# Patient Record
Sex: FEMALE | Race: WHITE | NOT HISPANIC OR LATINO | Employment: OTHER | URBAN - METROPOLITAN AREA
[De-identification: names, ages, dates, MRNs, and addresses within clinical notes are randomized per-mention and may not be internally consistent; named-entity substitution may affect disease eponyms.]

---

## 2017-01-10 ENCOUNTER — TRANSCRIBE ORDERS (OUTPATIENT)
Dept: LAB | Facility: HOSPITAL | Age: 82
End: 2017-01-10

## 2017-01-10 ENCOUNTER — APPOINTMENT (OUTPATIENT)
Dept: LAB | Facility: HOSPITAL | Age: 82
End: 2017-01-10
Attending: INTERNAL MEDICINE
Payer: MEDICARE

## 2017-01-10 ENCOUNTER — GENERIC CONVERSION - ENCOUNTER (OUTPATIENT)
Dept: OTHER | Facility: OTHER | Age: 82
End: 2017-01-10

## 2017-01-10 DIAGNOSIS — I48.91 ATRIAL FIBRILLATION (HCC): ICD-10-CM

## 2017-01-10 LAB
INR PPP: 3.17 (ref 0.86–1.16)
PROTHROMBIN TIME: 31.9 SECONDS (ref 12–14.3)

## 2017-01-10 PROCEDURE — 85610 PROTHROMBIN TIME: CPT

## 2017-01-10 PROCEDURE — 36415 COLL VENOUS BLD VENIPUNCTURE: CPT

## 2017-01-20 ENCOUNTER — APPOINTMENT (OUTPATIENT)
Dept: AUDIOLOGY | Age: 82
End: 2017-01-20
Payer: MEDICARE

## 2017-01-20 PROCEDURE — V5014 HEARING AID REPAIR/MODIFYING: HCPCS | Performed by: AUDIOLOGIST

## 2017-01-25 ENCOUNTER — APPOINTMENT (OUTPATIENT)
Dept: LAB | Facility: HOSPITAL | Age: 82
End: 2017-01-25
Attending: INTERNAL MEDICINE
Payer: MEDICARE

## 2017-01-25 ENCOUNTER — TRANSCRIBE ORDERS (OUTPATIENT)
Dept: LAB | Facility: HOSPITAL | Age: 82
End: 2017-01-25

## 2017-01-25 ENCOUNTER — APPOINTMENT (OUTPATIENT)
Dept: AUDIOLOGY | Age: 82
End: 2017-01-25
Payer: MEDICARE

## 2017-01-25 DIAGNOSIS — Z79.01 LONG TERM (CURRENT) USE OF ANTICOAGULANTS: Primary | ICD-10-CM

## 2017-01-25 DIAGNOSIS — I48.91 ATRIAL FIBRILLATION (HCC): ICD-10-CM

## 2017-01-25 LAB
INR PPP: 1.34 (ref 0.86–1.16)
PROTHROMBIN TIME: 16.6 SECONDS (ref 12–14.3)

## 2017-01-25 PROCEDURE — 85610 PROTHROMBIN TIME: CPT

## 2017-01-25 PROCEDURE — 36415 COLL VENOUS BLD VENIPUNCTURE: CPT

## 2017-01-31 ENCOUNTER — GENERIC CONVERSION - ENCOUNTER (OUTPATIENT)
Dept: CARDIOLOGY CLINIC | Facility: CLINIC | Age: 82
End: 2017-01-31

## 2017-02-02 ENCOUNTER — ALLSCRIPTS OFFICE VISIT (OUTPATIENT)
Dept: OTHER | Facility: OTHER | Age: 82
End: 2017-02-02

## 2017-02-07 ENCOUNTER — APPOINTMENT (OUTPATIENT)
Dept: LAB | Facility: HOSPITAL | Age: 82
End: 2017-02-07
Attending: INTERNAL MEDICINE
Payer: MEDICARE

## 2017-02-07 ENCOUNTER — GENERIC CONVERSION - ENCOUNTER (OUTPATIENT)
Dept: OTHER | Facility: OTHER | Age: 82
End: 2017-02-07

## 2017-02-07 DIAGNOSIS — I48.91 ATRIAL FIBRILLATION (HCC): ICD-10-CM

## 2017-02-07 LAB
INR PPP: 2.12 (ref 0.86–1.16)
PROTHROMBIN TIME: 23.5 SECONDS (ref 12–14.3)

## 2017-02-07 PROCEDURE — 36415 COLL VENOUS BLD VENIPUNCTURE: CPT

## 2017-02-07 PROCEDURE — 85610 PROTHROMBIN TIME: CPT

## 2017-02-08 DIAGNOSIS — I48.91 ATRIAL FIBRILLATION (HCC): ICD-10-CM

## 2017-02-08 DIAGNOSIS — H91.90 HEARING LOSS: ICD-10-CM

## 2017-02-20 ENCOUNTER — APPOINTMENT (OUTPATIENT)
Dept: AUDIOLOGY | Age: 82
End: 2017-02-20
Payer: MEDICARE

## 2017-02-20 PROCEDURE — 92557 COMPREHENSIVE HEARING TEST: CPT | Performed by: AUDIOLOGIST

## 2017-02-20 PROCEDURE — 92567 TYMPANOMETRY: CPT | Performed by: AUDIOLOGIST

## 2017-02-23 ENCOUNTER — GENERIC CONVERSION - ENCOUNTER (OUTPATIENT)
Dept: OTHER | Facility: OTHER | Age: 82
End: 2017-02-23

## 2017-02-24 ENCOUNTER — TRANSCRIBE ORDERS (OUTPATIENT)
Dept: LAB | Facility: HOSPITAL | Age: 82
End: 2017-02-24

## 2017-02-24 ENCOUNTER — GENERIC CONVERSION - ENCOUNTER (OUTPATIENT)
Dept: OTHER | Facility: OTHER | Age: 82
End: 2017-02-24

## 2017-02-24 ENCOUNTER — APPOINTMENT (OUTPATIENT)
Dept: LAB | Facility: HOSPITAL | Age: 82
End: 2017-02-24
Attending: INTERNAL MEDICINE
Payer: MEDICARE

## 2017-02-24 DIAGNOSIS — I48.91 ATRIAL FIBRILLATION (HCC): ICD-10-CM

## 2017-02-24 LAB
INR PPP: 2.14 (ref 0.86–1.16)
PROTHROMBIN TIME: 23.7 SECONDS (ref 12–14.3)

## 2017-02-24 PROCEDURE — 36415 COLL VENOUS BLD VENIPUNCTURE: CPT

## 2017-02-24 PROCEDURE — 85610 PROTHROMBIN TIME: CPT

## 2017-03-09 ENCOUNTER — GENERIC CONVERSION - ENCOUNTER (OUTPATIENT)
Dept: OTHER | Facility: OTHER | Age: 82
End: 2017-03-09

## 2017-03-15 DIAGNOSIS — Z12.31 ENCOUNTER FOR SCREENING MAMMOGRAM FOR MALIGNANT NEOPLASM OF BREAST: ICD-10-CM

## 2017-03-15 DIAGNOSIS — I48.91 ATRIAL FIBRILLATION (HCC): ICD-10-CM

## 2017-03-24 ENCOUNTER — GENERIC CONVERSION - ENCOUNTER (OUTPATIENT)
Dept: OTHER | Facility: OTHER | Age: 82
End: 2017-03-24

## 2017-03-24 ENCOUNTER — APPOINTMENT (OUTPATIENT)
Dept: LAB | Facility: HOSPITAL | Age: 82
End: 2017-03-24
Attending: INTERNAL MEDICINE
Payer: MEDICARE

## 2017-03-24 ENCOUNTER — TRANSCRIBE ORDERS (OUTPATIENT)
Dept: LAB | Facility: HOSPITAL | Age: 82
End: 2017-03-24

## 2017-03-24 DIAGNOSIS — I48.91 ATRIAL FIBRILLATION (HCC): ICD-10-CM

## 2017-03-24 LAB
INR PPP: 3.15 (ref 0.86–1.16)
PROTHROMBIN TIME: 31.7 SECONDS (ref 12–14.3)

## 2017-03-24 PROCEDURE — 85610 PROTHROMBIN TIME: CPT

## 2017-03-24 PROCEDURE — 36415 COLL VENOUS BLD VENIPUNCTURE: CPT

## 2017-04-07 ENCOUNTER — GENERIC CONVERSION - ENCOUNTER (OUTPATIENT)
Dept: OTHER | Facility: OTHER | Age: 82
End: 2017-04-07

## 2017-04-07 ENCOUNTER — TRANSCRIBE ORDERS (OUTPATIENT)
Dept: LAB | Facility: HOSPITAL | Age: 82
End: 2017-04-07

## 2017-04-07 ENCOUNTER — APPOINTMENT (OUTPATIENT)
Dept: LAB | Facility: HOSPITAL | Age: 82
End: 2017-04-07
Attending: INTERNAL MEDICINE
Payer: MEDICARE

## 2017-04-07 DIAGNOSIS — I48.91 ATRIAL FIBRILLATION (HCC): ICD-10-CM

## 2017-04-07 LAB
INR PPP: 3.33 (ref 0.86–1.16)
PROTHROMBIN TIME: 33.1 SECONDS (ref 12–14.3)

## 2017-04-07 PROCEDURE — 36415 COLL VENOUS BLD VENIPUNCTURE: CPT

## 2017-04-07 PROCEDURE — 85610 PROTHROMBIN TIME: CPT

## 2017-04-14 ENCOUNTER — GENERIC CONVERSION - ENCOUNTER (OUTPATIENT)
Dept: OTHER | Facility: OTHER | Age: 82
End: 2017-04-14

## 2017-04-19 ENCOUNTER — GENERIC CONVERSION - ENCOUNTER (OUTPATIENT)
Dept: OTHER | Facility: OTHER | Age: 82
End: 2017-04-19

## 2017-04-19 ENCOUNTER — LAB (OUTPATIENT)
Dept: LAB | Facility: HOSPITAL | Age: 82
End: 2017-04-19
Payer: MEDICARE

## 2017-04-19 ENCOUNTER — TRANSCRIBE ORDERS (OUTPATIENT)
Dept: LAB | Facility: HOSPITAL | Age: 82
End: 2017-04-19

## 2017-04-19 DIAGNOSIS — I48.91 ATRIAL FIBRILLATION (HCC): ICD-10-CM

## 2017-04-19 DIAGNOSIS — I10 ESSENTIAL (PRIMARY) HYPERTENSION: ICD-10-CM

## 2017-04-19 DIAGNOSIS — M81.0 SENILE OSTEOPOROSIS: ICD-10-CM

## 2017-04-19 DIAGNOSIS — M81.0 SENILE OSTEOPOROSIS: Primary | ICD-10-CM

## 2017-04-19 LAB
25(OH)D3 SERPL-MCNC: 10.8 NG/ML (ref 30–100)
ALBUMIN SERPL BCP-MCNC: 3.1 G/DL (ref 3.5–5)
ALP SERPL-CCNC: 100 U/L (ref 46–116)
ALT SERPL W P-5'-P-CCNC: 15 U/L (ref 12–78)
ANION GAP SERPL CALCULATED.3IONS-SCNC: 8 MMOL/L (ref 4–13)
AST SERPL W P-5'-P-CCNC: 10 U/L (ref 5–45)
BILIRUB SERPL-MCNC: 1.15 MG/DL (ref 0.2–1)
BUN SERPL-MCNC: 18 MG/DL (ref 5–25)
CALCIUM SERPL-MCNC: 9.3 MG/DL (ref 8.3–10.1)
CHLORIDE SERPL-SCNC: 102 MMOL/L (ref 100–108)
CO2 SERPL-SCNC: 29 MMOL/L (ref 21–32)
CREAT SERPL-MCNC: 0.58 MG/DL (ref 0.6–1.3)
GFR SERPL CREATININE-BSD FRML MDRD: >60 ML/MIN/1.73SQ M
GLUCOSE SERPL-MCNC: 92 MG/DL (ref 65–140)
INR PPP: 1.89 (ref 0.86–1.16)
POTASSIUM SERPL-SCNC: 3.9 MMOL/L (ref 3.5–5.3)
PROT SERPL-MCNC: 6.9 G/DL (ref 6.4–8.2)
PROTHROMBIN TIME: 21.5 SECONDS (ref 12–14.3)
PTH-INTACT SERPL-MCNC: 75.5 PG/ML (ref 14–72)
SODIUM SERPL-SCNC: 139 MMOL/L (ref 136–145)
TSH SERPL DL<=0.05 MIU/L-ACNC: 1.6 UIU/ML (ref 0.36–3.74)

## 2017-04-19 PROCEDURE — 80053 COMPREHEN METABOLIC PANEL: CPT

## 2017-04-19 PROCEDURE — 36415 COLL VENOUS BLD VENIPUNCTURE: CPT

## 2017-04-19 PROCEDURE — 85610 PROTHROMBIN TIME: CPT

## 2017-04-19 PROCEDURE — 84080 ASSAY ALKALINE PHOSPHATASES: CPT

## 2017-04-19 PROCEDURE — 82784 ASSAY IGA/IGD/IGG/IGM EACH: CPT

## 2017-04-19 PROCEDURE — 83516 IMMUNOASSAY NONANTIBODY: CPT

## 2017-04-19 PROCEDURE — 84165 PROTEIN E-PHORESIS SERUM: CPT

## 2017-04-19 PROCEDURE — 83970 ASSAY OF PARATHORMONE: CPT

## 2017-04-19 PROCEDURE — 84443 ASSAY THYROID STIM HORMONE: CPT

## 2017-04-19 PROCEDURE — 82306 VITAMIN D 25 HYDROXY: CPT

## 2017-04-19 PROCEDURE — 82523 COLLAGEN CROSSLINKS: CPT

## 2017-04-19 PROCEDURE — 86334 IMMUNOFIX E-PHORESIS SERUM: CPT

## 2017-04-21 ENCOUNTER — APPOINTMENT (OUTPATIENT)
Dept: LAB | Facility: HOSPITAL | Age: 82
End: 2017-04-21
Payer: MEDICARE

## 2017-04-21 LAB
ALBUMIN SERPL ELPH-MCNC: 3.61 G/DL (ref 3.5–5)
ALBUMIN SERPL ELPH-MCNC: 55.5 % (ref 52–65)
ALPHA1 GLOB SERPL ELPH-MCNC: 0.34 G/DL (ref 0.1–0.4)
ALPHA1 GLOB SERPL ELPH-MCNC: 5.3 % (ref 2.5–5)
ALPHA2 GLOB SERPL ELPH-MCNC: 0.74 G/DL (ref 0.4–1.2)
ALPHA2 GLOB SERPL ELPH-MCNC: 11.4 % (ref 7–13)
BETA GLOB ABNORMAL SERPL ELPH-MCNC: 0.47 G/DL (ref 0.4–0.8)
BETA1 GLOB SERPL ELPH-MCNC: 7.2 % (ref 5–13)
BETA2 GLOB SERPL ELPH-MCNC: 5.5 % (ref 2–8)
BETA2+GAMMA GLOB SERPL ELPH-MCNC: 0.36 G/DL (ref 0.2–0.5)
CALCIUM 24H UR-MCNC: <82.5 MG/24 HRS (ref 42–353)
CREAT 24H UR-MRATE: 0.8 G/24HR (ref 0.6–1.8)
GAMMA GLOB ABNORMAL SERPL ELPH-MCNC: 0.98 G/DL (ref 0.5–1.6)
GAMMA GLOB SERPL ELPH-MCNC: 15.1 % (ref 12–22)
IGG/ALB SER: 1.25 {RATIO} (ref 1.1–1.8)
INTERPRETATION UR IFE-IMP: NORMAL
M PROTEIN 1 MFR SERPL ELPH: 5.2 %
M PROTEIN 1 SERPL ELPH-MCNC: 0.34 G/DL
PROT SERPL-MCNC: 6.5 G/DL (ref 6.4–8.2)
SPECIMEN VOL UR: 1650 ML
SPECIMEN VOL UR: 1650 ML

## 2017-04-21 PROCEDURE — 82340 ASSAY OF CALCIUM IN URINE: CPT | Performed by: DENTIST

## 2017-04-21 PROCEDURE — 82570 ASSAY OF URINE CREATININE: CPT | Performed by: DENTIST

## 2017-04-23 LAB — COLLAGEN CTX SERPL-MCNC: 1207 PG/ML

## 2017-04-24 LAB — ALP BONE SERPL-MCNC: 21.2 UG/L

## 2017-04-25 LAB — MISCELLANEOUS LAB TEST RESULT: NORMAL

## 2017-04-28 ENCOUNTER — GENERIC CONVERSION - ENCOUNTER (OUTPATIENT)
Dept: OTHER | Facility: OTHER | Age: 82
End: 2017-04-28

## 2017-05-08 ENCOUNTER — ALLSCRIPTS OFFICE VISIT (OUTPATIENT)
Dept: OTHER | Facility: OTHER | Age: 82
End: 2017-05-08

## 2017-05-09 ENCOUNTER — TRANSCRIBE ORDERS (OUTPATIENT)
Dept: LAB | Facility: HOSPITAL | Age: 82
End: 2017-05-09

## 2017-05-10 ENCOUNTER — APPOINTMENT (OUTPATIENT)
Dept: LAB | Facility: HOSPITAL | Age: 82
End: 2017-05-10
Attending: INTERNAL MEDICINE
Payer: MEDICARE

## 2017-05-10 ENCOUNTER — GENERIC CONVERSION - ENCOUNTER (OUTPATIENT)
Dept: OTHER | Facility: OTHER | Age: 82
End: 2017-05-10

## 2017-05-10 DIAGNOSIS — Z79.01 LONG TERM (CURRENT) USE OF ANTICOAGULANTS: ICD-10-CM

## 2017-05-10 DIAGNOSIS — I48.91 ATRIAL FIBRILLATION, UNSPECIFIED TYPE (HCC): ICD-10-CM

## 2017-05-10 DIAGNOSIS — I48.91 ATRIAL FIBRILLATION (HCC): ICD-10-CM

## 2017-05-10 LAB
INR PPP: 2.64 (ref 0.86–1.16)
PROTHROMBIN TIME: 28.5 SECONDS (ref 12.1–14.4)

## 2017-05-10 PROCEDURE — 36415 COLL VENOUS BLD VENIPUNCTURE: CPT

## 2017-05-10 PROCEDURE — 85610 PROTHROMBIN TIME: CPT

## 2017-05-19 ENCOUNTER — APPOINTMENT (OUTPATIENT)
Dept: LAB | Facility: HOSPITAL | Age: 82
End: 2017-05-19
Attending: INTERNAL MEDICINE
Payer: MEDICARE

## 2017-05-19 ENCOUNTER — ALLSCRIPTS OFFICE VISIT (OUTPATIENT)
Dept: OTHER | Facility: OTHER | Age: 82
End: 2017-05-19

## 2017-05-19 ENCOUNTER — GENERIC CONVERSION - ENCOUNTER (OUTPATIENT)
Dept: OTHER | Facility: OTHER | Age: 82
End: 2017-05-19

## 2017-05-19 ENCOUNTER — TRANSCRIBE ORDERS (OUTPATIENT)
Dept: LAB | Facility: HOSPITAL | Age: 82
End: 2017-05-19

## 2017-05-19 DIAGNOSIS — I48.91 ATRIAL FIBRILLATION (HCC): ICD-10-CM

## 2017-05-19 LAB
INR PPP: 1.7 (ref 0.86–1.16)
PROTHROMBIN TIME: 20.1 SECONDS (ref 12.1–14.4)

## 2017-05-19 PROCEDURE — 36415 COLL VENOUS BLD VENIPUNCTURE: CPT

## 2017-05-19 PROCEDURE — 85610 PROTHROMBIN TIME: CPT

## 2017-05-26 DIAGNOSIS — I48.91 ATRIAL FIBRILLATION (HCC): ICD-10-CM

## 2017-06-04 ENCOUNTER — GENERIC CONVERSION - ENCOUNTER (OUTPATIENT)
Dept: OTHER | Facility: OTHER | Age: 82
End: 2017-06-04

## 2017-06-05 ENCOUNTER — APPOINTMENT (OUTPATIENT)
Dept: LAB | Facility: HOSPITAL | Age: 82
End: 2017-06-05
Attending: INTERNAL MEDICINE
Payer: MEDICARE

## 2017-06-05 ENCOUNTER — TRANSCRIBE ORDERS (OUTPATIENT)
Dept: LAB | Facility: HOSPITAL | Age: 82
End: 2017-06-05

## 2017-06-05 DIAGNOSIS — I48.91 ATRIAL FIBRILLATION (HCC): ICD-10-CM

## 2017-06-05 LAB
INR PPP: 2.53 (ref 0.86–1.16)
PROTHROMBIN TIME: 27.6 SECONDS (ref 12.1–14.4)

## 2017-06-05 PROCEDURE — 36415 COLL VENOUS BLD VENIPUNCTURE: CPT

## 2017-06-05 PROCEDURE — 85610 PROTHROMBIN TIME: CPT

## 2017-06-11 ENCOUNTER — GENERIC CONVERSION - ENCOUNTER (OUTPATIENT)
Dept: OTHER | Facility: OTHER | Age: 82
End: 2017-06-11

## 2017-06-16 ENCOUNTER — GENERIC CONVERSION - ENCOUNTER (OUTPATIENT)
Dept: OTHER | Facility: OTHER | Age: 82
End: 2017-06-16

## 2017-06-21 ENCOUNTER — APPOINTMENT (OUTPATIENT)
Dept: LAB | Facility: HOSPITAL | Age: 82
End: 2017-06-21
Attending: INTERNAL MEDICINE
Payer: MEDICARE

## 2017-06-21 ENCOUNTER — TRANSCRIBE ORDERS (OUTPATIENT)
Dept: LAB | Facility: HOSPITAL | Age: 82
End: 2017-06-21

## 2017-06-21 ENCOUNTER — GENERIC CONVERSION - ENCOUNTER (OUTPATIENT)
Dept: OTHER | Facility: OTHER | Age: 82
End: 2017-06-21

## 2017-06-21 DIAGNOSIS — I48.91 ATRIAL FIBRILLATION (HCC): ICD-10-CM

## 2017-06-21 LAB
INR PPP: 3.09 (ref 0.86–1.16)
PROTHROMBIN TIME: 32.3 SECONDS (ref 12.1–14.4)

## 2017-06-21 PROCEDURE — 85610 PROTHROMBIN TIME: CPT

## 2017-06-22 ENCOUNTER — GENERIC CONVERSION - ENCOUNTER (OUTPATIENT)
Dept: OTHER | Facility: OTHER | Age: 82
End: 2017-06-22

## 2017-06-22 ENCOUNTER — HOSPITAL ENCOUNTER (EMERGENCY)
Facility: HOSPITAL | Age: 82
Discharge: HOME | End: 2017-06-22
Attending: EMERGENCY MEDICINE | Admitting: EMERGENCY MEDICINE
Payer: MEDICARE

## 2017-06-22 ENCOUNTER — APPOINTMENT (EMERGENCY)
Dept: NON INVASIVE DIAGNOSTICS | Facility: HOSPITAL | Age: 82
End: 2017-06-22
Payer: MEDICARE

## 2017-06-22 VITALS
WEIGHT: 185 LBS | SYSTOLIC BLOOD PRESSURE: 182 MMHG | TEMPERATURE: 98.2 F | HEART RATE: 80 BPM | OXYGEN SATURATION: 96 % | RESPIRATION RATE: 18 BRPM | DIASTOLIC BLOOD PRESSURE: 88 MMHG

## 2017-06-22 DIAGNOSIS — R60.0 EDEMA OF BOTH LEGS: ICD-10-CM

## 2017-06-22 DIAGNOSIS — R60.0 BILATERAL LOWER EXTREMITY EDEMA: Primary | ICD-10-CM

## 2017-06-22 LAB
ANION GAP SERPL CALCULATED.3IONS-SCNC: 6 MMOL/L (ref 4–13)
BASOPHILS # BLD AUTO: 0.04 THOUSANDS/ΜL (ref 0–0.1)
BASOPHILS NFR BLD AUTO: 0 % (ref 0–1)
BUN SERPL-MCNC: 16 MG/DL (ref 5–25)
CALCIUM SERPL-MCNC: 8.2 MG/DL (ref 8.3–10.1)
CHLORIDE SERPL-SCNC: 104 MMOL/L (ref 100–108)
CO2 SERPL-SCNC: 28 MMOL/L (ref 21–32)
CREAT SERPL-MCNC: 0.88 MG/DL (ref 0.6–1.3)
EOSINOPHIL # BLD AUTO: 0.14 THOUSAND/ΜL (ref 0–0.61)
EOSINOPHIL NFR BLD AUTO: 2 % (ref 0–6)
ERYTHROCYTE [DISTWIDTH] IN BLOOD BY AUTOMATED COUNT: 13.3 % (ref 11.6–15.1)
GFR SERPL CREATININE-BSD FRML MDRD: >60 ML/MIN/1.73SQ M
GLUCOSE SERPL-MCNC: 101 MG/DL (ref 65–140)
HCT VFR BLD AUTO: 37.8 % (ref 34.8–46.1)
HGB BLD-MCNC: 12.5 G/DL (ref 11.5–15.4)
LYMPHOCYTES # BLD AUTO: 1.64 THOUSANDS/ΜL (ref 0.6–4.47)
LYMPHOCYTES NFR BLD AUTO: 18 % (ref 14–44)
MCH RBC QN AUTO: 32 PG (ref 26.8–34.3)
MCHC RBC AUTO-ENTMCNC: 33.1 G/DL (ref 31.4–37.4)
MCV RBC AUTO: 97 FL (ref 82–98)
MONOCYTES # BLD AUTO: 1.12 THOUSAND/ΜL (ref 0.17–1.22)
MONOCYTES NFR BLD AUTO: 12 % (ref 4–12)
NEUTROPHILS # BLD AUTO: 6.2 THOUSANDS/ΜL (ref 1.85–7.62)
NEUTS SEG NFR BLD AUTO: 68 % (ref 43–75)
NRBC BLD AUTO-RTO: 0 /100 WBCS
NT-PROBNP SERPL-MCNC: 957 PG/ML
PLATELET # BLD AUTO: 332 THOUSANDS/UL (ref 149–390)
PMV BLD AUTO: 9.6 FL (ref 8.9–12.7)
POTASSIUM SERPL-SCNC: 3.7 MMOL/L (ref 3.5–5.3)
RBC # BLD AUTO: 3.91 MILLION/UL (ref 3.81–5.12)
SODIUM SERPL-SCNC: 138 MMOL/L (ref 136–145)
WBC # BLD AUTO: 9.15 THOUSAND/UL (ref 4.31–10.16)

## 2017-06-22 PROCEDURE — 80048 BASIC METABOLIC PNL TOTAL CA: CPT | Performed by: EMERGENCY MEDICINE

## 2017-06-22 PROCEDURE — 99284 EMERGENCY DEPT VISIT MOD MDM: CPT

## 2017-06-22 PROCEDURE — 85025 COMPLETE CBC W/AUTO DIFF WBC: CPT | Performed by: EMERGENCY MEDICINE

## 2017-06-22 PROCEDURE — 93970 EXTREMITY STUDY: CPT

## 2017-06-22 PROCEDURE — 36415 COLL VENOUS BLD VENIPUNCTURE: CPT | Performed by: EMERGENCY MEDICINE

## 2017-06-22 PROCEDURE — 83880 ASSAY OF NATRIURETIC PEPTIDE: CPT | Performed by: EMERGENCY MEDICINE

## 2017-06-22 RX ORDER — ACETAMINOPHEN 325 MG/1
325 TABLET ORAL EVERY 6 HOURS PRN
COMMUNITY

## 2017-06-22 RX ORDER — WARFARIN SODIUM 2.5 MG/1
5 TABLET ORAL
COMMUNITY
End: 2019-09-10 | Stop reason: SDUPTHER

## 2017-06-22 RX ORDER — MELATONIN
50000 DAILY
COMMUNITY
End: 2018-08-29 | Stop reason: SDUPTHER

## 2017-06-22 RX ORDER — CLINDAMYCIN HYDROCHLORIDE 300 MG/1
300 CAPSULE ORAL 4 TIMES DAILY
COMMUNITY
End: 2018-05-21 | Stop reason: ALTCHOICE

## 2017-06-22 RX ORDER — FUROSEMIDE 40 MG/1
40 TABLET ORAL 2 TIMES DAILY
COMMUNITY
End: 2018-08-29 | Stop reason: SDUPTHER

## 2017-06-22 RX ORDER — MULTIVITAMIN
1 TABLET ORAL DAILY
COMMUNITY
End: 2018-08-29 | Stop reason: SDUPTHER

## 2017-06-22 RX ORDER — LOSARTAN POTASSIUM 50 MG/1
50 TABLET ORAL DAILY
COMMUNITY
End: 2019-03-07 | Stop reason: SDUPTHER

## 2017-06-22 RX ORDER — CLONIDINE HYDROCHLORIDE 0.1 MG/1
0.1 TABLET ORAL 2 TIMES DAILY
COMMUNITY
End: 2018-02-26 | Stop reason: SDUPTHER

## 2017-06-22 RX ORDER — ALBUTEROL SULFATE 90 UG/1
2 AEROSOL, METERED RESPIRATORY (INHALATION) EVERY 4 HOURS PRN
COMMUNITY
End: 2018-08-29 | Stop reason: SDUPTHER

## 2017-06-22 RX ORDER — WARFARIN SODIUM 2.5 MG/1
2.5 TABLET ORAL
COMMUNITY
End: 2018-08-21 | Stop reason: SDUPTHER

## 2017-06-22 RX ORDER — FLUTICASONE PROPIONATE 50 MCG
2 SPRAY, SUSPENSION (ML) NASAL DAILY
COMMUNITY
End: 2018-08-29 | Stop reason: SDUPTHER

## 2017-06-22 RX ORDER — IBUPROFEN 200 MG
1 CAPSULE ORAL DAILY
COMMUNITY
End: 2018-08-29 | Stop reason: SDUPTHER

## 2017-06-22 RX ORDER — TIMOLOL MALEATE 5 MG/ML
1 SOLUTION OPHTHALMIC DAILY
COMMUNITY
End: 2018-08-29 | Stop reason: SDUPTHER

## 2017-06-22 RX ORDER — ESOMEPRAZOLE MAGNESIUM 40 MG/1
40 FOR SUSPENSION ORAL
COMMUNITY
End: 2018-08-29 | Stop reason: SDUPTHER

## 2017-06-22 RX ORDER — DILTIAZEM HYDROCHLORIDE 240 MG/1
240 CAPSULE, EXTENDED RELEASE ORAL DAILY
COMMUNITY
End: 2018-08-29 | Stop reason: SDUPTHER

## 2017-06-26 ENCOUNTER — ALLSCRIPTS OFFICE VISIT (OUTPATIENT)
Dept: OTHER | Facility: OTHER | Age: 82
End: 2017-06-26

## 2017-06-26 DIAGNOSIS — I48.91 ATRIAL FIBRILLATION (HCC): ICD-10-CM

## 2017-06-26 DIAGNOSIS — I10 ESSENTIAL (PRIMARY) HYPERTENSION: ICD-10-CM

## 2017-06-30 DIAGNOSIS — I48.91 ATRIAL FIBRILLATION (HCC): ICD-10-CM

## 2017-06-30 DIAGNOSIS — R39.9 UNSPECIFIED SYMPTOMS AND SIGNS INVOLVING THE GENITOURINARY SYSTEM: ICD-10-CM

## 2017-06-30 DIAGNOSIS — R35.0 FREQUENCY OF MICTURITION: ICD-10-CM

## 2017-07-10 ENCOUNTER — APPOINTMENT (OUTPATIENT)
Dept: LAB | Facility: HOSPITAL | Age: 82
End: 2017-07-10
Attending: INTERNAL MEDICINE
Payer: MEDICARE

## 2017-07-10 ENCOUNTER — HOSPITAL ENCOUNTER (OUTPATIENT)
Dept: RADIOLOGY | Facility: HOSPITAL | Age: 82
Discharge: HOME | End: 2017-07-10
Payer: MEDICARE

## 2017-07-10 DIAGNOSIS — Z12.31 ENCOUNTER FOR SCREENING MAMMOGRAM FOR MALIGNANT NEOPLASM OF BREAST: ICD-10-CM

## 2017-07-10 DIAGNOSIS — I48.91 ATRIAL FIBRILLATION (HCC): ICD-10-CM

## 2017-07-10 LAB
INR PPP: 2.81 (ref 0.86–1.16)
PROTHROMBIN TIME: 30 SECONDS (ref 12.1–14.4)

## 2017-07-10 PROCEDURE — 85610 PROTHROMBIN TIME: CPT

## 2017-07-10 PROCEDURE — G0202 SCR MAMMO BI INCL CAD: HCPCS

## 2017-07-10 PROCEDURE — 36415 COLL VENOUS BLD VENIPUNCTURE: CPT

## 2017-07-11 ENCOUNTER — TRANSCRIBE ORDERS (OUTPATIENT)
Dept: ADMINISTRATIVE | Facility: HOSPITAL | Age: 82
End: 2017-07-11

## 2017-07-11 DIAGNOSIS — Z12.31 ENCOUNTER FOR MAMMOGRAM TO ESTABLISH BASELINE MAMMOGRAM: Primary | ICD-10-CM

## 2017-07-14 ENCOUNTER — HOSPITAL ENCOUNTER (OUTPATIENT)
Dept: NON INVASIVE DIAGNOSTICS | Facility: CLINIC | Age: 82
Discharge: HOME | End: 2017-07-14
Payer: MEDICARE

## 2017-07-14 DIAGNOSIS — I48.91 ATRIAL FIBRILLATION (HCC): ICD-10-CM

## 2017-07-14 DIAGNOSIS — I10 ESSENTIAL (PRIMARY) HYPERTENSION: ICD-10-CM

## 2017-07-14 PROCEDURE — 93923 UPR/LXTR ART STDY 3+ LVLS: CPT

## 2017-07-15 ENCOUNTER — GENERIC CONVERSION - ENCOUNTER (OUTPATIENT)
Dept: OTHER | Facility: OTHER | Age: 82
End: 2017-07-15

## 2017-07-17 ENCOUNTER — ALLSCRIPTS OFFICE VISIT (OUTPATIENT)
Dept: OTHER | Facility: OTHER | Age: 82
End: 2017-07-17

## 2017-07-17 ENCOUNTER — APPOINTMENT (OUTPATIENT)
Dept: LAB | Facility: HOSPITAL | Age: 82
End: 2017-07-17
Payer: MEDICARE

## 2017-07-17 DIAGNOSIS — R39.9 UNSPECIFIED SYMPTOMS AND SIGNS INVOLVING THE GENITOURINARY SYSTEM: ICD-10-CM

## 2017-07-17 DIAGNOSIS — R35.0 FREQUENCY OF MICTURITION: ICD-10-CM

## 2017-07-17 LAB
BACTERIA UR QL AUTO: ABNORMAL /HPF
BILIRUB UR QL STRIP: NEGATIVE
BILIRUB UR QL STRIP: NORMAL
CLARITY UR: CLEAR
CLARITY UR: NORMAL
COLOR UR: YELLOW
COLOR UR: YELLOW
GLUCOSE (HISTORICAL): NORMAL
GLUCOSE UR STRIP-MCNC: NEGATIVE MG/DL
HGB UR QL STRIP.AUTO: 250
HGB UR QL STRIP.AUTO: ABNORMAL
HYALINE CASTS #/AREA URNS LPF: ABNORMAL /LPF
KETONES UR STRIP-MCNC: NEGATIVE MG/DL
KETONES UR STRIP-MCNC: NEGATIVE MG/DL
LEUKOCYTE ESTERASE UR QL STRIP: NEGATIVE
LEUKOCYTE ESTERASE UR QL STRIP: NORMAL
NITRITE UR QL STRIP: NEGATIVE
NITRITE UR QL STRIP: NEGATIVE
NON-SQ EPI CELLS URNS QL MICRO: ABNORMAL /HPF
PH UR STRIP.AUTO: 7.5 [PH] (ref 4.5–8)
PH UR STRIP.AUTO: 8 [PH]
PROT UR STRIP-MCNC: ABNORMAL MG/DL
PROT UR STRIP-MCNC: NORMAL MG/DL
RBC #/AREA URNS AUTO: ABNORMAL /HPF
SP GR UR STRIP.AUTO: 1.02 (ref 1–1.03)
SP GR UR STRIP.AUTO: 1010
UROBILINOGEN UR QL STRIP.AUTO: 1 E.U./DL
UROBILINOGEN UR QL STRIP.AUTO: NORMAL
WBC #/AREA URNS AUTO: ABNORMAL /HPF

## 2017-07-17 PROCEDURE — 81001 URINALYSIS AUTO W/SCOPE: CPT

## 2017-07-22 DIAGNOSIS — I48.91 ATRIAL FIBRILLATION (HCC): ICD-10-CM

## 2017-07-22 DIAGNOSIS — Z12.31 ENCOUNTER FOR SCREENING MAMMOGRAM FOR MALIGNANT NEOPLASM OF BREAST: ICD-10-CM

## 2017-08-17 ENCOUNTER — ALLSCRIPTS OFFICE VISIT (OUTPATIENT)
Dept: OTHER | Facility: OTHER | Age: 82
End: 2017-08-17

## 2017-08-18 ENCOUNTER — TRANSCRIBE ORDERS (OUTPATIENT)
Dept: LAB | Facility: HOSPITAL | Age: 82
End: 2017-08-18

## 2017-08-18 ENCOUNTER — GENERIC CONVERSION - ENCOUNTER (OUTPATIENT)
Dept: OTHER | Facility: OTHER | Age: 82
End: 2017-08-18

## 2017-08-18 ENCOUNTER — APPOINTMENT (OUTPATIENT)
Dept: LAB | Facility: HOSPITAL | Age: 82
End: 2017-08-18
Attending: INTERNAL MEDICINE
Payer: MEDICARE

## 2017-08-18 DIAGNOSIS — I48.91 ATRIAL FIBRILLATION (HCC): ICD-10-CM

## 2017-08-18 DIAGNOSIS — E55.9 UNSPECIFIED VITAMIN D DEFICIENCY: Primary | ICD-10-CM

## 2017-08-18 DIAGNOSIS — E55.9 UNSPECIFIED VITAMIN D DEFICIENCY: ICD-10-CM

## 2017-08-18 LAB
25(OH)D3 SERPL-MCNC: 32 NG/ML (ref 30–100)
INR PPP: 2.44 (ref 0.86–1.16)
PROTHROMBIN TIME: 26.8 SECONDS (ref 12.1–14.4)

## 2017-08-18 PROCEDURE — 36415 COLL VENOUS BLD VENIPUNCTURE: CPT

## 2017-08-18 PROCEDURE — 85610 PROTHROMBIN TIME: CPT

## 2017-08-18 PROCEDURE — 82306 VITAMIN D 25 HYDROXY: CPT

## 2017-08-25 DIAGNOSIS — I48.91 ATRIAL FIBRILLATION (HCC): ICD-10-CM

## 2017-09-14 ENCOUNTER — TRANSCRIBE ORDERS (OUTPATIENT)
Dept: LAB | Facility: HOSPITAL | Age: 82
End: 2017-09-14

## 2017-09-15 ENCOUNTER — APPOINTMENT (OUTPATIENT)
Dept: LAB | Facility: HOSPITAL | Age: 82
End: 2017-09-15
Attending: INTERNAL MEDICINE
Payer: MEDICARE

## 2017-09-15 ENCOUNTER — GENERIC CONVERSION - ENCOUNTER (OUTPATIENT)
Dept: OTHER | Facility: OTHER | Age: 82
End: 2017-09-15

## 2017-09-15 ENCOUNTER — TRANSCRIBE ORDERS (OUTPATIENT)
Dept: LAB | Facility: HOSPITAL | Age: 82
End: 2017-09-15

## 2017-09-15 DIAGNOSIS — I48.91 ATRIAL FIBRILLATION (HCC): ICD-10-CM

## 2017-09-15 LAB
INR PPP: 2.38 (ref 0.86–1.16)
PROTHROMBIN TIME: 26.3 SECONDS (ref 12.1–14.4)

## 2017-09-15 PROCEDURE — 85610 PROTHROMBIN TIME: CPT

## 2017-09-15 PROCEDURE — 36415 COLL VENOUS BLD VENIPUNCTURE: CPT

## 2017-09-29 DIAGNOSIS — I48.91 ATRIAL FIBRILLATION (HCC): ICD-10-CM

## 2017-10-12 ENCOUNTER — GENERIC CONVERSION - ENCOUNTER (OUTPATIENT)
Dept: OTHER | Facility: OTHER | Age: 82
End: 2017-10-12

## 2017-10-19 ENCOUNTER — APPOINTMENT (OUTPATIENT)
Dept: LAB | Facility: HOSPITAL | Age: 82
End: 2017-10-19
Attending: INTERNAL MEDICINE
Payer: MEDICARE

## 2017-10-19 ENCOUNTER — TRANSCRIBE ORDERS (OUTPATIENT)
Dept: LAB | Facility: HOSPITAL | Age: 82
End: 2017-10-19

## 2017-10-19 DIAGNOSIS — I48.91 ATRIAL FIBRILLATION (HCC): ICD-10-CM

## 2017-10-19 LAB
INR PPP: 2.86 (ref 0.86–1.16)
PROTHROMBIN TIME: 30.4 SECONDS (ref 12.1–14.4)

## 2017-10-19 PROCEDURE — 85610 PROTHROMBIN TIME: CPT

## 2017-10-19 PROCEDURE — 36415 COLL VENOUS BLD VENIPUNCTURE: CPT

## 2017-11-03 DIAGNOSIS — I10 ESSENTIAL (PRIMARY) HYPERTENSION: ICD-10-CM

## 2017-11-03 DIAGNOSIS — D72.829 ELEVATED WHITE BLOOD CELL COUNT: ICD-10-CM

## 2017-11-03 DIAGNOSIS — I48.91 ATRIAL FIBRILLATION (HCC): ICD-10-CM

## 2017-11-08 ENCOUNTER — APPOINTMENT (OUTPATIENT)
Dept: LAB | Facility: HOSPITAL | Age: 82
End: 2017-11-08
Attending: INTERNAL MEDICINE
Payer: MEDICARE

## 2017-11-08 DIAGNOSIS — I48.91 ATRIAL FIBRILLATION (HCC): ICD-10-CM

## 2017-11-08 DIAGNOSIS — I10 ESSENTIAL (PRIMARY) HYPERTENSION: ICD-10-CM

## 2017-11-08 LAB
ALBUMIN SERPL BCP-MCNC: 3.3 G/DL (ref 3.5–5)
ALP SERPL-CCNC: 87 U/L (ref 46–116)
ALT SERPL W P-5'-P-CCNC: 15 U/L (ref 12–78)
ANION GAP SERPL CALCULATED.3IONS-SCNC: 5 MMOL/L (ref 4–13)
AST SERPL W P-5'-P-CCNC: 10 U/L (ref 5–45)
BILIRUB SERPL-MCNC: 0.76 MG/DL (ref 0.2–1)
BUN SERPL-MCNC: 21 MG/DL (ref 5–25)
CALCIUM SERPL-MCNC: 8.2 MG/DL (ref 8.3–10.1)
CHLORIDE SERPL-SCNC: 104 MMOL/L (ref 100–108)
CHOLEST SERPL-MCNC: 141 MG/DL (ref 50–200)
CO2 SERPL-SCNC: 28 MMOL/L (ref 21–32)
CREAT SERPL-MCNC: 0.73 MG/DL (ref 0.6–1.3)
ERYTHROCYTE [DISTWIDTH] IN BLOOD BY AUTOMATED COUNT: 13.2 % (ref 11.6–15.1)
GFR SERPL CREATININE-BSD FRML MDRD: 74 ML/MIN/1.73SQ M
GLUCOSE P FAST SERPL-MCNC: 90 MG/DL (ref 65–99)
HCT VFR BLD AUTO: 40.8 % (ref 34.8–46.1)
HDLC SERPL-MCNC: 51 MG/DL (ref 40–60)
HGB BLD-MCNC: 13.7 G/DL (ref 11.5–15.4)
INR PPP: 2.41 (ref 0.86–1.16)
LDLC SERPL CALC-MCNC: 75 MG/DL (ref 0–100)
MCH RBC QN AUTO: 32.2 PG (ref 26.8–34.3)
MCHC RBC AUTO-ENTMCNC: 33.6 G/DL (ref 31.4–37.4)
MCV RBC AUTO: 96 FL (ref 82–98)
PLATELET # BLD AUTO: 324 THOUSANDS/UL (ref 149–390)
PMV BLD AUTO: 9.9 FL (ref 8.9–12.7)
POTASSIUM SERPL-SCNC: 4.1 MMOL/L (ref 3.5–5.3)
PROT SERPL-MCNC: 7 G/DL (ref 6.4–8.2)
PROTHROMBIN TIME: 26.5 SECONDS (ref 12.1–14.4)
RBC # BLD AUTO: 4.26 MILLION/UL (ref 3.81–5.12)
SODIUM SERPL-SCNC: 137 MMOL/L (ref 136–145)
TRIGL SERPL-MCNC: 75 MG/DL
WBC # BLD AUTO: 14.02 THOUSAND/UL (ref 4.31–10.16)

## 2017-11-08 PROCEDURE — 80053 COMPREHEN METABOLIC PANEL: CPT

## 2017-11-08 PROCEDURE — 85610 PROTHROMBIN TIME: CPT

## 2017-11-08 PROCEDURE — 36415 COLL VENOUS BLD VENIPUNCTURE: CPT

## 2017-11-08 PROCEDURE — 80061 LIPID PANEL: CPT

## 2017-11-08 PROCEDURE — 85027 COMPLETE CBC AUTOMATED: CPT

## 2017-11-20 ENCOUNTER — ALLSCRIPTS OFFICE VISIT (OUTPATIENT)
Dept: OTHER | Facility: OTHER | Age: 82
End: 2017-11-20

## 2017-11-21 NOTE — PROGRESS NOTES
Assessment    1  Hypertension (401 9) (I10)   2  Osteoporosis (733 00) (M81 0)   3  Leukocytosis (288 60) (D72 829)    Plan  Health Maintenance    · After you empty your bladder, wait a moment and try to go again  Do not strain or push to empty  ;Status:Active; Requested for:20Nov2017;    · Begin a bladder training program to help you control your urgency  Start by urinating every 2 hours  ;Status:Complete;   Done: 00SLC4341   · Eat a low fat and low cholesterol diet ; Status:Complete;   Done: 52LHI9322   · It is important that you drink enough fluids to stay healthy ; Status:Complete;   Done: 45SAE5139   · The plan of care for falls is detailed in the plan and/or discussion section of today's note  ;Status:Complete;   Done: 13DFD9472   · There are many exercise options for seniors ; Status:Complete;   Done: 18WIF5121   · There ways to avoid falling ; Status:Complete;   Done: 57OZH3959   · These are things you can do to prevent falls in and around the home ; Status:Complete;   Done:20Nov2017   · We recommend that you bring your body mass index down to 26 ; Status:Complete;   Done:20Nov2017   · We recommend that you follow these steps to lower your risk of osteoporosis  ; Status:Complete;  Done: 17SWT4826  Hypertension    · (1) COMPREHENSIVE METABOLIC PANEL; Status:Active; Requested for:21Nov2017;   Leukocytosis    · (1) CBC/ PLT (NO DIFF); Status:Active; Requested for:20Nov2017;   Screening for genitourinary condition    · *VB - Urinary Incontinence Screen (Dx Z13 89 Screen for UI); Status:Active; Requestedfor:20Nov2017;   Screening for neurological condition    · *VB - Fall Risk Assessment  (Dx Z13 89 Screen for Neurologic Disorder); Status:Active; Requestedfor:20Nov2017;     Discussion/Summary  Discussion Summary:   essential HTN - stable on present regimen of clonidine 0 1mg daily, diltiazem 240mg daily, furosemide 40mg daily, losartan 50mg daily and Metoprolol tartrate 25mg twice daily  Monitor BMP  osteoporosis with vitamin D deficiency and compression fracture- she is on maintenance vitamin D and next DXA due 5/2018, [facility]  Continue with prolia, failed Evista leukocytosis - suspect due to epidural injection obtained two weeks prior to her labs  Recommend repeat CBC  Persistent AFib -she is rate controlled on metoprolol and diltiazem  She is on Coumadin, INR is followed by a [facility]O  Chronic low back pain due to arthritis and stenosis s/p epidural injections -continue follow-up by pain management  Medication SE Review and Pt Understands Tx: Possible side effects of new medications were reviewed with the patient/guardian today  The treatment plan was reviewed with the patient/guardian  The patient/guardian understands and agrees with the treatment plan      Chief Complaint  Chief Complaint Chronic Condition [facility]: Patient is here today for follow up of chronic conditions described in HPI  History of Present Illness  HPI: 79yo female with HTN, urinary frequency, low back pain, osteoporosis, vitamin D deficiency, persistent atrial fibrillation, anxiety and osteoporosis here for follow up care  she took high doses of vitamin D and is now on maintenance vitamin D along with prolia, last injection was earlier this month by [facility]  First dose was in May 2017  is noted to have elevated WBC, had epidural injection 10/26/17  She denies fever  Hypertension (Follow-Up): The patient presents for follow-up of essential hypertension  The patient states she has been stable with her blood pressure control since the last visit  She has no comorbid illnesses  Symptoms: The patient is currently asymptomatic  Home monitoring: The patient is not checking blood pressure at home  Medications: the patient is adherent with her medication regimen  -- She denies medication side effects   Medication(s): a diuretic,-- a beta blocking agent,-- a calcium channel blocker,-- an ACE inhibitor-- and-- an angiotensin receptor blocker  Additional History: home bp around 140/80s  Review of Systems  Complete-Female:  Constitutional: recent --Ulb weight loss  ENT: hearing loss  Cardiovascular: negative  Respiratory: shortness of breath during exertion  Gastrointestinal: negative  Genitourinary: urinary frequency-- and-- urinary urgency  Musculoskeletal: lower back pain  Neurological: paresthesias, but-- no headache-- and-- no dizziness  Psychiatric: negative  Active Problems    1  Allergic rhinitis (477 9) (J30 9)   2  Anxiety (300 00) (F41 9)   3  Atrial fibrillation (427 31) (I48 91)   4  Bilateral lower extremity edema (782 3) (R60 0)   5  Decreased hearing (389 9) (H91 90)   6  Encounter for routine gynecological examination (V72 31) (Z01 419)   7  Encounter for screening mammogram for malignant neoplasm of breast (V76 12) (Z12 31)   8  Esophageal reflux (530 81) (K21 9)   9  Glaucoma (365 9) (H40 9)   10  Hypertension (401 9) (I10)   11  Incomplete emptying of bladder (788 21) (R33 9)   12  Lipoma (214 9) (D17 9)   13  Lower back pain (724 2) (M54 5)   14  Mitral regurgitation (424 0) (I34 0)   15  Need for immunization against influenza (V04 81) (Z23)   16  Osteoporosis (733 00) (M81 0)   17  Screening for genitourinary condition (V81 6) (Z13 89)   18  Screening for neurological condition (V80 09) (Z13 89)   19  Sensorineural hearing loss of both ears (389 18) (H90 3)   20  Smear, vaginal, as part of routine gynecological examination (V76 47) (Z12 72)   21  T12 compression fracture (805 2) (S22 080A)   22  Tenosynovitis of finger (727 05) (M65 9)   23  Tricuspid regurgitation (397 0) (I07 1)   24  Urinary frequency (788 41) (R35 0)   25  Vitamin D deficiency (268 9) (E55 9)    Past Medical History  1  History of Abdominal pain, LLQ (left lower quadrant) (789 04) (R10 32)   2  History of Acute upper respiratory infection (465 9) (J06 9)   3  History of Arthritis (V13 4)   4  History of Birth History   5   History of Boil, breast (680 2) (N61 1)   6  History of Cellulitis of left elbow (682 3) (L03 114)   7  History of Community acquired pneumonia (5) (J18 9)   8  History of Edema of left lower extremity (782 3) (R60 0)   9  History of Encounter for routine gynecological examination (V72 31) (Z01 419)   10  History of Gastroenteritis (558 9) (K52 9)   11  History of abdominal pain (V13 89) (Z87 898)   12  History of common cold (V12 09) (Z86 19)   13  History of cough   14  History of dermatitis (V13 3) (Z87 2)   15  History of fever (V13 89) (Z87 898)   16  History of hypertension (V12 59) (Z86 79)   17  History of osteopenia (V13 59) (Z87 39)   18  History of shortness of breath (V13 89) (Z87 898)   19  History of shortness of breath (V13 89) (Z87 898)   20  History of urinary tract infection (V13 02) (Z87 440)   21  History of Immunization, pneumococcus and influenza (V06 6) (Z23)   22  History of Left leg cellulitis (682 6) (L03 116)   23  History of Periodontal abscess (523 31) (K05 219)   24  History of Rash (782 1) (R21)   25  History of Reported A Previous Heart Murmur   26  History of Reported Pap Smear   27  History of Right hand pain (729 5) (V70 769)  Active Problems And Past Medical History Reviewed: The active problems and past medical history were reviewed and updated today  Surgical History  1  History of Cataract Surgery   2  History of Hemorrhoidectomy   3  History of Inguinal Hernia Repair   4  History of Total Abdominal Hysterectomy  Surgical History Reviewed: The surgical history was reviewed and updated today  Family History  Mother    1  Family history of Colon Cancer (V16 0)  Father    2  Family history of Acute Myocardial Infarction (V17 3)  Daughter    3  Family history of multiple sclerosis (V17 2) (Z82 0)  Family History Reviewed: The family history was reviewed and updated today         Social History     · Denied: History of Alcohol Use (History)   · Former smoker (V15 82) (J88 861)   · Denied: History of Home Environment Domestic Violence   · Denied: History of Marital History - Currently    · Retired From Work   ·   Social History Reviewed: The social history was reviewed and is unchanged  Current Meds   1  Calcium Citrate + Oral Tablet; Therapy: (Recorded:58Rau9672) to Recorded   2  CloNIDine HCl - 0 1 MG Oral Tablet; Take one tablet daily; Therapy: 01YCH2031 to (Sunny Friday)  Requested for: 87Cwx3360; Last Rx:79Lpu4865 Ordered   3  DilTIAZem HCl ER Coated Beads 240 MG Oral Capsule Extended Release 24 Hour; take 1 capsule daily; Therapy: 98TKG1006 to ([facility])  Requested for: 45BFZ9578; Last Rx:07Nov2017 Ordered   4  Fluticasone Propionate 50 MCG/ACT Nasal Suspension; USE 1 SPRAY IN EACH NOSTRIL TWICE DAILY; Therapy: 62Kqp0703 to (Evaluate:26Nhr0819)  Requested for: 35NZK0060; Last Rx:23Mar2016 Ordered   5  Furosemide 40 MG Oral Tablet; TAKE 1 TABLET DAILY; Therapy: 00CTG8411 to (Evaluate:32Gmj8798)  Requested for: 27Jun2017; Last SF:44SPA4541 Ordered   6  Losartan Potassium 50 MG Oral Tablet; TAKE 1 TABLET DAILY AS DIRECTED; Therapy: 81PKW5876 to (Evaluate:21Jan2018)  Requested for: 36Tch0366; Last Rx:67Rpk6998 Ordered   7  Metoprolol Tartrate 25 MG Oral Tablet; TAKE 1 TABLET TWICE DAILY; Therapy: 88CTO7221 to ([facility])  Requested for: 91KJQ4289; Last Rx:02Nov2017 Ordered   8  Multi Vitamin/Minerals Oral Tablet; ONCE DAILY; Therapy: 43WTC3561 to (Last Rip Alcantara)  Requested for: 21Jun2011 Ordered   9  NexIUM 40 MG Oral Capsule Delayed Release; TAKE 1 CAPSULE DAILY; Therapy: (Recorded:02Xul2518) to Recorded   10  ProAir  (90 Base) MCG/ACT Inhalation Aerosol Solution; INHALE TWO PUFFS EVERY 4 TO 6  HOURS AS NEEDED; Therapy: 12ISI0845 to (Cherylle Fall)  Requested for: 44Edg6889; Last Rx:54Pjg8926  Ordered   11  Timolol Maleate 0 5 % Ophthalmic Gel Forming Solution; Therapy: 06PSB2029 to Recorded   12   Tylenol 325 MG Oral Tablet; Therapy: 42LNH5613 to Recorded   13  Vitamin D (Ergocalciferol) 00192 UNIT Oral Capsule; Therapy: (Recorded:95Sya1277) to Recorded   14  Warfarin Sodium 2 5 MG Oral Tablet; Take 1 to 2 tablets daily by mouth or as directed by physician; Therapy: 57Mgx4686 to (Evaluate:29Whc1964)  Requested for: 31Vut8641; Last Rx:03Sgf2321; Status:  ACTIVE - Renewal Denied, Retrospective Authorization Ordered  Medication List Reviewed: The medication list was reviewed and updated today  Allergies  1  Aspirin TABS   2  Codeine Derivatives   3  Erythromycin TABS   4  Macrolides   5  Penicillins   6  Salicylates   7  Sulfa Drugs  8  Seasonal    Vitals  Vital Signs    Recorded: 20Nov2017 01:51PM   Temperature 96 8 F   Heart Rate 69   Respiration 16   Systolic 735   Diastolic 60   Height 5 ft 3 in   Weight 172 lb    BMI Calculated 30 47   BSA Calculated 1 81   O2 Saturation 98       Physical Exam   Constitutional  General appearance: No acute distress, well appearing and well nourished  well developed-- and-- obese  Head and Face  Head and face: Normal  -- wears glasses  Eyes  Conjunctiva and lids: No swelling, erythema or discharge  Ears, Nose, Mouth, and Throat  External inspection of ears and nose: Normal    Otoscopic examination: Abnormal   The left external canal had a cerumen impaction  Hearing: Abnormal  -- wears bilateral hearing aids  Nasal mucosa, septum, and turbinates: Normal without edema or erythema  Lips, teeth, and gums: Normal, good dentition  Oropharynx: Normal with no erythema, edema, exudate or lesions  Neck  Neck: Supple, symmetric, trachea midline, no masses  Thyroid: Normal, no thyromegaly  Pulmonary  Respiratory effort: No increased work of breathing or signs of respiratory distress  Auscultation of lungs: Clear to auscultation  Cardiovascular  Auscultation of heart: Abnormal   The heart rate was normal  The rhythm was irregularly irregular    Carotid pulses: 2+ bilaterally  Pedal pulses: 2+ bilaterally  Examination of extremities for edema and/or varicosities: Abnormal   bilateral pretibial 1+ pitting edema  varicosities noted bilaterally  Chest deferred by patient  Abdomen  Abdomen: Non-tender, no masses  The abdomen was obese  Bowel sounds were normal  The abdomen was soft and nontender  The abdomen was normal to percussion  Lymphatic  Palpation of lymph nodes in neck: No lymphadenopathy  Musculoskeletal  Joints, bones, and muscles: Abnormal   Appearance - increased kyphosis  Neurologic Grossly intact  Cortical function: Normal mental status  There is no cognitive impairment  The patient achieved a score of 30 / 30 on the MMSE  Level of consciousness: normal   Psychiatric  Orientation to person, place, and time: Normal    Mood and affect: Normal        Results/Data  PHQ-2 Adult Depression Screening 20Nov2017 02:25PM Mariah Yancey     Test Name Result Flag Reference   PHQ-2 Adult Depression Score 0       Over the last two weeks, how often have you been bothered by any of the following problems? Little interest or pleasure in doing things: Not at all - 0 Feeling down, depressed, or hopeless: Not at all - 0   PHQ-2 Adult Depression Screening Negative       (1) COMPREHENSIVE METABOLIC PANEL 92NGT0339 51:73RK [facility]    Order Number: [id number]     Test Name Result Flag Reference   SODIUM 137 mmol/L  136-145   POTASSIUM 4 1 mmol/L  3 5-5 3   CHLORIDE 104 mmol/L  100-108   CARBON DIOXIDE 28 mmol/L  21-32   ANION GAP (CALC) 5 mmol/L  4-13   BLOOD UREA NITROGEN 21 mg/dL  5-25   CREATININE 0 73 mg/dL  0 60-1 30   Standardized to IDMS reference method   CALCIUM 8 2 mg/dL L 8 3-10 1   BILI, TOTAL 0 76 mg/dL  0 20-1 00   ALK PHOSPHATAS 87 U/L     ALT (SGPT) 15 U/L  12-78   Specimen collection should occur prior to Sulfasalazine and/or Sulfapyridine administration due to the potential for falsely depressed results     AST(SGOT) 10 U/L  5-45 Specimen collection should occur prior to Sulfasalazine administration due to the potential for falsely depressed results  ALBUMIN 3 3 g/dL L 3 5-5 0   TOTAL PROTEIN 7 0 g/dL  6 4-8 2   eGFR 74 ml/min/1 73sq m       [facility] Disease Education Program recommendations are as follows: GFR calculation is accurate only with a steady state creatinine Chronic Kidney disease less than 60 ml/min/1 73 sq  meters Kidney failure less than 15 ml/min/1 73 sq  meters  GLUCOSE FASTING 90 mg/dL  65-99   Specimen collection should occur prior to Sulfasalazine administration due to the potential for falsely depressed results  Specimen collection should occur prior to Sulfapyridine administration due to the potential for falsely elevated results  (1) LIPID PANEL FASTING W DIRECT LDL REFLEX 36KFD2014 10:02AM Mariah ZAPATA Order Number: [id number]     Test Name Result Flag Reference   CHOLESTEROL 141 mg/dL     LDL CHOLESTEROL CALCULATED 75 mg/dL  0-100     Triglyceride:       Normal <150 mg/dl  Borderline High 150-199 mg/dl  High 200-499 mg/dl  Very High >499 mg/dl   Cholesterol:      Desirable <200 mg/dl   Borderline High 200-239 mg/dl   High >239 mg/dl   HDL Cholesterol:      High>59 mg/dL   Low <41 mg/dL   HDL Cholesterol:      High>59 mg/dL   Low <41 mg/dL   This screening LDL is a calculated result  It does not have the accuracy of the Direct Measured LDL in the monitoring of patients with hyperlipidemia and/or statin therapy  Direct Measure LDL (EMC671) must be ordered separately in these patients  TRIGLYCERIDES 75 mg/dL  <=150   Specimen collection should occur prior to N-Acetylcysteine or Metamizole administration due to the potential for falsely depressed results  HDL,DIRECT 51 mg/dL  40-60   Specimen collection should occur prior to Metamizole administration due to the potential for falsley depressed results       (1) CBC/ PLT (NO DIFF) 51IRX2763 10:02AM Kim Granda Order Number: [id number]     Test Name Result Flag Reference   HEMATOCRIT 40 8 %  34 8-46 1   HEMOGLOBIN 13 7 g/dL  11 5-15 4   MCHC 33 6 g/dL  31 4-37 4   MCH 32 2 pg  26 8-34 3   MCV 96 fL  82-98   PLATELET COUNT 117 Thousands/uL  149-390   RBC COUNT 4 26 Million/uL  3 81-5 12   RDW 13 2 %  11 6-15 1   WBC COUNT 14 02 Thousand/uL H 4 31-10 16   MPV 9 9 fL  8 9-12 7     Health Management  Health Maintenance   * Dexa Scan Axial Skel (Hip, Pelvis, Spine); every 2 years; Next Due: 13XWC0738; Overdue  Medicare Annual Wellness Visit; every 1 year; Next Due: [id number];  Overdue    Future Appointments    Date/Time Provider Specialty Site   05/21/2018 01:30 PM Yosi Rivas DO Internal Medicine [facility] INTERNAL MED       Signatures   Electronically signed by : Queen Nuno DO; Nov 20 2017  4:09PM EST                       (Author)

## 2017-11-27 ENCOUNTER — GENERIC CONVERSION - ENCOUNTER (OUTPATIENT)
Dept: OTHER | Facility: OTHER | Age: 82
End: 2017-11-27

## 2017-12-07 ENCOUNTER — APPOINTMENT (OUTPATIENT)
Dept: LAB | Facility: HOSPITAL | Age: 82
End: 2017-12-07
Payer: MEDICARE

## 2017-12-07 ENCOUNTER — TRANSCRIBE ORDERS (OUTPATIENT)
Dept: LAB | Facility: HOSPITAL | Age: 82
End: 2017-12-07

## 2017-12-07 DIAGNOSIS — D72.829 ELEVATED WHITE BLOOD CELL COUNT: ICD-10-CM

## 2017-12-07 DIAGNOSIS — I48.91 ATRIAL FIBRILLATION (HCC): ICD-10-CM

## 2017-12-07 LAB
ERYTHROCYTE [DISTWIDTH] IN BLOOD BY AUTOMATED COUNT: 13.8 % (ref 11.6–15.1)
HCT VFR BLD AUTO: 41.1 % (ref 34.8–46.1)
HGB BLD-MCNC: 13.9 G/DL (ref 11.5–15.4)
INR PPP: 2.74 (ref 0.86–1.16)
MCH RBC QN AUTO: 32.3 PG (ref 26.8–34.3)
MCHC RBC AUTO-ENTMCNC: 33.8 G/DL (ref 31.4–37.4)
MCV RBC AUTO: 95 FL (ref 82–98)
PLATELET # BLD AUTO: 345 THOUSANDS/UL (ref 149–390)
PMV BLD AUTO: 9.6 FL (ref 8.9–12.7)
PROTHROMBIN TIME: 29.4 SECONDS (ref 12.1–14.4)
RBC # BLD AUTO: 4.31 MILLION/UL (ref 3.81–5.12)
WBC # BLD AUTO: 9.13 THOUSAND/UL (ref 4.31–10.16)

## 2017-12-07 PROCEDURE — 85027 COMPLETE CBC AUTOMATED: CPT

## 2017-12-07 PROCEDURE — 36415 COLL VENOUS BLD VENIPUNCTURE: CPT

## 2017-12-07 PROCEDURE — 85610 PROTHROMBIN TIME: CPT

## 2017-12-08 DIAGNOSIS — I48.91 ATRIAL FIBRILLATION (HCC): ICD-10-CM

## 2018-01-09 NOTE — PROGRESS NOTES
REPORT NAME: Progress Notes Report  VISIT DATE: 8/18/2017  VISIT TIME: 2:07 PM EDT  PATIENT NAME: Cely Estrada RECORD NUMBER: [id number]  YOB: 1929  AGE: 80  REFERRING PHYSICIAN: Joanne Cornejo  SUPERVISING CLINICIAN: [address] PROVIDER: Yesenia Gutiérrez  PATIENT HOME ADDRESS: [address] [place] [address] , [place] [address] , [address] [facility], [address] [facility]  PATIENT HOME PHONE: [phone]  SOCIAL SECURITY NUMBER:   DIAGNOSIS 1: Unspecified atrial fibrillation / I48 91  DIAGNOSIS 2:   INR RANGE: 2 - 3  INR GOAL: 2 5  TREATMENT START DATE:   TREATMENT END DATE:   NEXT VISIT: 9/15/2017  [facility]  VISIT RESULTS  ENCOUNTER NUMBER:   TEST LOCATION: [facility]  TEST TYPE:   VISIT TYPE:   CURRENT INR: 2 44 PROTIME:   SPECIMEN COL AND RPT DATE: 8/18/2017 2:07 PM  EDT  VITAL SIGNS  PULSE:  BP: / WEIGHT:  HEIGHT:  TEMP:   CURRENT ANTICOAGULANT DOSING SCHEDULE  DOSE SIZE: 2 5mg    ANTICOAGULANT TYPE: WARFARIN  DOSING REGIMEN  Sun       Mon Tues Wed Thurs Fri       Sat  Total/Wk  5         2 5       2 5       2 5       5         2 5       2 5       22 5  PATIENT MEDICATION INSTRUCTION: Yes  PATIENT NUTRITIONAL COUNSELING: No  PATIENT BRUISING INSTRUCTION: No  LAST EDUCATION DATE:   PREVIOUS VISIT INFORMATION  VISITDATE  INRGoal INR   Sun    Mon Tues Wed Thurs Fri    Sat  Total/wk  8/18/2017   2 5     2 44  5      2 5    2 5    2 5    5      2 5    2 5  22 5  7/10/2017   2 5     2 8   5      2 5    2 5    2 5    5      2 5    2 5  22 5  6/21/2017   2 5     3 09  5      2 5    2 5    2 5    5      2 5    2 5  22 5  6/5/2017    2 5     2 5   5      2 5    5      2 5    5      2 5    2 5  25  ADDITIONAL PREVIOUS VISIT INFORMATION  VISITDATE   PRIMARY RX               DOSE      CrCl  8/18/2017   WARFARIN                 2 5mg  7/10/2017   WARFARIN                 2 5mg  6/21/2017   WARFARIN                 2 5mg  6/5/2017    WARFARIN 2 5mg  OTHER CURRENT MEDICATIONS:  WARFARIN  PROGRESS NOTES: gave dosage to pt  retest inr in 4 wks    PATIENT INSTRUCTIONS:   TEST LOCATION: [facility] [address], , ,   INBOUND LAB DATA:  Lab       Lab Value Col Date                 Rpt Date                 Lab  Reference Range  Electronically signed byMargot Mena on 8/18/2017 2:07 PM EDT

## 2018-01-09 NOTE — PROGRESS NOTES
REPORT NAME: Progress Notes Report  VISIT DATE: 6/21/2017  VISIT TIME: 1:50 PM EDT  PATIENT NAME: Ezar Estrada RECORD NUMBER: [id number]  YOB: 1929  AGE: 80  REFERRING PHYSICIAN: Joanne Cornejo  SUPERVISING CLINICIAN: [address] PROVIDER: [facility]  PATIENT HOME ADDRESS: [address] [place] [address] , [place] [address] , [address] [facility], [address] [facility]  PATIENT HOME PHONE: [phone]  SOCIAL SECURITY NUMBER:   DIAGNOSIS 1: Unspecified atrial fibrillation / I48 91  DIAGNOSIS 2:   INR RANGE: 2 - 3  INR GOAL: 2 5  TREATMENT START DATE:   TREATMENT END DATE:   NEXT VISIT: 7/12/2017  Ankur Valero Cardiology  VISIT RESULTS  ENCOUNTER NUMBER:   TEST LOCATION: [facility]  TEST TYPE:   VISIT TYPE:   CURRENT INR: 3 09 PROTIME:   SPECIMEN COL AND RPT DATE: 6/21/2017 1:50 PM  EDT  VITAL SIGNS  PULSE:  BP: / WEIGHT:  HEIGHT:  TEMP:   CURRENT ANTICOAGULANT DOSING SCHEDULE  DOSE SIZE: 2 5mg    ANTICOAGULANT TYPE: WARFARIN  DOSING REGIMEN  Sun       Mon Tues Wed Thurs Fri       Sat  Total/Wk  5         2 5       2 5       2 5       5         2 5       2 5       22 5  PATIENT MEDICATION INSTRUCTION: No  PATIENT NUTRITIONAL COUNSELING: No  PATIENT BRUISING INSTRUCTION: No  LAST EDUCATION DATE:   PREVIOUS VISIT INFORMATION  VISITDATE  INRGoal INR   Sun    Mon Tues Wed Thurs Fri    Sat  Total/wk  6/21/2017   2 5     3 09  5      2 5    2 5    2 5    5      2 5    2 5  22 5  6/5/2017    2 5     2 5   5      2 5    5      2 5    5      2 5    2 5  25  5/19/2017   2 5     1 7   5      2 5    2 5    2 5    5      5      2 5  25  5/10/2017   2 5     2 64  5      2 5    2 5    2 5    2 5    2 5    2 5  20  5      2 5    2 5    2 5    5      2 5    2 5  22 5  ADDITIONAL PREVIOUS VISIT INFORMATION  VISITDATE   PRIMARY RX               DOSE      CrCl  6/21/2017   WARFARIN                 2 5mg  6/5/2017    WARFARIN                 2 5mg  5/19/2017   WARFARIN 2 5mg  5/10/2017   WARFARIN                 2 5mg  OTHER CURRENT MEDICATIONS:  WARFARIN  PROGRESS NOTES: l/m for pt, retest inr in 3 wks    PATIENT INSTRUCTIONS:   TEST LOCATION: [facility], , ,   INBOUND LAB DATA:  Lab       Lab Value Col Date                 Rpt Date                 Lab  Reference Range  Electronically signed byPoncho Campbell on 6/21/2017 1:50 PM EDT

## 2018-01-10 ENCOUNTER — TRANSCRIBE ORDERS (OUTPATIENT)
Dept: LAB | Facility: HOSPITAL | Age: 83
End: 2018-01-10

## 2018-01-10 ENCOUNTER — APPOINTMENT (OUTPATIENT)
Dept: LAB | Facility: HOSPITAL | Age: 83
End: 2018-01-10
Attending: INTERNAL MEDICINE
Payer: MEDICARE

## 2018-01-10 DIAGNOSIS — I48.91 ATRIAL FIBRILLATION (HCC): ICD-10-CM

## 2018-01-10 LAB
INR PPP: 3.11 (ref 0.86–1.16)
PROTHROMBIN TIME: 32.5 SECONDS (ref 12.1–14.4)

## 2018-01-10 PROCEDURE — 85610 PROTHROMBIN TIME: CPT

## 2018-01-10 PROCEDURE — 36415 COLL VENOUS BLD VENIPUNCTURE: CPT

## 2018-01-10 NOTE — PROGRESS NOTES
REPORT NAME: Progress Notes Report  VISIT DATE: 5/10/2017  VISIT TIME: 2:29 PM EDT  PATIENT NAME: Maynor Estrada RECORD NUMBER: [id number]  YOB: 1929  AGE: 80  REFERRING PHYSICIAN: Joanne Cornejo  SUPERVISING CLINICIAN: [address] PROVIDER: Carrillo Mckeon  PATIENT HOME ADDRESS: [address] [place] [address] , [place] [address] , [facility], [address]  PATIENT HOME PHONE: [phone]  SOCIAL SECURITY NUMBER:   DIAGNOSIS 1: Unspecified atrial fibrillation / I48 91  DIAGNOSIS 2:   INR RANGE: 2 - 3  INR GOAL: 2 5  TREATMENT START DATE:   TREATMENT END DATE:   NEXT VISIT: 5/22/2017  Stevan Masters Cardiology  VISIT RESULTS  ENCOUNTER NUMBER:   TEST LOCATION: [facility]  TEST TYPE:   VISIT TYPE:   CURRENT INR: 2 64 PROTIME:   SPECIMEN COL AND RPT DATE: 5/10/2017 2:29 PM  EDT  VITAL SIGNS  PULSE:  BP: / WEIGHT:  HEIGHT:  TEMP:   CURRENT ANTICOAGULANT DOSING SCHEDULE  DOSE SIZE: 2 5mg    ANTICOAGULANT TYPE: WARFARIN  DOSING REGIMEN  Sun       Mon Tues Wed Thurs Fri       Sat  Total/Wk  5         2 5       2 5       2 5       2 5       2 5       2 5       20  5         2 5       2 5       2 5       5         2 5       2 5       22 5  PATIENT MEDICATION INSTRUCTION: Yes  PATIENT NUTRITIONAL COUNSELING: No  PATIENT BRUISING INSTRUCTION: No  LAST EDUCATION DATE:   PREVIOUS VISIT INFORMATION  VISITDATE  INRGoal INR   Sun    Mon Tues Wed Thurs  Fri    Sat  Total/wk  5/10/2017   2 5     2 64  5      2 5    2 5    2 5    2 5    2 5    2 5  20  5      2 5    2 5    2 5    5      2 5    2 5  22 5  4/19/2017   2 5     1 89  5      2 5    2 5    2 5    5      2 5    2 5  22 5  4/7/2017    2 5     3 33  2 5    2 5    2 5    2 5    5      2 5    2 5  20  3/24/2017   2 5     3 15  5      2 5    2 5    2 5    5      1 25   2 5  21 25  5      2 5    2 5    2 5    5      2 5    2 5  22 5  ADDITIONAL PREVIOUS VISIT INFORMATION  VISITDATE   PRIMARY RX               DOSE CrCl  5/10/2017   WARFARIN                 2 5mg  4/19/2017   WARFARIN                 2 5mg  4/7/2017    WARFARIN                 2 5mg  3/24/2017   WARFARIN                 2 5mg  OTHER CURRENT MEDICATIONS:  WARFARIN  PROGRESS NOTES: gave dosage to pt  pt is on clindamycin for cellulitis on  arm  pt will test when she is back from [place]    PATIENT INSTRUCTIONS:   TEST LOCATION: [place] [address], , ,   INBOUND LAB DATA:  Lab       Lab Value Col Date                 Rpt Date                 Lab  Reference Range  Electronically signed bySanto Longo on 5/10/2017 2:29 PM EDT

## 2018-01-11 NOTE — PROGRESS NOTES
REPORT NAME: Patient Visit Summary Report   VISIT DATE: 6/21/2016  VISIT TIME: 4:00 PM EDT  PATIENT NAME: Fide Lee, [address] RECORD NUMBER: [id number]  SOCIAL SECURITY NUMBER:   YOB: 1929  AGE: 80  REFERRING PHYSICIAN: Joanne Cornejo  SUPERVISING CLINICIAN: [address] PROFESSIONAL: Yesenia Gutiérrez  PATIENT HOME ADDRESS: [address] [place] [address] , [address], [address]  PATIENT HOME PHONE: [phone]  DIAGNOSIS 1: Unspecified atrial fibrillation / I48 91  DIAGNOSIS 2:   DIAGNOSIS 3:   DIAGNOSIS 4:   INR RANGE: 2 - 3  INR GOAL: 2 5  TREATMENT START DATE:   TREATMENT END DATE:   NEXT VISIT:       VISIT RESULTS   ENCOUNTER NUMBER:   TEST LOCATION: [facility]  TEST TYPE:   VISIT TYPE:   CURRENT INR: 2 68 PROTIME:   SPECIMEN COL AND RPT DATE: 6/21/2016 4:00 PM  EDT    VITAL SIGNS  PULSE:  B/P:  WEIGHT:  HEIGHT:  TEMP:     CURRENT ANTICOAGULANT DOSING SCHEDULE  DOSE SIZE: 2 5mg    ANTICOAGULANT TYPE: WARFARIN  DOSING REGIMEN  Sun       Mon Tues Wed Thurs Fri       Sat  Total/Wk  5         2 5       2 5       2 5       5         2 5       2 5       22 5    PATIENT MEDICATION INSTRUCTION: No  PATIENT NUTRITIONAL COUNSELING: No  PATIENT BRUISING INSTRUCTION: No      LAST EDUCATION DATE:       PREVIOUS VISIT INFORMATION  VISITDATE   INR Goal  INR   Sun     Mon Tues Wed Thurs   Fri  Sat     Total/wk  6/21/2016   2 5       2 68  5       2 5     2 5     2 5     5       2 5  2 5     22 5  5/24/2016   2 5       2 6   5       2 5     2 5     2 5     5       2 5  2 5     22 5  4/27/2016   2 5       2 06  5       2 5     2 5     5       5       2 5  2 5     25  5       2 5     2 5     2 5     5       2 5  2 5     22 5  3/29/2016   2 5       2 54  5       2 5     2 5     2 5     5       2 5  2 5     22 5    ADDITIONAL PREVIOUS VISIT INFORMATION  VISITDATE   PRIMARY RX               DOSE      CrCl  6/21/2016   WARFARIN                 2 5mg 5/24/2016   WARFARIN                 2 5mg               4/27/2016   WARFARIN                 2 5mg               3/29/2016   WARFARIN                 2 5mg                   OTHER CURRENT MEDICATIONS: WARFARIN      PROGRESS NOTES: l/m for pt, retest in 4 wks      PATIENT INSTRUCTIONS:     TEST LOCATION: [facility]    Electronically signed by: Debra Paredes on 6/21/2016 at 4:00 PM EDT

## 2018-01-11 NOTE — PROGRESS NOTES
REPORT NAME: Patient Visit Summary Report   VISIT DATE: 2/24/2017  VISIT TIME: 2:20 PM EST  PATIENT NAME: Briseida Laird, [address] RECORD NUMBER: [id number]  SOCIAL SECURITY NUMBER:   YOB: 1929  AGE: 80  REFERRING PHYSICIAN: Joanne Cornejo  SUPERVISING CLINICIAN: [address] [facility] PROFESSIONAL: [address] [facility]  PATIENT HOME ADDRESS: [address] [place] [address] , [facility] , [place], [address] [facility] [facility]  PATIENT HOME PHONE: [phone]  DIAGNOSIS 1: Unspecified atrial fibrillation / I48 91  DIAGNOSIS 2:   DIAGNOSIS 3:   DIAGNOSIS 4:   INR RANGE: 2 - 3  INR GOAL: 2 5  TREATMENT START DATE:   TREATMENT END DATE:   NEXT VISIT: 3/24/2017  [facility]    VISIT RESULTS   ENCOUNTER NUMBER:   TEST LOCATION: [facility] [place]  TEST TYPE:   VISIT TYPE:   CURRENT INR: 2 14 PROTIME:   SPECIMEN COL AND RPT DATE: 2/24/2017 2:20 PM  EST    VITAL SIGNS  PULSE:  B/P:  WEIGHT:  HEIGHT:  TEMP:     CURRENT ANTICOAGULANT DOSING SCHEDULE  DOSE SIZE: 2 5mg    ANTICOAGULANT TYPE: WARFARIN  DOSING REGIMEN  Sun       Mon Tues Wed Thurs Fri       Sat  Total/Wk  5         2 5       2 5       2 5       5         2 5       2 5       22 5    PATIENT MEDICATION INSTRUCTION: Yes  PATIENT NUTRITIONAL COUNSELING: No  PATIENT BRUISING INSTRUCTION: No      LAST EDUCATION DATE:       PREVIOUS VISIT INFORMATION  VISITDATE   INR Goal  INR   Sun     Mon Tues Wed Thurs   Fri  Sat     Total/wk  2/24/2017   2 5       2 14  5       2 5     2 5     2 5     5       2 5  2 5     22 5  2/7/2017    2 5       2 12  5       2 5     2 5     2 5     5       2 5  2 5     22 5  1/10/2017   2 5       3 17  5       2 5     2 5     HOLD    5       2 5  2 5     20  5       2 5     2 5     2 5     5       2 5  2 5     22 5  12/20/2016  2 5       3 06  5       2 5     2 5     2 5     5       2 5  2 5     22 5    ADDITIONAL PREVIOUS VISIT INFORMATION  VISITDATE   PRIMARY RX               DOSE      CrCl  2/24/2017 WARFARIN                 2 5mg               2/7/2017    WARFARIN                 2 5mg               1/10/2017   WARFARIN                 2 5mg               12/20/2016  WARFARIN                 2 5mg                   OTHER CURRENT MEDICATIONS: WARFARIN      PROGRESS NOTES: gave dosage to pt  retest inr in 4 wks      PATIENT INSTRUCTIONS:     TEST LOCATION: [facility]    Electronically signed by: [facility] on 2/24/2017 at 2:20 PM EST

## 2018-01-11 NOTE — PROGRESS NOTES
REPORT NAME: Patient Visit Summary Report   VISIT DATE: 7/21/2016  VISIT TIME: 3:37 PM EDT  PATIENT NAME: Percy Clemons, [address] RECORD NUMBER: [id number]  SOCIAL SECURITY NUMBER:   YOB: 1929  AGE: 80  REFERRING PHYSICIAN: Joanne Cornejo  SUPERVISING CLINICIAN: [address] [facility] PROFESSIONAL: [facility]  PATIENT HOME ADDRESS: [address] [place] [address] , [facility] [address] , [place], [address] [facility]  PATIENT HOME PHONE: [phone]  DIAGNOSIS 1: Unspecified atrial fibrillation / I48 91  DIAGNOSIS 2:   DIAGNOSIS 3:   DIAGNOSIS 4:   INR RANGE: 2 - 3  INR GOAL: 2 5  TREATMENT START DATE:   TREATMENT END DATE:   NEXT VISIT: 8/18/2016  [facility]    VISIT RESULTS   ENCOUNTER NUMBER:   TEST LOCATION: [facility]  TEST TYPE:   VISIT TYPE:   CURRENT INR: 2 29 PROTIME:   SPECIMEN COL AND RPT DATE: 7/21/2016 3:37 PM  EDT    VITAL SIGNS  PULSE:  B/P:  WEIGHT:  HEIGHT:  TEMP:     CURRENT ANTICOAGULANT DOSING SCHEDULE  DOSE SIZE: 2 5mg    ANTICOAGULANT TYPE: WARFARIN  DOSING REGIMEN  Sun       Mon Tues Wed Thurs Fri       Sat  Total/Wk  5         2 5       2 5       2 5       5         2 5       2 5       22 5    PATIENT MEDICATION INSTRUCTION: No  PATIENT NUTRITIONAL COUNSELING: No  PATIENT BRUISING INSTRUCTION: No      LAST EDUCATION DATE:       PREVIOUS VISIT INFORMATION  VISITDATE   INR Goal  INR   Sun     Mon Tues Wed Thurs Fri  Sat     Total/wk  7/21/2016   2 5       2 29  5       2 5     2 5     2 5     5       2 5  2 5     22 5  6/21/2016   2 5       2 68  5       2 5     2 5     2 5     5       2 5  2 5     22 5  5/24/2016   2 5       2 6   5       2 5     2 5     2 5     5       2 5  2 5     22 5  4/27/2016   2 5       2 06  5       2 5     2 5     5       5       2 5  2 5     25  5       2 5     2 5     2 5     5       2 5  2 5     22 5    ADDITIONAL PREVIOUS VISIT INFORMATION  VISITDATE   PRIMARY RX               DOSE      CrCl  7/21/2016   WARFARIN 2 5mg               6/21/2016   WARFARIN                 2 5mg               5/24/2016   WARFARIN                 2 5mg               4/27/2016   WARFARIN                 2 5mg                   OTHER CURRENT MEDICATIONS: WARFARIN      PROGRESS NOTES: l/m for pt, retest inr in 4 wks      PATIENT INSTRUCTIONS:     TEST LOCATION: [facility]    Electronically signed by: Catie Hare on 7/21/2016 at 3:37 PM EDT

## 2018-01-12 VITALS
DIASTOLIC BLOOD PRESSURE: 80 MMHG | SYSTOLIC BLOOD PRESSURE: 125 MMHG | HEART RATE: 82 BPM | BODY MASS INDEX: 33.22 KG/M2 | TEMPERATURE: 98.2 F | WEIGHT: 187.5 LBS | OXYGEN SATURATION: 95 % | HEIGHT: 63 IN | RESPIRATION RATE: 16 BRPM

## 2018-01-12 VITALS
DIASTOLIC BLOOD PRESSURE: 80 MMHG | HEART RATE: 90 BPM | WEIGHT: 188 LBS | SYSTOLIC BLOOD PRESSURE: 120 MMHG | RESPIRATION RATE: 16 BRPM | OXYGEN SATURATION: 95 % | TEMPERATURE: 97.7 F | BODY MASS INDEX: 33.31 KG/M2 | HEIGHT: 63 IN

## 2018-01-12 DIAGNOSIS — I48.91 ATRIAL FIBRILLATION (HCC): ICD-10-CM

## 2018-01-12 NOTE — PROGRESS NOTES
Assessment    1  Encounter for preventive health examination (V70 0) (Z00 00)    Plan  Atrial fibrillation    · (1) CBC/ PLT (NO DIFF); Status:Active; Requested for:29Mdk9017; Health Maintenance    · Begin a bladder training program to help you control your urgency  Start by urinating  every 2 hours ; Status:Complete;   Done: 46IXS6818   · Eat a low fat and low cholesterol diet ; Status:Complete;   Done: 17WUZ7854   · It is important that you drink enough fluids to stay healthy ; Status:Complete;   Done:  32GKO6664   · The plan of care for falls is detailed in the plan and/or discussion section of today's note ;  Status:Complete;   Done: 55SCN6100   · There are many exercise options for seniors ; Status:Complete;   Done: 56QAU7162   · There ways to avoid falling ; Status:Complete;   Done: 64EHQ1431   · These are things you can do to prevent falls in and around the home ; Status:Complete;    Done: 96MDQ9844   · We recommend that you follow these steps to lower your risk of osteoporosis  ;  Status:Complete;   Done: 14RZJ4987  Hypertension    · (1) COMPREHENSIVE METABOLIC PANEL; Status:Active; Requested for:03Kja3052;    · (1) LIPID PANEL FASTING W DIRECT LDL REFLEX; Status:Active; Requested  for:71Xtv7724;   Screening for genitourinary condition    · *VB-Urinary Incontinence Screen (Dx V81 6 Screen for UI); Status:Active; Requested  for:79Hpb0940;   Screening for neurological condition    · *VB - Fall Risk Assessment  (Dx V80 09 Screen for Neurologic Disorder); Status:Active; Requested for:38Gzg4503;     Discussion/Summary    Depression screen - negative     screen - has urinary frequency on diuretics    Fall screen - has had two falls this past year, mechanical  Patient not interested in PT at this time  BLE edema - improved  Likely dependent, elevate legs as much as possible  WIll continue on furosemide     Impression: Subsequent Annual Wellness Visit, with preventive exam as well as age and risk appropriate counseling completed  Cardiovascular screening and counseling: the risks and benefits of screening were discussed and due for a lipid panel  Diabetes screening and counseling: the risks and benefits of screening were discussed and due for blood glucose  Colorectal cancer screening and counseling: screening not indicated and due to advanced age  Breast cancer screening and counseling: the risks and benefits of screening were discussed, due for a screening mammogram and patient has appointment for mammogram tomorrow  Cervical cancer screening and counseling: screening not indicated and due to advanced age  Osteoporosis screening and counseling: the risks and benefits of screening were discussed, screening is current, counseling was given on obtaining adequate amounts of calcium and vitamin D on a daily basis and counseling was given on the importance of regular weightbearing exercise  Abdominal aortic aneurysm screening and counseling: screening not indicated  Glaucoma screening and counseling: the risks and benefits of screening were discussed and ophthalmologist referral    HIV screening and counseling: screening not indicated  Immunizations: the risks and benefits of influenza vaccination were discussed with the patient, influenza vaccine is up to date this year, influenza vaccination is recommended annually, the lifetime pneumococcal vaccine has been completed, hepatitis B vaccination series is not indicated at this time due to the patient's low risk of anna the disease, the risks and benefits of the Zostavax vaccine were discussed with the patient, the patient declines the Zostavax vaccine, the risks and benefits of the Td vaccine were discussed with the patient, the patient declines the Td vaccine, Last had 2005, the risks and benefits of the Tdap vaccine were discussed with the patient and the patient declines the Tdap vaccine  Advance Directive Planning: complete and up to date  Advice and education were given regarding fall risk reduction, increasing physical activity and nutrition (non-diabetic)  She was referred to none today  Medical Equipment/Suppliers: none today  Patient Discussion: plan discussed with the patient, follow-up visit needed in 4 months, 45 minute visit, greater than half of the time was spent on counseling  History of Present Illness  HPI: 79yo female with h/o HTN, urinary frequency, low back pain, persistent atrial fibrillation, anxiety, hyponatremia and osteoporosis here for subsequent medicare wellness visit and follow up of BLE edema  She has been started on furosemide 40mg daily which she has noticed decreased swelling  She also notes that swelling is not present during in the morning and worsens in the evening  BLE duplex negative for clots and echo unremarkable  Welcome to [employer] and Wellness Visits: The patient is being seen for the subsequent annual wellness visit  Medicare Screening and Risk Factors   Hospitalizations: she has been previously hospitalizied, she has been hospitalized 1 times and Hospitalized 12/2015 for CAP  Once per lifetime medicare screening tests: not eligible  Medicare Screening Tests Risk Questions   Abdominal aortic aneurysm risk assessment: none indicated  Osteoporosis risk assessment: , female gender and over 48years of age  HIV risk assessment: none indicated  Drug and Alcohol Use: The patient is a former cigarette smoker and quit smoking 51  She has 20 pack year(s) of cigarette use  The patient reports never drinking alcohol  She has never used illicit drugs  Diet and Physical Activity: Current diet includes well balanced meals and low salt food choices  She is sedentary  Mood Disorder and Cognitive Impairment Screening: Geriatric Depression Scale   Yes, the patient is satisfied with Her life  No, the patient has not dropped any activities or interests      No, the patient does not feel that their life is empty  No, the patient does not often get bored  Yes, the patient is hopeful about the future  No, the patient is not bothered by thoughts in their head  Yes, the patient is in good spirits most of the time  No, the patient is not afraid something bad is going to happen to them  Yes, the patient feels happy most of the time  No, the patient does not often feel helpless  No, the patient sanabria not often get restless and fidgety  No, the patient likes to go out and try new things  No, the patient does not worry about the future  No, the patient does not feel that they have more problems with their memory than most    Yes, the patient thinks its wonderful to be alive now  No, the patient does not feel downhearted or blue  No, the patient does not feel worthless the way they are currently  Yes, the patient finds that life is very exciting  No, the patient does not worry a lot about the past    No, it is not hard for the patient to get started on new projects  No, the patient does not feel full of energy  Point = 1  No, the patient does not feel their situation is hopeless  No, the patient does not feel that most people are better off then they are  No, the patient does not frequently get upset about the little things  No, the patient does not frequently feel like crying  No, the patient does not have any problems concentrating  No, the patient does not enjoy getting up in the morning  Point = 1  No, the patient enjoys social gatherings  Yes, the patient finds it easy to make decisions  Score 3  Normal 0 - 9, Mild Depression 10 - 19, Severe Depression 20 - 30   Functional Ability/Level of Safety: Hearing is a hearing aid is used  She reports hearing difficulties   The patient is currently able to drive with limitations, but able to do activities of daily living without limitations, able to do instrumental activities of daily living without limitations and able to participate in social activities without limitations  Activities of daily living details: Limits driving to daylight hours, but does not need help using the phone, no transportation help needed, does not need help shopping, no meal preparation help needed, does not need help doing housework, does not need help doing laundry, does not need help managing medications and does not need help managing money  Fall risk factors: The patient fell 2 times in the past 12 months  Home safety risk factors:  Lives independently in an apartment, but grab bars in the bathroom  Advance Directives: Advance directives: living will, durable power of  for health care directives and advance directives  Co-Managers and Medical Equipment/Suppliers: See Patient Care Team   Reviewed Updated [facility]:   Last Medicare Wellness Visit Information was reviewed, patient interviewed and updates made to the record today  Preventive Quality Program 65 and Older: Falls Risk: The patient fell 2 times in the past 12 months  Fell twice over the past four weeks  Marvina Push in the bathtub  Urinary Incontinence Symptoms includes: urinary frequency        Patient Care Team    Care Team Member Role Specialty Office Number   [facility]  Internal Medicine [phone]   Richard Gaston MD  Cardiology [phone] [facility]  Dermatology [phone]   Carola Jones MD  Obstetrics/Gynecology [phone]   Yesi Stoll MD  Anesthesia [phone]   Cheryl Meléndez MD  Ophthalmology [phone]   Marianela Madsen MD  Colon and Rectal Surgery [phone]     Review of Systems    Constitutional: recent weight loss  ENT: hearing loss  Cardiovascular: lower extremity edema, but no chest pain and no palpitations  Respiratory: shortness of breath during exertion  Gastrointestinal: negative  Genitourinary: urinary frequency  Musculoskeletal: lower back pain     Neurological: paresthesias, but no headache and no dizziness  Psychiatric: negative  Active Problems    1  Allergic rhinitis (477 9) (J30 9)   2  Anxiety (300 00) (F41 9)   3  Atrial fibrillation (427 31) (I48 91)   4  Bilateral lower extremity edema (782 3) (R60 0)   5  Change in stool caliber (787 99) (R19 4)   6  Cough (786 2) (R05)   7  Decreased hearing (389 9) (H91 90)   8  Encounter for routine gynecological examination (V72 31) (Z01 419)   9  Encounter for screening mammogram for malignant neoplasm of breast (V76 12)   (Z12 31)   10  Encounter for screening mammogram for malignant neoplasm of breast (V76 12)    (Z12 31)   11  Esophageal reflux (530 81) (K21 9)   12  Glaucoma (365 9) (H40 9)   13  Hypertension (401 9) (I10)   14  Hypokalemia (276 8) (E87 6)   15  Hyponatremia (276 1) (E87 1)   16  Immunization, pneumococcus and influenza (V06 6) (Z23)   17  Incomplete emptying of bladder (788 21) (R33 9)   18  Lipoma (214 9) (D17 9)   19  Lower back pain (724 2) (M54 5)   20  Mitral regurgitation (424 0) (I34 0)   21  Need for immunization against influenza (V04 81) (Z23)   22  Osteoporosis (733 00) (M81 0)   23  Otalgia of left ear (388 70) (H92 02)   24  Screening for osteoporosis (V82 81) (Z13 820)   25  Sensorineural hearing loss of both ears (389 18) (H90 3)   26  Smear, vaginal, as part of routine gynecological examination (V76 47) (Z12 72)   27  Tenosynovitis of finger (727 05) (M65 9)   28  Urinary frequency (788 41) (R35 0)   29   Visit for screening mammogram (V76 12) (Z12 31)    Past Medical History    · History of Abdominal pain, LLQ (left lower quadrant) (789 04) (R10 32)   · History of Acute upper respiratory infection (465 9) (J06 9)   · History of Arthritis (V13 4)   · History of Birth History   · History of Boil, breast (680 2) (N61)   · History of Community acquired pneumonia (486) (J18 9)   · History of Edema of left lower extremity (782 3) (R60 0)   · History of Encounter for routine gynecological examination (V72 31) (Z01 419)   · History of Gastroenteritis (558 9) (K52 9)   · History of abdominal pain (V13 89) (K81 273)   · History of common cold (V12 09) (Z86 19)   · History of dermatitis (V13 3) (Z87 2)   · History of fever (V13 89) (D86 964)   · History of hypertension (V12 59) (Z86 79)   · History of osteopenia (V13 59) (Z87 39)   · History of shortness of breath (V13 89) (Q39 217)   · History of shortness of breath (V13 89) (Q63 049)   · History of urinary tract infection (V13 02) (Z87 440)   · History of Left leg cellulitis (682 6) (L03 116)   · History of Periodontal abscess (523 31) (K05 21)   · History of Rash (782 1) (R21)   · History of Reported A Previous Heart Murmur   · History of Reported Pap Smear    The active problems and past medical history were reviewed and updated today  Surgical History    · History of Cataract Surgery   · History of Hemorrhoidectomy   · History of Inguinal Hernia Repair   · History of Total Abdominal Hysterectomy    The surgical history was reviewed and updated today  Family History  Mother    · Family history of Colon Cancer (V16 0)  Father    · Family history of Acute Myocardial Infarction (V17 3)  Daughter    · Family history of multiple sclerosis (V17 2) (Z82 0)    The family history was reviewed and updated today  Social History    · Denied: History of Alcohol Use (History)   · Former smoker (V15 82) (H54 332)   · Denied: History of Home Environment Domestic Violence   · Denied: History of Marital History - Currently    · Retired From Work   ·   The social history was reviewed and is unchanged  Current Meds   1  Calcium 600-200 MG-UNIT Oral Tablet; Therapy: 50YCY2583 to Recorded   2  CloNIDine HCl - 0 1 MG Oral Tablet; Take one tablet daily  Requested for: 72IZN0409;   Last Rx:08Mar2016 Ordered   3  Diltiazem HCl ER Coated Beads 240 MG Oral Capsule Extended Release 24 Hour;   TAKE 1 CAPSULE DAILY;    Therapy: 34XSZ0394 to (Evaluate:14Nov2016)  Requested for: 80BFD5097; Last   Rx:86Pai5730 Ordered   4  Fluticasone Propionate 50 MCG/ACT Nasal Suspension; USE 1 SPRAY IN EACH   NOSTRIL TWICE DAILY; Therapy: 27Xmb4788 to (Evaluate:59Rdh9736)  Requested for: 25WKV2287; Last   Rx:23Mar2016 Ordered   5  Furosemide 40 MG Oral Tablet; TAKE 1 TABLET DAILY; Therapy: 52ZIZ4086 to (Janelle Cliche)  Requested for: 78FDG7851; Last   PQ:28NSA5641 Ordered   6  Metoprolol Tartrate 25 MG Oral Tablet; TAKE 1 TABLET TWICE DAILY; Therapy: 62RRJ4789 to ([place])  Requested for: 01YTU6895; Last   Rx:56Mzk2176 Ordered   7  Multi Vitamin/Minerals Oral Tablet; ONCE DAILY; Therapy: 90ISK9199 to (Last Fide Balsam)  Requested for: 21Jun2011 Ordered   8  NexIUM 40 MG Oral Capsule Delayed Release; TAKE 1 CAPSULE DAILY; Therapy: (Recorded:10Bsl7490) to Recorded   9  Potassium Chloride ER 20 MEQ Oral Tablet Extended Release; Take 1 tablet daily; Therapy: 22HKB9125 to (Janelle Cliche)  Requested for: 68BDJ3533; Last   IK:22GOB4064 Ordered   10  ProAir  (90 Base) MCG/ACT Inhalation Aerosol Solution; INHALE TWO PUFFS    EVERY 4 TO 6 HOURS AS NEEDED; Therapy: 52YBK4205 to (Evaluate:24Mar2016)  Requested for: 68LJW2683; Last    Rx:29Xzr7233 Ordered   11  Timolol Maleate 0 5 % Ophthalmic Gel Forming Solution; Therapy: 16OXX2850 to Recorded   12  Tylenol 325 MG Oral Tablet; Therapy: 15BRG1348 to Recorded   13  Warfarin Sodium 2 5 MG Oral Tablet; Take 1 to 2 tablets daily by mouth or as directed by    physician; Therapy: 20YAJ0442 to (26 643679)  Requested for: 44OOW5894; Last    Rx:66Fmk3615; Status: ACTIVE - Retrospective Authorization Ordered    The medication list was reviewed and updated today  Allergies    1  Aspirin TABS   2  Codeine Derivatives   3  Erythromycin TABS   4  Macrolides   5  Penicillins   6  Salicylates   7  Sulfa Drugs    Immunizations   ** Printed in Appendix #1 below       Vitals  Signs [Data Includes: Current Encounter] Temperature: 98 2 F  Heart Rate: 85  Respiration: 12  Systolic: 742  Diastolic: 78  Height: 5 ft 3 in  Weight: 202 lb 8 0 oz  BMI Calculated: 35 87  BSA Calculated: 1 95  O2 Saturation: 96    Physical Exam    Constitutional   General appearance: No acute distress, well appearing and well nourished  well developed and obese  Head and Face   Head and face: Normal   wears glasses  Ears, Nose, Mouth, and Throat   External inspection of ears and nose: Normal     Otoscopic examination: Abnormal   The left external canal had a cerumen impaction  Hearing: Abnormal   wears bilateral hearing aids  Nasal mucosa, septum, and turbinates: Normal without edema or erythema  Lips, teeth, and gums: Normal, good dentition  Oropharynx: Normal with no erythema, edema, exudate or lesions  Neck   Neck: Supple, symmetric, trachea midline, no masses  Thyroid: Normal, no thyromegaly  Pulmonary   Respiratory effort: No increased work of breathing or signs of respiratory distress  Auscultation of lungs: Clear to auscultation  Cardiovascular   Auscultation of heart: Abnormal   The heart rate was normal  The rhythm was irregularly irregular  Pedal pulses: 2+ bilaterally  Examination of extremities for edema and/or varicosities: Abnormal   bilateral pretibial 1+ pitting edema  varicosities noted bilaterally  Chest deferred by patient  Abdomen   Abdomen: Non-tender, no masses  The abdomen was obese  Bowel sounds were normal  The abdomen was soft and nontender  The abdomen was normal to percussion  Lymphatic   Palpation of lymph nodes in neck: No lymphadenopathy  Neurologic Grossly intact  Cortical function: Normal mental status  There is no cognitive impairment  The patient achieved a score of 30 / 30 on the MMSE     Psychiatric   Orientation to person, place, and time: Normal     Mood and affect: Normal        Results/Data  Falls Risk Assessment (Dx V80 09 Screen for Neurologic Disorder) 60LGI0083 01: 45PM User, Ahs     Test Name Result Flag Reference   Falls Risk      2 or more falls past year       Procedure    Procedure: Visual Acuity Test    Follow-up  n/a for subsequent medicare wellness visit  [facility]  Health Maintenance   * Dexa Scan Axial Skel (Hip, Pelvis, Spine); every 2 years; Next Due: 07JID5869; Overdue  Medicare Annual Wellness Visit; every 1 year; Next Due: S2694979; Overdue    Future Appointments    Date/Time Provider Specialty Site   2016 10:40 AM YORDY Andino   Cardiology  [facility] [address]     Signatures   Electronically signed by : Arlet Mtz DO; 2016  2:17PM EST                       (Author)    Appendix #1     Patient: Grabiel Alfonso; : 1929; MRN: [id number]      1 2 3 4    Influenza  52Tgc1887 16OXY9554 48Xwz6365 32Cjx3313    PNEUMO (POLY)  2000       Prevnar 13 Intramuscular Suspension  84PMU5070       Tdap  Temporarily Deferred: Pt requests deferral       Zoster  Temporarily Deferred: Pt requests deferral

## 2018-01-12 NOTE — PROGRESS NOTES
REPORT NAME: Patient Visit Summary Report   VISIT DATE: 4/27/2016  VISIT TIME: 3:37 PM EDT  PATIENT NAME: Eze Ryan, [address] RECORD NUMBER: [id number]  SOCIAL SECURITY NUMBER:   YOB: 1929  AGE: 80  REFERRING PHYSICIAN: Joanne Cornejo  SUPERVISING CLINICIAN: Amaury Barriga CARE PROFESSIONAL: [facility]  PATIENT HOME ADDRESS: [address] [place] [address] , [address], [address]  PATIENT HOME PHONE: [phone]  DIAGNOSIS 1: Unspecified atrial fibrillation / I48 91  DIAGNOSIS 2:   DIAGNOSIS 3:   DIAGNOSIS 4:   INR RANGE: 2 - 3  INR GOAL: 2 5  TREATMENT START DATE:   TREATMENT END DATE:   NEXT VISIT: 5/25/2016  [facility]    VISIT RESULTS   ENCOUNTER NUMBER:   TEST LOCATION: [facility]  TEST TYPE:   VISIT TYPE:   CURRENT INR: 2 06 PROTIME:   SPECIMEN COL AND RPT DATE: 4/27/2016 3:37 PM  EDT    VITAL SIGNS  PULSE:  B/P:  WEIGHT:  HEIGHT:  TEMP:     CURRENT ANTICOAGULANT DOSING SCHEDULE  DOSE SIZE: 2 5mg    ANTICOAGULANT TYPE: WARFARIN  DOSING REGIMEN  Sun       Mon Tues Wed Thurs Fri       Sat  Total/Wk  5         2 5       2 5       5         5         2 5       2 5       25  5         2 5       2 5       2 5       5         2 5       2 5       22 5    PATIENT MEDICATION INSTRUCTION: No  PATIENT NUTRITIONAL COUNSELING: No  PATIENT BRUISING INSTRUCTION: No      LAST EDUCATION DATE:       PREVIOUS VISIT INFORMATION  VISITDATE   INR Goal  INR   Sun     Mon Tues Wed Thurs Fri  Sat     Total/wk  4/27/2016   2 5       2 06  5       2 5     2 5     5       5       2 5  2 5     25  5       2 5     2 5     2 5     5       2 5  2 5     22 5  3/29/2016   2 5       2 54  5       2 5     2 5     2 5     5       2 5  2 5     22 5  3/2/2016    2 5       2 15  5       2 5     2 5     2 5     5       2 5  2 5     22 5  2/2/2016    2 5       2 2   5       2 5     2 5     2 5     5       2 5  2 5     22 5    ADDITIONAL PREVIOUS VISIT INFORMATION  VISITDATE   PRIMARY RX               DOSE      CrCl  4/27/2016   WARFARIN                 2 5mg               3/29/2016   WARFARIN                 2 5mg               3/2/2016    WARFARIN                 2 5mg               2/2/2016    WARFARIN                 2 5mg                   OTHER CURRENT MEDICATIONS: WARFARIN      PROGRESS NOTES: l/m for pt to take 5mg tonight, then resume previous dose  retest in 4 wks      PATIENT INSTRUCTIONS:     TEST LOCATION: [place] [address]    Electronically signed by: Brenda Gonzalez on 4/27/2016 at 3:37 PM EDT

## 2018-01-13 VITALS
HEART RATE: 93 BPM | RESPIRATION RATE: 16 BRPM | DIASTOLIC BLOOD PRESSURE: 80 MMHG | WEIGHT: 182.5 LBS | SYSTOLIC BLOOD PRESSURE: 122 MMHG | BODY MASS INDEX: 32.34 KG/M2 | OXYGEN SATURATION: 98 % | TEMPERATURE: 98.1 F | HEIGHT: 63 IN

## 2018-01-13 VITALS
HEART RATE: 73 BPM | DIASTOLIC BLOOD PRESSURE: 76 MMHG | BODY MASS INDEX: 31.79 KG/M2 | HEIGHT: 63 IN | WEIGHT: 179.44 LBS | SYSTOLIC BLOOD PRESSURE: 124 MMHG

## 2018-01-13 NOTE — RESULT NOTES
Verified Results  (1) COMPREHENSIVE METABOLIC PANEL [id number] 34:00GN Leobardo Beard Order Number: [id number]   Order Number: [id number]     Test Name Result Flag Reference   GLUCOSE,RANDM 89 mg/dL     If the patient is fasting, the ADA then defines impaired fasting glucose as > 100 mg/dL and diabetes as > or equal to 123 mg/dL  SODIUM 139 mmol/L  136-145   POTASSIUM 4 1 mmol/L  3 5-5 3   CHLORIDE 100 mmol/L  100-108   CARBON DIOXIDE 31 mmol/L  21-32   ANION GAP (CALC) 8 mmol/L  4-13   BLOOD UREA NITROGEN 21 mg/dL  5-25   CREATININE 0 70 mg/dL  0 60-1 30   Standardized to IDMS reference method   CALCIUM 9 2 mg/dL  8 3-10 1   BILI, TOTAL 0 94 mg/dL  0 20-1 00   ALK PHOSPHATAS 98 U/L     ALT (SGPT) 14 U/L  12-78   AST(SGOT) 18 U/L  5-45   ALBUMIN 3 4 g/dL L 3 5-5 0   TOTAL PROTEIN 6 8 g/dL  6 4-8 2   eGFR Non-African American      >60 0 ml/min/1 73sq [facility] Disease Education Program recommendations are as follows:  GFR calculation is accurate only with a steady state creatinine  Chronic Kidney disease less than 60 ml/min/1 73 sq  meters  Kidney failure less than 15 ml/min/1 73 sq  meters       (1) URINALYSIS (will reflex a microscopy if leukocytes, occult blood, protein or nitrites are not within normal limits) [id number] 03:38PM Mariann Mims    Order Number: [id number]     Test Name Result Flag Reference   COLOR Yellow     CLARITY Cloudy     SPECIFIC GRAVITY UA 1 010  1 003-1 030   PH UA 7 5  4 5-8 0   LEUKOCYTE ESTERASE UA Small A Negative   NITRITE UA Negative  Negative   PROTEIN UA Trace mg/dl A Negative   GLUCOSE UA Negative mg/dl  Negative   KETONES UA Negative mg/dl  Negative   UROBILINOGEN UA 0 2 E U /dl  0 2, 1 0 E U /dl   BILIRUBIN UA Negative  Negative   BLOOD UA Small A Negative   BACTERIA Occasional /hpf  None Seen, Occasional   EPITHELIAL CELLS Occasional /hpf  None Seen, Occasional   RBC UA None Seen /hpf  None Seen   WBC UA 2-4 /hpf A None Seen     (1) BNP [id number] 03:38PM Cindy Burks Order Number: [id number]  TW Order Number: [id number]     Test Name Result Flag Reference    68 pg/mL H <=100       Plan  Bilateral lower extremity edema    · 1,3    · Start: Furosemide 20 MG Oral Tablet; TAKE 1 TABLET DAILY2    · (1) BASIC METABOLIC PROFILE; Status:Active; Requested DDN:[id number]; 1      1 Amended By: January Luther; Jun 13 2016 1:00 PM EST   2 Amended By: January Luther; Jun 14 2016 1:27 PM EST   3 Amended By: January Luther; Jun 14 2016 1:27 PM EST    Discussion/Summary    Discussed results with patient  No change in BLE edema and no SOB  Will do trial of diuretic with repeat bmp in 3-5   days    Addendum 6/14/16: Pharmacist called yesterday to report that dyrenium was expensive  Also states patient is already on a diuretic  Spoke with patient today  She was previously on HCTZ but discontinued when she was hospitalized due to electrolyte abnormalities  She has listed sulfa allergy but had been tolerating HCTZ for several years  Recalls that she developed a rash when she was hospitalized about 15 years ago and it was suspected it was due to sulfa  Plan to rechallenge with furosemide with repeat BMP in 3-5 days  Patient aware to contact office if she develops a rash or any side effects from furosemide  1       1 Amended By: Sydnie Bullock; Jun 14 2016 1:24 PM EST    Signatures   Electronically signed by : Sydnie Bullock DO; Jun 14 2016  1:27PM EST                       (Author)

## 2018-01-13 NOTE — PROGRESS NOTES
REPORT NAME: Progress Notes Report  VISIT DATE: 4/19/2017  VISIT TIME: 2:05 PM EDT  PATIENT NAME: González Estrada RECORD NUMBER: [id number]  YOB: 1929  AGE: 80  REFERRING PHYSICIAN: Joanne Cornejo  SUPERVISING CLINICIAN: [address] PROVIDER: Cassandra Lopez  PATIENT HOME ADDRESS: [address] [place] [address] , [address], [address] [facility]  PATIENT HOME PHONE: [phone]  SOCIAL SECURITY NUMBER:   DIAGNOSIS 1: Unspecified atrial fibrillation / I48 91  DIAGNOSIS 2:   INR RANGE: 2 - 3  INR GOAL: 2 5  TREATMENT START DATE:   TREATMENT END DATE:   NEXT VISIT: 5/10/2017  [facility]  VISIT RESULTS  ENCOUNTER NUMBER:   TEST LOCATION: [facility]  [place]  TEST TYPE:   VISIT TYPE:   CURRENT INR: 1 89 PROTIME:   SPECIMEN COL AND RPT DATE: 4/19/2017 2:05 PM  EDT  VITAL SIGNS  PULSE:  BP: / WEIGHT:  HEIGHT:  TEMP:   CURRENT ANTICOAGULANT DOSING SCHEDULE  DOSE SIZE: 2 5mg    ANTICOAGULANT TYPE: WARFARIN  DOSING REGIMEN  Sun       Mon Tues Wed Thurs Fri       Sat  Total/Wk  5         2 5       2 5       2 5       5         2 5       2 5       22 5  PATIENT MEDICATION INSTRUCTION: No  PATIENT NUTRITIONAL COUNSELING: No  PATIENT BRUISING INSTRUCTION: No  LAST EDUCATION DATE:   PREVIOUS VISIT INFORMATION  VISITDATE  INRGoal INR   Sun    Mon Tues Wed Thurs  Fri    Sat  Total/wk  4/19/2017   2 5     1 89  5      2 5    2 5    2 5    5      2 5    2 5  22 5  4/7/2017    2 5     3 33  2 5    2 5    2 5    2 5    5      2 5    2 5  20  3/24/2017   2 5     3 15  5      2 5    2 5    2 5    5      1 25   2 5  21 25  5      2 5    2 5    2 5    5      2 5    2 5  22 5  2/24/2017   2 5     2 14  5      2 5    2 5    2 5    5      2 5    2 5  22 5  ADDITIONAL PREVIOUS VISIT INFORMATION  VISITDATE   PRIMARY RX               DOSE      CrCl  4/19/2017   WARFARIN                 2 5mg  4/7/2017    WARFARIN                 2 5mg  3/24/2017   WARFARIN 2 5mg  2/24/2017   WARFARIN                 2 5mg  OTHER CURRENT MEDICATIONS:  WARFARIN  PROGRESS NOTES: l/m for pt, retest inr in 3 wks    PATIENT INSTRUCTIONS:   TEST LOCATION: [facility], , ,   INBOUND LAB DATA:  Lab       Lab Value Col Date                 Rpt Date                 Lab  Reference Range  Electronically signed byMargot Mena on 4/19/2017 2:05 PM EDT

## 2018-01-13 NOTE — PROGRESS NOTES
REPORT NAME: Patient Visit Summary Report   VISIT DATE: 3/29/2016  VISIT TIME: 4:06 PM EDT  PATIENT NAME: Terrance Estrada RECORD NUMBER: [id number]  SOCIAL SECURITY NUMBER:   YOB: 1929  AGE: 80  REFERRING PHYSICIAN: Joanne Cornejo  SUPERVISING CLINICIAN: [address] [facility],[address] PROFESSIONAL: Tiffanie Arias  PATIENT HOME ADDRESS: [address] [place] [address] , [place] [address] , [facility], [address]  PATIENT HOME PHONE: [phone]  DIAGNOSIS 1: Unspecified atrial fibrillation / I48 91  DIAGNOSIS 2:   DIAGNOSIS 3:   DIAGNOSIS 4:   INR RANGE: 2 - 3  INR GOAL: 2 5  TREATMENT START DATE:   TREATMENT END DATE:   NEXT VISIT: 4/26/2016  [facility]    VISIT RESULTS   ENCOUNTER NUMBER:   TEST LOCATION: [facility]  TEST TYPE:   VISIT TYPE:   CURRENT INR: 2 54 PROTIME:   SPECIMEN COL AND RPT DATE: 3/29/2016 4:06 PM  EDT    VITAL SIGNS  PULSE:  B/P:  WEIGHT:  HEIGHT:  TEMP:     CURRENT ANTICOAGULANT DOSING SCHEDULE  DOSE SIZE: 2 5mg    ANTICOAGULANT TYPE: WARFARIN  DOSING REGIMEN  Sun       Mon Tues Wed Thurs Fri       Sat  Total/Wk  5         2 5       2 5       2 5       5         2 5       2 5       22 5    PATIENT MEDICATION INSTRUCTION: No  PATIENT NUTRITIONAL COUNSELING: No  PATIENT BRUISING INSTRUCTION: No      LAST EDUCATION DATE:       PREVIOUS VISIT INFORMATION  VISITDATE   INR Goal  INR   Sun     Mon Tues Wed Thurs Fri  Sat     Total/wk  3/29/2016   2 5       2 54  5       2 5     2 5     2 5     5       2 5  2 5     22 5  3/2/2016    2 5       2 15  5       2 5     2 5     2 5     5       2 5  2 5     22 5  2/2/2016    2 5       2 2   5       2 5     2 5     2 5     5       2 5  2 5     22 5  1/5/2016    2 5       2 14  5       2 5     2 5     2 5     5       2 5  2 5     22 5    ADDITIONAL PREVIOUS VISIT INFORMATION  VISITDATE   PRIMARY RX               DOSE      CrCl  3/29/2016   WARFARIN                 2 5mg               3/2/2016    WARFARIN 2 5mg               2/2/2016    WARFARIN                 2 5mg               1/5/2016    WARFARIN                 2 5mg                   OTHER CURRENT MEDICATIONS: WARFARIN      PROGRESS NOTES: l/m for pt   retest in 4 wks    PATIENT INSTRUCTIONS:     TEST LOCATION: [facility]     Electronically signed by: Yesenia Gutiérrez on 4/19/2016 at 4:06 PM EDT

## 2018-01-13 NOTE — PROGRESS NOTES
REPORT NAME: Patient Visit Summary Report   VISIT DATE: 10/13/2016  VISIT TIME: 1:44 PM EDT  PATIENT NAME: Kim [address] [facility] [address] RECORD NUMBER: [id number]  SOCIAL SECURITY NUMBER:   YOB: 1929  AGE: 80  REFERRING PHYSICIAN: Joanne Cornejo  SUPERVISING CLINICIAN: Amaury Barriga CARE PROFESSIONAL: [address] [facility]  PATIENT HOME ADDRESS: [address] [place] [address] , [facility] , [facility], [address] [facility]  PATIENT HOME PHONE: [phone]  DIAGNOSIS 1: Unspecified atrial fibrillation / I48 91  DIAGNOSIS 2:   DIAGNOSIS 3:   DIAGNOSIS 4:   INR RANGE: 2 - 3  INR GOAL: 2 5  TREATMENT START DATE:   TREATMENT END DATE:   NEXT VISIT: [facility]10/2016  [facility]    VISIT RESULTS   ENCOUNTER NUMBER:   TEST LOCATION: [facility]  [place]  TEST TYPE:   VISIT TYPE:   CURRENT INR: 2 36 PROTIME:   SPECIMEN COL AND RPT DATE: 10/13/2016 1:44 PM  EDT    VITAL SIGNS  PULSE:  B/P:  WEIGHT:  HEIGHT:  TEMP:     CURRENT ANTICOAGULANT DOSING SCHEDULE  DOSE SIZE: 2 5mg    ANTICOAGULANT TYPE: WARFARIN  DOSING REGIMEN  Sun       Mon Tues Wed Thurs Fri       Sat  Total/Wk  5         2 5       2 5       2 5       5         2 5       2 5       22 5    PATIENT MEDICATION INSTRUCTION: Yes  PATIENT NUTRITIONAL COUNSELING: No  PATIENT BRUISING INSTRUCTION: No      LAST EDUCATION DATE:       PREVIOUS VISIT INFORMATION  VISITDATE   INR Goal  INR   Sun     Mon Tues Wed Thurs   Fri  Sat     Total/wk  10/13/2016  2 5       2 36  5       2 5     2 5     2 5     5       2 5  2 5     22 5  9/15/2016   2 5       2 79  5       2 5     2 5     2 5     5       2 5  2 5     22 5  8/18/2016   2 5       2 42  5       2 5     2 5     2 5     5       2 5  2 5     22 5  7/21/2016   2 5       2 29  5       2 5     2 5     2 5     5       2 5  2 5     22 5    ADDITIONAL PREVIOUS VISIT INFORMATION  VISITDATE   PRIMARY RX               DOSE      CrCl  10/13/2016  WARFARIN                 2 5mg               9/15/2016 WARFARIN                 2 5mg               8/18/2016   WARFARIN                 2 5mg               7/21/2016   WARFARIN                 2 5mg                   OTHER CURRENT MEDICATIONS: WARFARIN      PROGRESS NOTES: gave dosage to pt  retest in 4 wks      PATIENT INSTRUCTIONS:     TEST LOCATION: [facility]    Electronically signed by: [facility] on 10/13/2016 at 1:44 PM EDT

## 2018-01-14 VITALS
SYSTOLIC BLOOD PRESSURE: 110 MMHG | TEMPERATURE: 96.8 F | WEIGHT: 172 LBS | HEART RATE: 69 BPM | DIASTOLIC BLOOD PRESSURE: 60 MMHG | OXYGEN SATURATION: 98 % | RESPIRATION RATE: 16 BRPM | HEIGHT: 63 IN | BODY MASS INDEX: 30.48 KG/M2

## 2018-01-14 VITALS
SYSTOLIC BLOOD PRESSURE: 140 MMHG | DIASTOLIC BLOOD PRESSURE: 74 MMHG | HEIGHT: 63 IN | OXYGEN SATURATION: 94 % | WEIGHT: 193 LBS | HEART RATE: 81 BPM | BODY MASS INDEX: 34.2 KG/M2

## 2018-01-15 VITALS
TEMPERATURE: 97.3 F | OXYGEN SATURATION: 96 % | BODY MASS INDEX: 33.17 KG/M2 | SYSTOLIC BLOOD PRESSURE: 140 MMHG | DIASTOLIC BLOOD PRESSURE: 90 MMHG | HEART RATE: 101 BPM | WEIGHT: 187.25 LBS

## 2018-01-15 NOTE — PROGRESS NOTES
REPORT NAME: Patient Visit Summary Report   VISIT DATE: 2/2/2016  VISIT TIME: 4:17 PM EST  PATIENT NAME: Kim Court[place] RECORD NUMBER: [id number]  SOCIAL SECURITY NUMBER:   YOB: 1929  AGE: 80  REFERRING PHYSICIAN: Joanne Cornejo  SUPERVISING CLINICIAN: [address] PROFESSIONAL: Дмитрий Mtz  PATIENT HOME ADDRESS: [address] [place] [address] , [address], [address] [facility]  PATIENT HOME PHONE: [phone]  DIAGNOSIS 1: Unspecified atrial fibrillation / I48 91  DIAGNOSIS 2:   DIAGNOSIS 3:   DIAGNOSIS 4:   INR RANGE: 2 - 3  INR GOAL: 2 5  TREATMENT START DATE:   TREATMENT END DATE:   NEXT VISIT: 3/1/2016  [facility]    VISIT RESULTS   ENCOUNTER NUMBER:   TEST LOCATION: [facility]  TEST TYPE:   VISIT TYPE:   CURRENT INR: 2 2 PROTIME:   SPECIMEN COL AND RPT DATE: 2/2/2016 4:17 PM EST    VITAL SIGNS  PULSE:  B/P:  WEIGHT:  HEIGHT:  TEMP:     CURRENT ANTICOAGULANT DOSING SCHEDULE  DOSE SIZE: 2 5mg    ANTICOAGULANT TYPE: WARFARIN  DOSING REGIMEN  Sun       Mon Tues Wed Thurs Fri       Sat  Total/Wk  5         2 5       2 5       2 5       5         2 5       2 5       22 5    PATIENT MEDICATION INSTRUCTION: No  PATIENT NUTRITIONAL COUNSELING: No  PATIENT BRUISING INSTRUCTION: No      LAST EDUCATION DATE:       PREVIOUS VISIT INFORMATION  VISITDATE   INR Goal  INR   Sun     Mon Tues Wed Thurs   Fri  Sat     Total/wk  2/2/2016    2 5       2 2   5       2 5     2 5     2 5     5       2 5  2 5     22 5  1/5/2016    2 5       2 14  5       2 5     2 5     2 5     5       2 5  2 5     22 5  12/16/2015  2 5       2 29  5       2 5     2 5     2 5     5       2 5  2 5     22 5  12/1/2015   2 5       3 32  5       2 5     2 5     2 5     2 5     2 5  2 5     20    ADDITIONAL PREVIOUS VISIT INFORMATION  VISITDATE   PRIMARY RX               DOSE      CrCl  2/2/2016    WARFARIN                 2 5mg               1/5/2016    WARFARIN 2 5mg               12/16/2015  WARFARIN                 2 5mg               12/1/2015   WARFARIN                 2 5mg                   OTHER CURRENT MEDICATIONS: WARFARIN      PROGRESS NOTES: l/m for pt, retest inr in 4 wks    PATIENT INSTRUCTIONS:     TEST LOCATION: [facility]    Electronically signed byPoncho Campbell on 2/2/2016 at 4:17 PM EST

## 2018-01-15 NOTE — PROGRESS NOTES
REPORT NAME: Progress Notes Report  VISIT DATE: 9/15/2017  VISIT TIME: 1:08 PM EDT  PATIENT NAME: Steffany Estrada RECORD NUMBER: [id number]  YOB: 1929  AGE: 80  REFERRING PHYSICIAN: Joanne Cornejo  SUPERVISING CLINICIAN: [address] [facility],[address] PROVIDER: [facility]  PATIENT HOME ADDRESS: [address] [place] [address] , [place] [address] , [facility], [address] [facility]  PATIENT HOME PHONE: [phone]  SOCIAL SECURITY NUMBER:   DIAGNOSIS 1: Unspecified atrial fibrillation / I48 91  DIAGNOSIS 2:   INR RANGE: 2 - 3  INR GOAL: 2 5  TREATMENT START DATE:   TREATMENT END DATE:   NEXT VISIT: 10/13/2017  [facility]  VISIT RESULTS  ENCOUNTER NUMBER:   TEST LOCATION: [facility]  TEST TYPE:   VISIT TYPE:   CURRENT INR: 2 38 PROTIME:   SPECIMEN COL AND RPT DATE: 9/15/2017 1:08 PM  EDT  VITAL SIGNS  PULSE:  BP: / WEIGHT:  HEIGHT:  TEMP:   CURRENT ANTICOAGULANT DOSING SCHEDULE  DOSE SIZE: 2 5mg    ANTICOAGULANT TYPE: WARFARIN  DOSING REGIMEN  Sun       Mon Tues Wed Thurs Fri       Sat  Total/Wk  5         2 5       2 5       2 5       5         2 5       2 5       22 5  PATIENT MEDICATION INSTRUCTION: No  PATIENT NUTRITIONAL COUNSELING: No  PATIENT BRUISING INSTRUCTION: No  LAST EDUCATION DATE:   PREVIOUS VISIT INFORMATION  VISITDATE  INRGoal INR   Sun    Mon Tues Wed Thurs  Fri    Sat  Total/wk  9/15/2017   2 5     2 38  5      2 5    2 5    2 5    5      2 5    2 5  22 5  8/18/2017   2 5     2 44  5      2 5    2 5    2 5    5      2 5    2 5  22 5  7/10/2017   2 5     2 8   5      2 5    2 5    2 5    5      2 5    2 5  22 5  6/21/2017   2 5     3 09  5      2 5    2 5    2 5    5      2 5    2 5  22 5  ADDITIONAL PREVIOUS VISIT INFORMATION  VISITDATE   PRIMARY RX               DOSE      CrCl  9/15/2017   WARFARIN                 2 5mg  8/18/2017   WARFARIN                 2 5mg  7/10/2017   WARFARIN                 2 5mg  6/21/2017   WARFARIN 2 5mg  OTHER CURRENT MEDICATIONS:  WARFARIN  PROGRESS NOTES: l/m for pt  retest inr in 4 wks    PATIENT INSTRUCTIONS:   TEST LOCATION: [facility], , ,   INBOUND LAB DATA:  Lab       Lab Value Col Date                 Rpt Date                 Lab  Reference Range  Electronically signed byTresa Diaz on 9/15/2017 1:08 PM EDT

## 2018-01-15 NOTE — PROGRESS NOTES
REPORT NAME: Patient Visit Summary Report   VISIT DATE: 12/20/2016  VISIT TIME: 3:22 PM EST  PATIENT NAME: Edgar Melgar [address] RECORD NUMBER: [id number]  SOCIAL SECURITY NUMBER:   YOB: 1929  AGE: 80  REFERRING PHYSICIAN: Joanne Cornejo  SUPERVISING CLINICIAN: Amaury Barriga CARE PROFESSIONAL: [facility]  PATIENT HOME ADDRESS: [address] [place] [address] , [address], [address] [facility]  PATIENT HOME PHONE: [phone]  DIAGNOSIS 1: Unspecified atrial fibrillation / I48 91  DIAGNOSIS 2:   DIAGNOSIS 3:   DIAGNOSIS 4:   INR RANGE: 2 - 3  INR GOAL: 2 5  TREATMENT START DATE:   TREATMENT END DATE:   NEXT VISIT: 1/10/2017  [facility]    VISIT RESULTS   ENCOUNTER NUMBER:   TEST LOCATION: [facility]  TEST TYPE:   VISIT TYPE:   CURRENT INR: 3 06 PROTIME:   SPECIMEN COL AND RPT DATE: 12/20/2016 3:22 PM  EST    VITAL SIGNS  PULSE:  B/P:  WEIGHT:  HEIGHT:  TEMP:     CURRENT ANTICOAGULANT DOSING SCHEDULE  DOSE SIZE: 2 5mg    ANTICOAGULANT TYPE: WARFARIN  DOSING REGIMEN  Sun       Mon Tues Wed Thurs Fri       Sat  Total/Wk  5         2 5       2 5       2 5       5         2 5       2 5       22 5    PATIENT MEDICATION INSTRUCTION: Yes  PATIENT NUTRITIONAL COUNSELING: No  PATIENT BRUISING INSTRUCTION: No      LAST EDUCATION DATE:       PREVIOUS VISIT INFORMATION  VISITDATE   INR Goal  INR   Sun     Mon Tues Wed Thurs Fri  Sat     Total/wk  12/20/2016  2 5       3 06  5       2 5     2 5     2 5     5       2 5  2 5     22 5  11/28/2016  2 5       2 7   5       2 5     5       2 5     5       2 5  2 5     25  11/10/2016  2 5       1 87  5       2 5     5       2 5     5       2 5  2 5     25  10/13/2016  2 5       2 36  5       2 5     2 5     2 5     5       2 5  2 5     22 5    ADDITIONAL PREVIOUS VISIT INFORMATION  VISITDATE   PRIMARY RX               DOSE      CrCl  12/20/2016  WARFARIN                 2 5mg               11/28/2016  WARFARIN 2 5mg               11/10/2016  WARFARIN                 2 5mg               10/13/2016  WARFARIN                 2 5mg                   OTHER CURRENT MEDICATIONS: WARFARIN      PROGRESS NOTES: gave dosage to pt, retest inr in 3 wks      PATIENT INSTRUCTIONS:     TEST LOCATION: [address] [place] [address]    Electronically signed by: [facility] on 12/20/2016 at 3:22 PM EST

## 2018-01-15 NOTE — PROGRESS NOTES
Assessment    1  Encounter for preventive health examination (V70 0) (Z00 00)    Plan  Health Maintenance    · After you empty your bladder, wait a moment and try to go again  Do not strain or push to  empty ; Status:Active; Requested for:20Nov2017;    · Begin a bladder training program to help you control your urgency  Start by urinating  every 2 hours ; Status:Complete;   Done: 35GAI1393   · Eat a low fat and low cholesterol diet ; Status:Complete;   Done: 19XUZ2112   · It is important that you drink enough fluids to stay healthy ; Status:Complete;   Done:  99NTF0918   · The plan of care for falls is detailed in the plan and/or discussion section of today's note ;  Status:Complete;   Done: 86UWN4339   · There are many exercise options for seniors ; Status:Complete;   Done: 50FTS8184   · There ways to avoid falling ; Status:Complete;   Done: 55BPV4901   · These are things you can do to prevent falls in and around the home ; Status:Complete;    Done: 57EIP0627   · We recommend that you bring your body mass index down to 26 ; Status:Complete;    Done: 62PCM0966   · We recommend that you follow these steps to lower your risk of osteoporosis  ;  Status:Complete;   Done: 82RKZ8275  Hypertension    · (1) COMPREHENSIVE METABOLIC PANEL; Status:Active; Requested RUBEN:39CWS8379;    · Follow-up visit in 6 months Evaluation and Treatment  Follow-up  Status: Hold For -  Scheduling  Requested for: 80JUT1870  Leukocytosis    · (1) CBC/ PLT (NO DIFF); Status:Active; Requested for:20Nov2017;   Screening for genitourinary condition    · *VB - Urinary Incontinence Screen (Dx Z13 89 Screen for UI); Status:Active; Requested  for:20Nov2017;   Screening for neurological condition    · *VB - Fall Risk Assessment  (Dx Z13 89 Screen for Neurologic Disorder); Status:Active;   Requested for:20Nov2017;     Discussion/Summary    Depression screen - negative    fall screen - negative     screen - has urinary urgency and frequency, noted to be on furosemide  Recommend bladder training and double voiding  Impression: Subsequent Annual Wellness Visit, with preventive exam as well as age and risk appropriate counseling completed  Cardiovascular screening and counseling: the risks and benefits of screening were discussed and screening is current  Diabetes screening and counseling: the risks and benefits of screening were discussed and screening is current  Colorectal cancer screening and counseling: screening not indicated and due to advanced age  Breast cancer screening and counseling: the risks and benefits of screening were discussed and screening is current  Cervical cancer screening and counseling: screening not indicated and due to advanced age  Osteoporosis screening and counseling: the risks and benefits of screening were discussed, screening is current, counseling was given on obtaining adequate amounts of calcium and vitamin D on a daily basis, counseling was given on the importance of regular weightbearing exercise and followed by [facility]  Abdominal aortic aneurysm screening and counseling: screening not indicated  Glaucoma screening and counseling: ophthalmologist referral    HIV screening and counseling: screening not indicated  Immunizations: influenza vaccine is up to date this year, influenza vaccination is recommended annually, the lifetime pneumococcal vaccine has been completed, hepatitis B vaccination series is not indicated at this time due to the patient's low risk of anna the disease, the risks and benefits of the Zostavax vaccine were discussed with the patient, the patient declines the Zostavax vaccine, the risks and benefits of the Td vaccine were discussed with the patient, the patient declines the Td vaccine, the risks and benefits of the Tdap vaccine were discussed with the patient and the patient declines the Tdap vaccine  Advance Directive Planning: complete and up to date   Advice and education were given regarding fall risk reduction, increasing physical activity and nutrition (non-diabetic)  She was referred to none today  Medical Equipment/Suppliers: none today  Patient Discussion: plan discussed with the patient, follow-up visit needed in 6months, 16 minute visit, greater than half of the time was spent on counseling  History of Present Illness  HPI: 79yo female with HTN, urinary frequency, low back pain, osteoporosis, vitamin D deficiency, persistent atrial fibrillation and anxiety here for subsequent AWV  Welcome to [employer] and Wellness Visits: The patient is being seen for the subsequent annual wellness visit  Medicare Screening and Risk Factors   Hospitalizations: she has been previously hospitalizied, she has been hospitalized 0 times and No hospitalizations over past 12 months  Once per lifetime medicare screening tests: not eligible  Medicare Screening Tests Risk Questions   Abdominal aortic aneurysm risk assessment: none indicated  Osteoporosis risk assessment: , female gender and over 48years of age  HIV risk assessment: none indicated  Drug and Alcohol Use: The patient is a former cigarette smoker and quit smoking 52  She has 20 pack year(s) of cigarette use  The patient reports never drinking alcohol  She has never used illicit drugs  Diet and Physical Activity: Current diet includes well balanced meals and low salt food choices  She is sedentary  Mood Disorder and Cognitive Impairment Screening: She denies feeling down, depressed, or hopeless over the past two weeks  She denies feeling little interest or pleasure in doing things over the past two weeks  Functional Ability/Level of Safety: She uses a hearing aid   The patient is currently able to drive with limitations, but able to do activities of daily living without limitations, able to do instrumental activities of daily living without limitations and able to participate in social activities without limitations  Activities of daily living details:  limits driving to daylight hours  Fall risk factors: The patient fell 0 times in the past 12 months  Home safety risk factors:  lives independently in an apartment, but grab bars in the bathroom  Advance Directives: Advance directives: living will, durable power of  for health care directives and advance directives  Co-Managers and Medical Equipment/Suppliers: See Patient Care Team   Reviewed Updated Maximo Si:   Last Medicare Wellness Visit Information was reviewed, patient interviewed and updates made to the record today  Preventive Quality Program 65 and Older: Falls Risk: The patient fell 0 times in the past 12 months  Urinary Incontinence Symptoms includes: urinary frequency and urinary urgency, but no urinary incontinence        Patient Care Team    Care Team Member Role Specialty Office Number   [facility]  Internal Medicine [phone]   Jomar Hogan MD  Cardiology [phone] [facility]  Dermatology [phone]   Jackson Villalpando MD  Obstetrics/Gynecology [phone]   Haylee Street MD  Anesthesia [phone]   Deven Espinoza MD  Ophthalmology [phone]   Pina Martel MD  Colon and Rectal Surgery [phone]     Review of Systems    Constitutional: recent ~Ulb weight loss  ENT: hearing loss  Cardiovascular: negative  Respiratory: shortness of breath during exertion  Gastrointestinal: negative  Genitourinary: urinary frequency and urinary urgency  Musculoskeletal: lower back pain  Neurological: paresthesias, but no headache and no dizziness  Psychiatric: negative  Active Problems    1  Allergic rhinitis (477 9) (J30 9)   2  Anxiety (300 00) (F41 9)   3  Atrial fibrillation (427 31) (I48 91)   4  Bilateral lower extremity edema (782 3) (R60 0)   5  Decreased hearing (389 9) (H91 90)   6  Encounter for routine gynecological examination (V72 31) (Z01 419)   7   Encounter for screening mammogram for malignant neoplasm of breast (V76 12)   (Z12 31)   8  Esophageal reflux (530 81) (K21 9)   9  Glaucoma (365 9) (H40 9)   10  Hypertension (401 9) (I10)   11  Incomplete emptying of bladder (788 21) (R33 9)   12  Lipoma (214 9) (D17 9)   13  Lower back pain (724 2) (M54 5)   14  Mitral regurgitation (424 0) (I34 0)   15  Need for immunization against influenza (V04 81) (Z23)   16  Osteoporosis (733 00) (M81 0)   17  Screening for genitourinary condition (V81 6) (Z13 89)   18  Screening for neurological condition (V80 09) (Z13 89)   19  Sensorineural hearing loss of both ears (389 18) (H90 3)   20  Smear, vaginal, as part of routine gynecological examination (V76 47) (Z12 72)   21  T12 compression fracture (805 2) (S22 080A)   22  Tenosynovitis of finger (727 05) (M65 9)   23  Tricuspid regurgitation (397 0) (I07 1)   24  Urinary frequency (788 41) (R35 0)   25   Vitamin D deficiency (268 9) (E55 9)    Past Medical History    · History of Abdominal pain, LLQ (left lower quadrant) (789 04) (R10 32)   · History of Acute upper respiratory infection (465 9) (J06 9)   · History of Arthritis (V13 4)   · History of Birth History   · History of Boil, breast (680 2) (N61 1)   · History of Cellulitis of left elbow (682 3) (L03 114)   · History of Community acquired pneumonia (486) (J18 9)   · History of Edema of left lower extremity (782 3) (R60 0)   · History of Encounter for routine gynecological examination (V72 31) (Z01 419)   · History of Gastroenteritis (558 9) (K52 9)   · History of abdominal pain (V13 89) (Z87 898)   · History of common cold (V12 09) (Z86 19)   · History of dermatitis (V13 3) (Z87 2)   · History of fever (V13 89) (Z34 940)   · History of hypertension (V12 59) (Z86 79)   · History of osteopenia (V13 59) (Z87 39)   · History of shortness of breath (V13 89) (A11 652)   · History of shortness of breath (V13 89) (N40 201)   · History of urinary tract infection (V13 02) (Z87 440)   · History of Immunization, pneumococcus and influenza (V06 6) (Z23)   · History of Left leg cellulitis (682 6) (L03 116)   · History of Periodontal abscess (523 31) (K05 219)   · History of Rash (782 1) (R21)   · History of Reported A Previous Heart Murmur   · History of Reported Pap Smear    The active problems and past medical history were reviewed and updated today  Surgical History    · History of Cataract Surgery   · History of Hemorrhoidectomy   · History of Inguinal Hernia Repair   · History of Total Abdominal Hysterectomy    The surgical history was reviewed and updated today  Family History  Mother    · Family history of Colon Cancer (V16 0)  Father    · Family history of Acute Myocardial Infarction (V17 3)  Daughter    · Family history of multiple sclerosis (V17 2) (Z82 0)    The family history was reviewed and updated today  Social History    · Denied: History of Alcohol Use (History)   · Former smoker (V15 82) (C30 742)   · Denied: History of Home Environment Domestic Violence   · Denied: History of Marital History - Currently    · Retired From Work   ·   The social history was reviewed and is unchanged  Current Meds   1  Calcium Citrate + Oral Tablet; Therapy: (Recorded:92Nir7635) to Recorded   2  CloNIDine HCl - 0 1 MG Oral Tablet; Take one tablet daily; Therapy: 36FQT6479 to (Michael Sorensen)  Requested for: 95Hvk8842; Last   Rx:16Lzi7416 Ordered   3  DilTIAZem HCl ER Coated Beads 240 MG Oral Capsule Extended Release 24 Hour;   take 1 capsule daily; Therapy: 11DYV0002 to (Los Mckeon)  Requested for: 16AJR5872; Last   Rx:07Nov2017 Ordered   4  Fluticasone Propionate 50 MCG/ACT Nasal Suspension; USE 1 SPRAY IN EACH   NOSTRIL TWICE DAILY; Therapy: 98Vya8305 to (Evaluate:17Eqb9664)  Requested for: 19QMM3368; Last   Rx:23Mar2016 Ordered   5  Furosemide 40 MG Oral Tablet; TAKE 1 TABLET DAILY;    Therapy: 31JVX2564 to (Evaluate:57Pmj5965)  Requested for: 02MUA7805; Last   YB:74ZVA8418 Ordered   6  Losartan Potassium 50 MG Oral Tablet; TAKE 1 TABLET DAILY AS DIRECTED; Therapy: 75PJH1217 to (Evaluate:21Jan2018)  Requested for: 74Vrt5561; Last   Rx:92Nda4571 Ordered   7  Metoprolol Tartrate 25 MG Oral Tablet; TAKE 1 TABLET TWICE DAILY; Therapy: 42MQK4843 to (Cathleen Ruelas)  Requested for: 60UEF2537; Last   Rx:02Nov2017 Ordered   8  Multi Vitamin/Minerals Oral Tablet; ONCE DAILY; Therapy: 00BZU2377 to (Last Ming Choudhary)  Requested for: 21Jun2011 Ordered   9  NexIUM 40 MG Oral Capsule Delayed Release; TAKE 1 CAPSULE DAILY; Therapy: (Recorded:57Wys0443) to Recorded   10  ProAir  (90 Base) MCG/ACT Inhalation Aerosol Solution; INHALE TWO PUFFS    EVERY 4 TO 6 HOURS AS NEEDED; Therapy: 56NCL2928 to (Sher Clay)  Requested for: 76Rfu8085; Last    Rx:37Sth3061 Ordered   11  Timolol Maleate 0 5 % Ophthalmic Gel Forming Solution; Therapy: 85CKP6110 to Recorded   12  Tylenol 325 MG Oral Tablet; Therapy: 77KZQ4086 to Recorded   13  Vitamin D (Ergocalciferol) 78989 UNIT Oral Capsule; Therapy: (Recorded:32Gbq7871) to Recorded   14  Warfarin Sodium 2 5 MG Oral Tablet; Take 1 to 2 tablets daily by mouth or as directed by    physician; Therapy: 83Dqe6512 to (Evaluate:08Thk6532)  Requested for: 09Njd9342; Last    Rx:06Uhk6752; Status: ACTIVE - Renewal Denied, Retrospective Authorization Ordered    The medication list was reviewed and updated today  Allergies    1  Aspirin TABS   2  Codeine Derivatives   3  Erythromycin TABS   4  Macrolides   5  Penicillins   6  Salicylates   7  Sulfa Drugs    8  Seasonal    Immunizations   ** Printed in Appendix #1 below       Vitals  Signs    Temperature: 96 8 F  Heart Rate: 69  Respiration: 16  Systolic: 325  Diastolic: 60  Height: 5 ft 3 in  Weight: 172 lb   BMI Calculated: 30 47  BSA Calculated: 1 81  O2 Saturation: 98    Physical Exam    Constitutional   General appearance: No acute distress, well appearing and well nourished  well developed and obese  Head and Face   Head and face: Normal   wears glasses  Eyes   Conjunctiva and lids: No swelling, erythema or discharge  Ears, Nose, Mouth, and Throat   External inspection of ears and nose: Normal     Otoscopic examination: Abnormal   The left external canal had a cerumen impaction  Hearing: Abnormal   wears bilateral hearing aids  Nasal mucosa, septum, and turbinates: Normal without edema or erythema  Lips, teeth, and gums: Normal, good dentition  Oropharynx: Normal with no erythema, edema, exudate or lesions  Neck   Neck: Supple, symmetric, trachea midline, no masses  Thyroid: Normal, no thyromegaly  Pulmonary   Respiratory effort: No increased work of breathing or signs of respiratory distress  Auscultation of lungs: Clear to auscultation  Cardiovascular   Auscultation of heart: Abnormal   The heart rate was normal  The rhythm was irregularly irregular  Carotid pulses: 2+ bilaterally  Pedal pulses: 2+ bilaterally  Examination of extremities for edema and/or varicosities: Abnormal   bilateral pretibial 1+ pitting edema  varicosities noted bilaterally  Chest deferred by patient  Abdomen   Abdomen: Non-tender, no masses  The abdomen was obese  Bowel sounds were normal  The abdomen was soft and nontender  The abdomen was normal to percussion  Lymphatic   Palpation of lymph nodes in neck: No lymphadenopathy  Musculoskeletal   Joints, bones, and muscles: Abnormal   Appearance - increased kyphosis  Neurologic Grossly intact  Cortical function: Normal mental status  There is no cognitive impairment  The patient achieved a score of 30 / 30 on the MMSE  Level of consciousness: normal    Psychiatric   Orientation to person, place, and time: Normal     Mood and affect: Normal        Procedure    Procedure: Visual Acuity Test    Follow-up  n/a for subsequent AWV  The patient was referred to [facility] Management  Health Maintenance   * Dexa Scan Axial Skel (Hip, Pelvis, Spine); every 2 years; Next Due: 62OOM3703; Overdue  Medicare Annual Wellness Visit; every 1 year; Next Due: [id number];  Overdue    Signatures   Electronically signed by : Queen Nuno DO; 2017  2:28PM EST                       (Author)    Appendix #1     Patient: Saud Patel ; : 1929; MRN: [id number]      1 2 3 4 5 6    Influenza  03-Sep-2013 12-Sep-2014 15-Sep-2015 15-Sep-2015 07-Oct-2016 21-Oct-2017    PCV  2015         PPSV  2000         Tdap  Temporarily Deferred: Pt requests deferral         Zoster  Temporarily Deferred: Pt requests deferral

## 2018-01-16 NOTE — PROGRESS NOTES
REPORT NAME: Patient Visit Summary Report   VISIT DATE: 2/7/2017  VISIT TIME: 2:57 PM EST  PATIENT NAME: Juan Miguel Martinez, [address] RECORD NUMBER: [id number]  SOCIAL SECURITY NUMBER:   YOB: 1929  AGE: 80  REFERRING PHYSICIAN: Joanne Cornejo  SUPERVISING CLINICIAN: [address] [facility] [facility],[address] PROFESSIONAL: [facility]  PATIENT HOME ADDRESS: [address] [place] [address] , [place] [address] , [facility], [address] [facility]  PATIENT HOME PHONE: [phone]  DIAGNOSIS 1: Unspecified atrial fibrillation / I48 91  DIAGNOSIS 2:   DIAGNOSIS 3:   DIAGNOSIS 4:   INR RANGE: 2 - 3  INR GOAL: 2 5  TREATMENT START DATE:   TREATMENT END DATE:   NEXT VISIT: 2/21/2017  [facility]    VISIT RESULTS   ENCOUNTER NUMBER:   TEST LOCATION: [facility]  TEST TYPE:   VISIT TYPE:   CURRENT INR: 2 12 PROTIME:   SPECIMEN COL AND RPT DATE: 2/7/2017 2:57 PM  EST    VITAL SIGNS  PULSE:  B/P:  WEIGHT:  HEIGHT:  TEMP:     CURRENT ANTICOAGULANT DOSING SCHEDULE  DOSE SIZE: 2 5mg    ANTICOAGULANT TYPE: WARFARIN  DOSING REGIMEN  Sun       Mon Tues Wed Thurs Fri       Sat  Total/Wk  5         2 5       2 5       2 5       5         2 5       2 5       22 5    PATIENT MEDICATION INSTRUCTION: Yes  PATIENT NUTRITIONAL COUNSELING: No  PATIENT BRUISING INSTRUCTION: No      LAST EDUCATION DATE:       PREVIOUS VISIT INFORMATION  VISITDATE   INR Goal  INR   Sun     Mon Tues Wed Thurs   Fri  Sat     Total/wk  2/7/2017    2 5       2 12  5       2 5     2 5     2 5     5       2 5  2 5     22 5  1/10/2017   2 5       3 17  5       2 5     2 5     HOLD    5       2 5  2 5     20  5       2 5     2 5     2 5     5       2 5  2 5     22 5  12/20/2016  2 5       3 06  5       2 5     2 5     2 5     5       2 5  2 5     22 5  11/28/2016  2 5       2 7   5       2 5     5       2 5     5       2 5  2 5     25    ADDITIONAL PREVIOUS VISIT INFORMATION  VISITDATE   PRIMARY RX               DOSE      CrCl  2/7/2017    WARFARIN 2 5mg               1/10/2017   WARFARIN                 2 5mg               12/20/2016  WARFARIN                 2 5mg               11/28/2016  WARFARIN                 2 5mg                   OTHER CURRENT MEDICATIONS: WARFARIN      PROGRESS NOTES: gave dosage to pt  pt was off warfarin for 5 days, then she  had a steroid injection  pt has been back on warfarin for 6 days   retest in  2 days    PATIENT INSTRUCTIONS:     TEST LOCATION: [facility]    Electronically signed by: [facility] on 2/7/2017 at 2:57 PM EST

## 2018-01-16 NOTE — PROGRESS NOTES
REPORT NAME: Patient Visit Summary Report   VISIT DATE: 9/15/2016  VISIT TIME: 2:09 PM EDT  PATIENT NAME: Piyush Mullen, [address] RECORD NUMBER: [id number]  SOCIAL SECURITY NUMBER:   YOB: 1929  AGE: 80  REFERRING PHYSICIAN: Joanne Cornejo  SUPERVISING CLINICIAN: [address] PROFESSIONAL: Delmi Vaughan  PATIENT HOME ADDRESS: [address] [place] [address] , [address], [facility], [address] [facility]  PATIENT HOME PHONE: [phone]  DIAGNOSIS 1: Unspecified atrial fibrillation / I48 91  DIAGNOSIS 2:   DIAGNOSIS 3:   DIAGNOSIS 4:   INR RANGE: 2 - 3  INR GOAL: 2 5  TREATMENT START DATE:   TREATMENT END DATE:   NEXT VISIT: 10/13/2016  [facility]    VISIT RESULTS   ENCOUNTER NUMBER:   [facility]  TEST TYPE:   VISIT TYPE:   CURRENT INR: 2 79 PROTIME:   SPECIMEN COL AND RPT DATE: 9/15/2016 2:09 PM  EDT    VITAL SIGNS  PULSE:  B/P:  WEIGHT:  HEIGHT:  TEMP:     CURRENT ANTICOAGULANT DOSING SCHEDULE  DOSE SIZE: 2 5mg    ANTICOAGULANT TYPE: WARFARIN  DOSING REGIMEN  Sun       Mon Tues Wed Thurs Fri       Sat  Total/Wk  5         2 5       2 5       2 5       5         2 5       2 5       22 5    PATIENT MEDICATION INSTRUCTION: Yes  PATIENT NUTRITIONAL COUNSELING: No  PATIENT BRUISING INSTRUCTION: No      LAST EDUCATION DATE:       PREVIOUS VISIT INFORMATION  VISITDATE   INR Goal  INR   Sun     Mon Tues Wed Thurs   Fri  Sat     Total/wk  9/15/2016   2 5       2 79  5       2 5     2 5     2 5     5       2 5  2 5     22 5  8/18/2016   2 5       2 42  5       2 5     2 5     2 5     5       2 5  2 5     22 5  7/21/2016   2 5       2 29  5       2 5     2 5     2 5     5       2 5  2 5     22 5  6/21/2016   2 5       2 68  5       2 5     2 5     2 5     5       2 5  2 5     22 5    ADDITIONAL PREVIOUS VISIT INFORMATION  VISITDATE   PRIMARY RX               DOSE      CrCl  9/15/2016   WARFARIN                 2 5mg               8/18/2016 WARFARIN                 2 5mg               7/21/2016   WARFARIN                 2 5mg               6/21/2016   WARFARIN                 2 5mg                   OTHER CURRENT MEDICATIONS: WARFARIN      PROGRESS NOTES: gave dosage to pt  retest inr in 4 wks  pt to remain on  same dose      PATIENT INSTRUCTIONS:     TEST LOCATION: [facility]     Electronically signed by: Kaden Lara on 9/15/2016 at 2:09 PM EDT

## 2018-01-16 NOTE — PROGRESS NOTES
REPORT NAME: Patient Visit Summary Report   VISIT DATE: 3/2/2016  VISIT TIME: 4:02 PM EST  PATIENT NAME: Edgar Melgar [address] RECORD NUMBER: [id number]  SOCIAL SECURITY NUMBER:   YOB: 1929  AGE: 80  REFERRING PHYSICIAN: Joanne Cornejo  SUPERVISING CLINICIAN: [address] [facility] PROFESSIONAL: Antonieta Lockwood  PATIENT HOME ADDRESS: [address] [place] [address] , [place] [address] , Josy Braga, [address] [facility]  PATIENT HOME PHONE: [phone]  DIAGNOSIS 1: Unspecified atrial fibrillation / I48 91  DIAGNOSIS 2:   DIAGNOSIS 3:   DIAGNOSIS 4:   INR RANGE: 2 - 3  INR GOAL: 2 5  TREATMENT START DATE:   TREATMENT END DATE:   NEXT VISIT: 3/30/2016  [facility]    VISIT RESULTS   ENCOUNTER NUMBER:   TEST LOCATION: [facility]  TEST TYPE:   VISIT TYPE:   CURRENT INR: 2 15 PROTIME:   SPECIMEN COL AND RPT DATE: 3/2/2016 4:02 PM  EST    VITAL SIGNS  PULSE:  B/P:  WEIGHT:  HEIGHT:  TEMP:     CURRENT ANTICOAGULANT DOSING SCHEDULE  DOSE SIZE: 2 5mg    ANTICOAGULANT TYPE: WARFARIN  DOSING REGIMEN  Sun       Mon Tues Wed Thurs Fri       Sat  Total/Wk  5         2 5       2 5       2 5       5         2 5       2 5       22 5    PATIENT MEDICATION INSTRUCTION: No  PATIENT NUTRITIONAL COUNSELING: No  PATIENT BRUISING INSTRUCTION: No      LAST EDUCATION DATE:       PREVIOUS VISIT INFORMATION  VISITDATE   INR Goal  INR   Sun     Mon Tues Wed Thurs   Fri  Sat     Total/wk  3/2/2016    2 5       2 15  5       2 5     2 5     2 5     5       2 5  2 5     22 5  2/2/2016    2 5       2 2   5       2 5     2 5     2 5     5       2 5  2 5     22 5  1/5/2016    2 5       2 14  5       2 5     2 5     2 5     5       2 5  2 5     22 5  12/16/2015  2 5       2 29  5       2 5     2 5     2 5     5       2 5  2 5     22 5    ADDITIONAL PREVIOUS VISIT INFORMATION  VISITDATE   PRIMARY RX               DOSE      CrCl  3/2/2016    WARFARIN                 2 5mg               2/2/2016    WARFARIN 2 5mg               1/5/2016    WARFARIN                 2 5mg               12/16/2015  WARFARIN                 2 5mg                   OTHER CURRENT MEDICATIONS: WARFARIN      PROGRESS NOTES: l/m for pt to retest in 4 wks      PATIENT INSTRUCTIONS:     TEST LOCATION: [facility] [address]    Electronically signed by: [facility] on 3/2/2016 at 4:02 PM EST

## 2018-01-16 NOTE — PROGRESS NOTES
REPORT NAME: Patient Visit Summary Report   VISIT DATE: 8/18/2016  VISIT TIME: 3:41 PM EDT  PATIENT NAME: Kim Courtney [facility] RECORD NUMBER: [id number]  SOCIAL SECURITY NUMBER:   YOB: 1929  AGE: 80  REFERRING PHYSICIAN: Joanne Cornejo  SUPERVISING CLINICIAN: [address] PROFESSIONAL: [facility]  PATIENT HOME ADDRESS: [address] [place] [address] , [place] [address] , [facility], [address] [facility]  PATIENT HOME PHONE: [phone]  DIAGNOSIS 1: Unspecified atrial fibrillation / I48 91  DIAGNOSIS 2:   DIAGNOSIS 3:   DIAGNOSIS 4:   INR RANGE: 2 - 3  INR GOAL: 2 5  TREATMENT START DATE:   TREATMENT END DATE:   NEXT VISIT: 9/15/2016  [facility]    VISIT RESULTS   ENCOUNTER NUMBER:   [facility]  TEST TYPE:   VISIT TYPE:   CURRENT INR: 2 42 PROTIME:   SPECIMEN COL AND RPT DATE: 8/18/2016 3:41 PM  EDT    VITAL SIGNS  PULSE:  B/P:  WEIGHT:  HEIGHT:  TEMP:     CURRENT ANTICOAGULANT DOSING SCHEDULE  DOSE SIZE: 2 5mg    ANTICOAGULANT TYPE: WARFARIN  DOSING REGIMEN  Sun       Mon Tues Wed Thurs Fri       Sat  Total/Wk  5         2 5       2 5       2 5       5         2 5       2 5       22 5    PATIENT MEDICATION INSTRUCTION: Yes  PATIENT NUTRITIONAL COUNSELING: No  PATIENT BRUISING INSTRUCTION: No      LAST EDUCATION DATE:       PREVIOUS VISIT INFORMATION  VISITDATE   INR Goal  INR   Sun     Mon Tues Wed Thurs Fri  Sat     Total/wk  8/18/2016   2 5       2 42  5       2 5     2 5     2 5     5       2 5  2 5     22 5  7/21/2016   2 5       2 29  5       2 5     2 5     2 5     5       2 5  2 5     22 5  6/21/2016   2 5       2 68  5       2 5     2 5     2 5     5       2 5  2 5     22 5  5/24/2016   2 5       2 6   5       2 5     2 5     2 5     5       2 5  2 5     22 5    ADDITIONAL PREVIOUS VISIT INFORMATION  VISITDATE   PRIMARY RX               DOSE      CrCl  8/18/2016   WARFARIN                 2 5mg               7/21/2016 WARFARIN                 2 5mg               6/21/2016   WARFARIN                 2 5mg               5/24/2016   WARFARIN                 2 5mg                   OTHER CURRENT MEDICATIONS: WARFARIN      PROGRESS NOTES: gave dosage to pt, retest inr in 4 wks      PATIENT INSTRUCTIONS:     TEST LOCATION: [facility]     Electronically signed by: Tariq Montes on 8/18/2016 at 3:41 PM EDT

## 2018-01-17 NOTE — PROGRESS NOTES
REPORT NAME: Progress Notes Report  VISIT DATE: 4/7/2017  VISIT TIME: 1:12 PM EDT  PATIENT NAME: Cely Estrada RECORD NUMBER: [id number]  YOB: 1929  AGE: 80  REFERRING PHYSICIAN: Joanne Cornejo  SUPERVISING CLINICIAN: [address] PROVIDER: Karson Hunter   PATIENT HOME ADDRESS: [address] [place] [address] , [address], [address] [facility]  PATIENT HOME PHONE: [phone]  SOCIAL SECURITY NUMBER:   DIAGNOSIS 1: Unspecified atrial fibrillation / I48 91  DIAGNOSIS 2:   INR RANGE: 2 - 3  INR GOAL: 2 5  TREATMENT START DATE:   TREATMENT END DATE:   NEXT VISIT: 4/21/2017  [facility]  VISIT RESULTS  ENCOUNTER NUMBER:   TEST LOCATION: [facility]  TEST TYPE:   VISIT TYPE:   CURRENT INR: 3 33 PROTIME:   SPECIMEN COL AND RPT DATE: 4/7/2017 1:12 PM  EDT  VITAL SIGNS  PULSE:  BP: / WEIGHT:  HEIGHT:  TEMP:   CURRENT ANTICOAGULANT DOSING SCHEDULE  DOSE SIZE: 2 5mg    ANTICOAGULANT TYPE: WARFARIN  DOSING REGIMEN  Sun       Mon Tues Wed Thurs Fri       Sat  Total/Wk  2 5       2 5       2 5       2 5       5         2 5       2 5       20  PATIENT MEDICATION INSTRUCTION: Yes  PATIENT NUTRITIONAL COUNSELING: Yes  PATIENT BRUISING INSTRUCTION: Yes  LAST EDUCATION DATE:   PREVIOUS VISIT INFORMATION  VISITDATE  INRGoal INR   Sun    Mon Tues Wed Thurs  Fri    Sat  Total/wk  4/7/2017    2 5     3 33  2 5    2 5    2 5    2 5    5      2 5    2 5  20  3/24/2017   2 5     3 15  5      2 5    2 5    2 5    5      1 25   2 5  21 25  5      2 5    2 5    2 5    5      2 5    2 5  22 5  2/24/2017   2 5     2 14  5      2 5    2 5    2 5    5      2 5    2 5  22 5  2/7/2017    2 5     2 12  5      2 5    2 5    2 5    5      2 5    2 5  22 5  ADDITIONAL PREVIOUS VISIT INFORMATION  VISITDATE   PRIMARY RX               DOSE      CrCl  4/7/2017    WARFARIN                 2 5mg  3/24/2017   WARFARIN                 2 5mg  2/24/2017   WARFARIN 2 5mg  2/7/2017    WARFARIN                 2 5mg  OTHER CURRENT MEDICATIONS:  WARFARIN  PROGRESS NOTES: l/m w changes / 2 wks  PATIENT INSTRUCTIONS:   TEST LOCATION: [facility] [address], , ,   INBOUND LAB DATA:  Lab       Lab Value Col Date                 Rpt Date                 Lab  Reference Range  Electronically signed byConnor Aldridge  on 4/7/2017 1:12 PM EDT

## 2018-01-17 NOTE — PROGRESS NOTES
REPORT NAME: Patient Visit Summary Report   VISIT DATE: 1/10/2017  VISIT TIME: 12:01 PM EST  PATIENT NAME: Edgar Melgar [address] RECORD NUMBER: [id number]  SOCIAL SECURITY NUMBER:   YOB: 1929  AGE: 80  REFERRING PHYSICIAN: Joanne Cornejo  SUPERVISING CLINICIAN: [address] PROFESSIONAL: 1200 [facility]  PATIENT HOME ADDRESS: [address] [place] [address] , [facility] , Josy Braga, [address] [address]  PATIENT HOME PHONE: [phone]  DIAGNOSIS 1: Unspecified atrial fibrillation / I48 91  DIAGNOSIS 2:   DIAGNOSIS 3:   DIAGNOSIS 4:   INR RANGE: 2 - 3  INR GOAL: 2 5  TREATMENT START DATE:   TREATMENT END DATE:   NEXT VISIT: 2/21/2017  [facility]    VISIT RESULTS   ENCOUNTER NUMBER:   TEST LOCATION: [facility]  TEST TYPE:   VISIT TYPE:   CURRENT INR: 3 17 PROTIME:   SPECIMEN COL AND RPT DATE: 1/10/2017 12:01 PM  EST    VITAL SIGNS  PULSE:  B/P:  WEIGHT:  HEIGHT:  TEMP:     CURRENT ANTICOAGULANT DOSING SCHEDULE  DOSE SIZE: 2 5mg    ANTICOAGULANT TYPE: WARFARIN  DOSING REGIMEN  Sun       Mon Tues Wed Thurs Fri       Sat  Total/Wk  5         2 5       2 5       HOLD      5         2 5       2 5       20  5         2 5       2 5       2 5       5         2 5       2 5       22 5    PATIENT MEDICATION INSTRUCTION: No  PATIENT NUTRITIONAL COUNSELING: No  PATIENT BRUISING INSTRUCTION: No      LAST EDUCATION DATE:       PREVIOUS VISIT INFORMATION  VISITDATE   INR Goal  INR   Sun     Mon Tues Wed Thurs Fri  Sat     Total/wk  1/10/2017   2 5       3 17  5       2 5     2 5     HOLD    5       2 5  2 5     20  5       2 5     2 5     2 5     5       2 5  2 5     22 5  12/20/2016  2 5       3 06  5       2 5     2 5     2 5     5       2 5  2 5     22 5  11/28/2016  2 5       2 7   5       2 5     5       2 5     5       2 5  2 5     25  11/10/2016  2 5       1 87  5       2 5     5       2 5     5       2 5  2 5     25    ADDITIONAL PREVIOUS VISIT INFORMATION  VISITDATE   PRIMARY RX               DOSE      CrCl  1/10/2017   WARFARIN                 2 5mg               12/20/2016  WARFARIN                 2 5mg               11/28/2016  WARFARIN                 2 5mg               11/10/2016  WARFARIN                 2 5mg                     PROGRESS NOTES: l/m for pt, retest in 2 wks   ---- Additional Notes entered on 1/11/2017 1:03 PM EST by [id number]  :  pt was was taking mucinex and Flonase and antibiotic  ---- Additional Notes entered on 1/11/2017 1:26 PM EST by [facility] :  pt will take 2 5mg less tonight, retest in 4 wks, pt is finished with all  cold virus medications      PATIENT INSTRUCTIONS:     TEST LOCATION: [facility]    Electronically signed by: [facility] on 1/11/2017 at 1:26 PM EST

## 2018-01-17 NOTE — PROGRESS NOTES
Assessment  Assessed    1  Atrial fibrillation (427 31) (I48 91)   2  Mitral regurgitation (424 0) (I34 0)   3  Hypertension (401 9) (I10)    Plan  Allergic rhinitis    · Follow-up visit in 6 months Evaluation and Treatment  Follow-up  Status: Hold For -  Scheduling  Requested for: [id number]   Ordered; For: Allergic rhinitis; Ordered By: Adelia Mckeon Performed:  Due: 39VMS1492  Atrial fibrillation    · EKG/ECG- POC; Status:Complete;   Done: 33DFA1291   Perform: In Office; VKK:[id number]; Last Updated By:Rosa Nunes; 2/4/2016 1:14:06 PM;Ordered; For:Atrial fibrillation; Ordered By:Maribel Hernandez;    Discussion/Summary  Cardiology Discussion Summary Free Text Note Form [facility]:   No cardiovascular issues  Blood pressure controlled  Twelve-lead EKG shows atrial fibrillation with controlled ventricular response  She continues on warfarin  I've made no changes I will see her again in 6 months  Chief Complaint  Chief Complaint Free Text Note Form: Pt here for a 6 month f/u  Pt has no cardiac complaints today  History of Present Illness  Cardiology HPI Free Text Note Form [facility]: Recent spell is cessation in November for pneumonia  Also associated with hyponatremia  Hydrochlorothiazide was discontinued  She felt fatigued for a while but today she states she feels back to her baseline  She denies chest pain shortness of breath orthopnea paroxysmal nocturnal dyspnea or syncope      Review of Systems  Cardiology Female ROS:     Skin: No complaints of nonhealing sores or skin rash  Genitourinary: No complaints of recurrent urinary tract infections, frequent urination at night, difficult urination, blood in urine, kidney stones, loss of bladder control, kidney problems, denies any birth control or hormone replacement, is not post menopausal, not currently pregnant  Psychological: No complaints of feeling depressed, anxiety, panic attacks, or difficulty concentrating     General: No complaints of trouble sleeping, lack of energy, fatigue, appetite changes, weight changes, fever, frequent infections, or night sweats  Respiratory: No complaints of shortness of breath, cough with sputum, or wheezing  HEENT: No complaints of serious problems, hearing problems, nose problems, throat problems, or snoring  Gastrointestinal: No complaints of liver problems, nausea, vomiting, heartburn, constipation, bloody stools, diarrhea, problems swallowing, adbominal pain, or rectal bleeding  Hematologic: No complaints of bleeding disorders, anemia, blood clots, or excessive brusing  Neurological: No complaints of numbness, tingling, dizziness, weakness, seizures, headaches, syncope or fainting, AM fatigue, daytime sleepiness, no witnessed apnea episodes  Musculoskeletal: No complaints of arthritis, back pain, or painfull swelling  ROS Reviewed:   ROS reviewed  Active Problems  Problems    1  Allergic rhinitis (477 9) (J30 9)   2  Anxiety (300 00) (F41 9)   3  Atrial fibrillation (427 31) (I48 91)   4  Change in stool caliber (787 99) (R19 4)   5  Community acquired pneumonia (5) (J18 9)   6  Cough (786 2) (R05)   7  Decreased hearing (389 9) (H91 90)   8  Encounter for routine gynecological examination (V72 31) (Z01 419)   9  Encounter for screening mammogram for malignant neoplasm of breast (V76 12)   (Z12 31)   10  Encounter for screening mammogram for malignant neoplasm of breast (V76 12)    (Z12 31)   11  Esophageal reflux (530 81) (K21 9)   12  Glaucoma (365 9) (H40 9)   13  Hypertension (401 9) (I10)   14  Hyponatremia (276 1) (E87 1)   15  Immunization, pneumococcus and influenza (V06 6) (Z23)   16  Incomplete emptying of bladder (788 21) (R33 9)   17  Lipoma (214 9) (D17 9)   18  Lower back pain (724 2) (M54 5)   19  Mitral regurgitation (424 0) (I34 0)   20  Need for immunization against influenza (V04 81) (Z23)   21  Osteoporosis (733 00) (M81 0)   22   Sensorineural hearing loss of both ears (389 18) (H90 3)   23  Tenosynovitis of finger (727 05) (M65 9)   24  Urinary frequency (788 41) (R35 0)    Past Medical History  Problems    1  History of Abdominal pain, LLQ (left lower quadrant) (789 04) (R10 32)   2  History of Acute upper respiratory infection (465 9) (J06 9)   3  History of Arthritis (V13 4)   4  History of Birth History   5  History of Edema of left lower extremity (782 3) (R60 0)   6  History of Gastroenteritis (558 9) (K52 9)   7  History of abdominal pain (V13 89) (Z87 898)   8  History of common cold (V12 09) (Z86 19)   9  History of dermatitis (V13 3) (Z87 2)   10  History of fever (V13 89) (Z87 898)   11  History of hypertension (V12 59) (Z86 79)   12  History of osteopenia (V13 59) (Z87 39)   13  History of shortness of breath (V13 89) (Z87 898)   14  History of shortness of breath (V13 89) (Z87 898)   15  History of urinary tract infection (V13 02) (Z87 440)   16  History of Left leg cellulitis (682 6) (L03 116)   17  History of Periodontal abscess (523 31) (K05 21)   18  History of Rash (782 1) (R21)   19  History of Reported A Previous Heart Murmur   20  History of Reported Pap Smear  Active Problems And Past Medical History Reviewed: The active problems and past medical history were reviewed and updated today  Surgical History  Problems    1  History of Cataract Surgery   2  History of Hemorrhoidectomy   3  History of Inguinal Hernia Repair   4  History of Total Abdominal Hysterectomy  Surgical History Reviewed: The surgical history was reviewed and updated today  Family History  Mother    1  Family history of Colon Cancer (V16 0)  Father    2  Family history of Acute Myocardial Infarction (V17 3)  Daughter    3  Family history of multiple sclerosis (V17 2) (Z82 0)  Family History Reviewed: The family history was reviewed and updated today         Social History  Problems    · Denied: History of Alcohol Use (History)   · Former smoker (P68 95) (O87 168)   · Denied: History of Home Environment Domestic Violence   · Denied: History of Marital History - Currently    · Retired From Work   ·   Social History Reviewed: The social history was reviewed and updated today  The social history was reviewed and is unchanged  Current Meds   1  Calcium 600-200 MG-UNIT Oral Tablet; Therapy: 79OGC4512 to Recorded   2  CloNIDine HCl - 0 1 MG Oral Tablet; Take one tablet daily; Therapy: (Recorded:70Kfu9434) to Recorded   3  Diltiazem HCl ER Coated Beads 240 MG Oral Capsule Extended Release 24 Hour;   TAKE 1 CAPSULE DAILY; Therapy: 51JCC2933 to (Pham Cuff)  Requested for: 95NHY9005; Last   Rx:09Nov2015 Ordered   4  Fluticasone Propionate 50 MCG/ACT Nasal Suspension; USE 1 SPRAY IN EACH   NOSTRIL TWICE DAILY; Therapy: 29Dnq2056 to (06026 22 77 32)  Requested for: 79DNY4641; Last   Rx:26Jun2014 Ordered   5  Metoprolol Tartrate 25 MG Oral Tablet; take 1 tablet twice a day; Therapy: 68XYE2088 to (Pham Cuff)  Requested for: 73FOT0793; Last   Rx:09Nov2015 Ordered   6  Multi Vitamin/Minerals Oral Tablet; ONCE DAILY; Therapy: 36MCR7184 to (Last Shade Cocker)  Requested for: 21Jun2011 Ordered   7  NexIUM 40 MG Oral Capsule Delayed Release; TAKE 1 CAPSULE DAILY; Therapy: (Recorded:87Qkd4245) to Recorded   8  ProAir  (90 Base) MCG/ACT Inhalation Aerosol Solution; INHALE TWO PUFFS   EVERY 4 TO 6 HOURS AS NEEDED; Therapy: 84GLH5015 to (Evaluate:24Mar2016)  Requested for: 03ZBB4178; Last   Rx:42Erx2829 Ordered   9  Timolol Maleate 0 5 % Ophthalmic Gel Forming Solution; Therapy: 22NSZ5009 to Recorded   10  TraMADol HCl - 50 MG Oral Tablet; TAKE 1 TABLET 3 TIMES DAILY; Therapy: (Recorded:45Ayl0763) to Recorded   11  Tylenol 325 MG Oral Tablet; Therapy: 50WEA5729 to Recorded   12  Warfarin Sodium 2 5 MG Oral Tablet; Take 1 to 2 tablets daily by mouth or as directed by    physician;     Therapy: 66VJG7921 to (Evaluate:23Ctt7300)  Requested for: 77TQB2426; Last    Rx:54Anu5762; Status: ACTIVE - Retrospective Authorization Ordered  Medication List Reviewed: The medication list was reviewed and updated today  Allergies  Medication    1  Aspirin TABS   2  Codeine Derivatives   3  Erythromycin TABS   4  Macrolides   5  Penicillins   6  Salicylates   7  Sulfa Drugs    Vitals  Vital Signs [Data Includes: Current Encounter]    Recorded: 27ARB7351 01:13PM   Heart Rate 87   Systolic 786, RUE, Sitting   Diastolic 64, RUE, Sitting   Height 5 ft 4 in   Weight 201 lb 5 oz   BMI Calculated 34 56   BSA Calculated 1 96     Physical Exam    Constitutional   General appearance: No acute distress, well appearing and well nourished  Eyes   Conjunctiva and Sclera examination: Conjunctiva pink, sclera anicteric  Ears, Nose, Mouth, and Throat - Oropharynx: Clear, nares are clear, mucous membranes are moist    Neck   Neck and thyroid: Normal, supple, trachea midline, no thyromegaly  Pulmonary   Auscultation of lungs: Clear to auscultation, no rales, no rhonchi, no wheezing, good air movement  Cardiovascular   Auscultation of heart: Abnormal   The rhythm was irregularly irregular  Carotid pulses: Normal, 2+ bilaterally  Peripheral vascular exam: Normal pulses throughout, no tenderness, erythema or swelling  Examination of extremities for edema and/or varicosities: Normal     Abdomen   Abdomen: Non-tender and no distention  Musculoskeletal Gait and station: Normal gait  Skin - Skin and subcutaneous tissue: Normal without rashes or lesions  Skin is warm and well perfused, normal turgor  Neurologic - Speech: Normal     Psychiatric - Orientation to person, place, and time: Normal  Mood and affect: Normal       Health Management  Health Maintenance   * Dexa Scan Axial Skel (Hip, Pelvis, Spine); every 2 years; Next Due: 37VKJ7328; Overdue  Medicare Annual Wellness Visit; every 1 year; Next Due: O816097;  Overdue    Future Appointments    Date/Time Provider Specialty Site   04/04/2016 02:30 PM YORDY Zuniga   Obstetrics/Gynecology [facility]   07/06/2016 01:30 PM Guillermo Mirza DO Internal Medicine  [address]     Signatures   Electronically signed by : YORDY Lawson ; Feb 4 2016  3:56PM EST                       (Author)

## 2018-01-18 NOTE — PROGRESS NOTES
REPORT NAME: Progress Notes Report  VISIT DATE: 5/19/2017  VISIT TIME: 2:44 PM EDT  PATIENT NAME: Josias Estrada RECORD NUMBER: [id number]  YOB: 1929  AGE: 80  REFERRING PHYSICIAN: Joanne Cornejo  SUPERVISING CLINICIAN: [address] PROVIDER: Jackie Humphreys  PATIENT HOME ADDRESS: [address] [place] [address] , [place] [address] , [address] [facility], [address] [facility]  PATIENT HOME PHONE: [phone]  SOCIAL SECURITY NUMBER:   DIAGNOSIS 1: Unspecified atrial fibrillation / I48 91  DIAGNOSIS 2:   INR RANGE: 2 - 3  INR GOAL: 2 5  TREATMENT START DATE:   TREATMENT END DATE:   NEXT VISIT: 5/26/2017  [facility]  VISIT RESULTS  ENCOUNTER NUMBER:   TEST LOCATION: [facility]  TEST TYPE:   VISIT TYPE:   CURRENT INR: 1 7 PROTIME:   SPECIMEN COL AND RPT DATE: 5/19/2017 2:44 PM  EDT  VITAL SIGNS  PULSE:  BP: / WEIGHT:  HEIGHT:  TEMP:   CURRENT ANTICOAGULANT DOSING SCHEDULE  DOSE SIZE: 2 5mg    ANTICOAGULANT TYPE: WARFARIN  DOSING REGIMEN  Sun       Mon Tues Wed Thurs Fri       Sat  Total/Wk  5         2 5       2 5       2 5       5         5         2 5       25  PATIENT MEDICATION INSTRUCTION: Yes  PATIENT NUTRITIONAL COUNSELING: No  PATIENT BRUISING INSTRUCTION: No  LAST EDUCATION DATE:   PREVIOUS VISIT INFORMATION  VISITDATE  INRGoal INR   Sun    Mon Tues Wed Thurs  Fri    Sat  Total/wk  5/19/2017   2 5     1 7   5      2 5    2 5    2 5    5      5      2 5  25  5/10/2017   2 5     2 64  5      2 5    2 5    2 5    2 5    2 5    2 5  20  5      2 5    2 5    2 5    5      2 5    2 5  22 5  4/19/2017   2 5     1 89  5      2 5    2 5    2 5    5      2 5    2 5  22 5  4/7/2017    2 5     3 33  2 5    2 5    2 5    2 5    5      2 5    2 5  20  ADDITIONAL PREVIOUS VISIT INFORMATION  VISITDATE   PRIMARY RX               DOSE      CrCl  5/19/2017   WARFARIN                 2 5mg  5/10/2017   WARFARIN                 2 5mg  4/19/2017   WARFARIN 2 5mg  4/7/2017    WARFARIN                 2 5mg  OTHER CURRENT MEDICATIONS:  WARFARIN  PROGRESS NOTES: gave dosage to pt, retest inr in one week due to  cellulitis, pt still on antibiotics    PATIENT INSTRUCTIONS:   TEST LOCATION: [place] [address], , ,   INBOUND LAB DATA:  Lab       Lab Value Col Date                 Rpt Date                 Lab  Reference Range  Electronically signed byCarmen Recinos on 5/19/2017 2:44 PM EDT

## 2018-01-18 NOTE — PROGRESS NOTES
REPORT NAME: Progress Notes Report  VISIT DATE: 3/24/2017  VISIT TIME: 2:09 PM EDT  PATIENT NAME: Michell Estrada RECORD NUMBER: [id number]  YOB: 1929  AGE: 80  REFERRING PHYSICIAN: Joanne Cornejo  SUPERVISING CLINICIAN: [address] PROVIDER: Abraham Rowley  PATIENT HOME ADDRESS: [address] [place] [address] , [address] [facility], [facility], [address]  PATIENT HOME PHONE: [phone]  SOCIAL SECURITY NUMBER:   DIAGNOSIS 1: Unspecified atrial fibrillation / I48 91  DIAGNOSIS 2:   INR RANGE: 2 - 3  INR GOAL: 2 5  TREATMENT START DATE:   TREATMENT END DATE:   NEXT VISIT: 4/7/2017  [facility]  VISIT RESULTS  ENCOUNTER NUMBER:   TEST LOCATION: [facility]  TEST TYPE:   VISIT TYPE:   CURRENT INR: 3 15 PROTIME:   SPECIMEN COL AND RPT DATE: 3/24/2017 2:09 PM  EDT  VITAL SIGNS  PULSE:  BP: / WEIGHT:  HEIGHT:  TEMP:   CURRENT ANTICOAGULANT DOSING SCHEDULE  DOSE SIZE: 2 5mg    ANTICOAGULANT TYPE: WARFARIN  DOSING REGIMEN  Sun       Mon Tues Wed Thurs Fri       Sat  Total/Wk  5         2 5       2 5       2 5       5         1 25      2 5       21 25  5         2 5       2 5       2 5       5         2 5       2 5       22 5  PATIENT MEDICATION INSTRUCTION: Yes  PATIENT NUTRITIONAL COUNSELING: No  PATIENT BRUISING INSTRUCTION: No  LAST EDUCATION DATE:   PREVIOUS VISIT INFORMATION  VISITDATE  INRGoal INR   Sun    Mon    Tues Wed Thurs  Fri    Sat  Total/wk  3/24/2017   2 5     3 15  5      2 5    2 5    2 5    5      1 25   2 5  21 25  5      2 5    2 5    2 5    5      2 5    2 5  22 5  2/24/2017   2 5     2 14  5      2 5    2 5    2 5    5      2 5    2 5  22 5  2/7/2017    2 5     2 12  5      2 5    2 5    2 5    5      2 5    2 5  22 5  1/10/2017   2 5     3 17  5      2 5    2 5    HOLD   5      2 5    2 5  20  5      2 5    2 5    2 5    5      2 5    2 5  22 5  ADDITIONAL PREVIOUS VISIT INFORMATION  VISITDATE   PRIMARY RX               DOSE CrCl  3/24/2017   WARFARIN                 2 5mg  2/24/2017   WARFARIN                 2 5mg  2/7/2017    WARFARIN                 2 5mg  1/10/2017   WARFARIN                 2 5mg  OTHER CURRENT MEDICATIONS:  WARFARIN  PROGRESS NOTES: gave dosage to pt  pt had an allergic reaction to Percocet  pt to take less warfarin tonight, retest inr in 2 wks    PATIENT INSTRUCTIONS:   TEST LOCATION: [place] [address], , ,   INBOUND LAB DATA:  Lab       Lab Value Col Date                 Rpt Date                 Lab  Reference Range  Electronically signed byTiffany Diaz on 3/24/2017 2:09 PM EDT

## 2018-01-20 ENCOUNTER — ANTICOAG VISIT (OUTPATIENT)
Dept: NON INVASIVE DIAGNOSTICS | Facility: HOSPITAL | Age: 83
End: 2018-01-20

## 2018-01-20 DIAGNOSIS — I48.91 ATRIAL FIBRILLATION, UNSPECIFIED TYPE (HCC): ICD-10-CM

## 2018-02-01 ENCOUNTER — ANTICOAG VISIT (OUTPATIENT)
Dept: CARDIOLOGY CLINIC | Facility: CLINIC | Age: 83
End: 2018-02-01

## 2018-02-01 ENCOUNTER — APPOINTMENT (OUTPATIENT)
Dept: LAB | Facility: HOSPITAL | Age: 83
End: 2018-02-01
Attending: INTERNAL MEDICINE
Payer: MEDICARE

## 2018-02-01 DIAGNOSIS — I48.91 ATRIAL FIBRILLATION, UNSPECIFIED TYPE (HCC): ICD-10-CM

## 2018-02-01 DIAGNOSIS — I48.91 ATRIAL FIBRILLATION (HCC): ICD-10-CM

## 2018-02-01 LAB
INR PPP: 2.49 (ref 0.86–1.16)
PROTHROMBIN TIME: 27.2 SECONDS (ref 12.1–14.4)

## 2018-02-01 PROCEDURE — 85610 PROTHROMBIN TIME: CPT

## 2018-02-01 PROCEDURE — 36415 COLL VENOUS BLD VENIPUNCTURE: CPT

## 2018-02-21 RX ORDER — ALBUTEROL SULFATE 90 UG/1
2 AEROSOL, METERED RESPIRATORY (INHALATION)
COMMUNITY
Start: 2012-06-05 | End: 2019-05-03 | Stop reason: SDUPTHER

## 2018-02-21 RX ORDER — ESOMEPRAZOLE MAGNESIUM 40 MG/1
1 CAPSULE, DELAYED RELEASE ORAL DAILY
COMMUNITY

## 2018-02-21 RX ORDER — CALCIUM CITRATE 1040MG
TABLET ORAL
COMMUNITY

## 2018-02-21 RX ORDER — ERGOCALCIFEROL 1.25 MG/1
CAPSULE ORAL WEEKLY
COMMUNITY

## 2018-02-21 RX ORDER — DILTIAZEM HYDROCHLORIDE 240 MG/1
1 CAPSULE, COATED, EXTENDED RELEASE ORAL DAILY
COMMUNITY
Start: 2011-01-17 | End: 2018-05-03 | Stop reason: SDUPTHER

## 2018-02-22 ENCOUNTER — OFFICE VISIT (OUTPATIENT)
Dept: CARDIOLOGY CLINIC | Facility: CLINIC | Age: 83
End: 2018-02-22
Payer: MEDICARE

## 2018-02-22 VITALS
WEIGHT: 167.2 LBS | OXYGEN SATURATION: 82 % | HEART RATE: 73 BPM | HEIGHT: 64 IN | DIASTOLIC BLOOD PRESSURE: 80 MMHG | SYSTOLIC BLOOD PRESSURE: 126 MMHG | BODY MASS INDEX: 28.54 KG/M2

## 2018-02-22 DIAGNOSIS — I34.0 NON-RHEUMATIC MITRAL REGURGITATION: ICD-10-CM

## 2018-02-22 DIAGNOSIS — I10 ESSENTIAL HYPERTENSION: ICD-10-CM

## 2018-02-22 DIAGNOSIS — I36.1 NON-RHEUMATIC TRICUSPID VALVE INSUFFICIENCY: ICD-10-CM

## 2018-02-22 DIAGNOSIS — I48.21 PERMANENT ATRIAL FIBRILLATION (HCC): Primary | ICD-10-CM

## 2018-02-22 PROCEDURE — 99214 OFFICE O/P EST MOD 30 MIN: CPT | Performed by: INTERNAL MEDICINE

## 2018-02-22 NOTE — PROGRESS NOTES
Cardiology Follow Up    Joshua Hernandez  12/5/1929  [id number]  [address] [facility]  [address] [place] [address] [address]    1  Permanent atrial fibrillation ([address] )     2  Non-rheumatic mitral regurgitation  Echo complete with contrast if indicated   3  Non-rheumatic tricuspid valve insufficiency     4  Essential hypertension         Interval History: No complaints today  Tolerates all activity  Denies chest pain shortness of breath orthopnea paroxysmal nocturnal dyspnea or syncope  Patient Active Problem List   Diagnosis    Atrial fibrillation ([facility] ) [I48 91]     Past Medical History:   Diagnosis Date    A-fib ([facility] [address] )     Anxiety     Cellulitis     L arm    Hypertension     Hypokalemia     Hyponatremia     Lipoma     Lower back pain     Mitral regurgitation     Tricuspid regurgitation      Social History     Social History    Marital status:      Spouse name: N/A    Number of children: N/A    Years of education: N/A     Occupational History    Not on file  Social History Main Topics    Smoking status: Former Smoker     Quit date: 1940    Smokeless tobacco: Never Used    Alcohol use No    Drug use: No    Sexual activity: Not on file     Other Topics Concern    Not on file     Social History Narrative    No narrative on file      History reviewed  No pertinent family history    Past Surgical History:   Procedure Laterality Date    BACK SURGERY      BREAST SURGERY      R breast    EYE SURGERY      HERNIA REPAIR      HYSTERECTOMY         Current Outpatient Prescriptions:     acetaminophen (TYLENOL) 325 mg tablet, Take 325 mg by mouth every 6 (six) hours as needed for mild pain, Disp: , Rfl:     albuterol (PROAIR HFA) 90 mcg/act inhaler, Inhale 2 puffs, Disp: , Rfl:     albuterol (PROVENTIL HFA,VENTOLIN HFA) 90 mcg/act inhaler, Inhale 2 puffs every 4 (four) hours as needed for wheezing, Disp: , Rfl:     calcium citrate (CALCITRATE) 950 MG tablet, Take 1 tablet by mouth daily, Disp: , Rfl:     Calcium Citrate 1040 MG TABS, Take by mouth, Disp: , Rfl:     clindamycin (CLEOCIN) 300 MG capsule, Take 300 mg by mouth 4 (four) times a day, Disp: , Rfl:     cloNIDine (CATAPRES) 0 1 mg tablet, Take 0 1 mg by mouth 2 (two) times a day, Disp: , Rfl:     denosumab (PROLIA) 60 mg/mL, Inject 60 mg under the skin once, Disp: , Rfl:     diltiazem (CARDIZEM CD) 240 mg 24 hr capsule, Take 1 capsule by mouth daily, Disp: , Rfl:     diltiazem (TIAZAC) 240 MG 24 hr capsule, Take 240 mg by mouth daily, Disp: , Rfl:     esomeprazole (NEXIUM) 40 MG capsule, Take 1 capsule by mouth daily, Disp: , Rfl:     fluticasone (FLONASE) 50 mcg/act nasal spray, 2 sprays into each nostril daily, Disp: , Rfl:     furosemide (LASIX) 40 mg tablet, Take 40 mg by mouth 2 (two) times a day, Disp: , Rfl:     losartan (COZAAR) 50 mg tablet, Take 50 mg by mouth daily, Disp: , Rfl:     metoprolol tartrate (LOPRESSOR) 25 mg tablet, Take 25 mg by mouth 2 (two) times a day, Disp: , Rfl:     Multiple Vitamin (MULTIVITAMIN) tablet, Take 1 tablet by mouth daily, Disp: , Rfl:     timolol (TIMOPTIC-XE) 0 5 % ophthalmic gel-forming, 1 drop daily, Disp: , Rfl:     warfarin (COUMADIN) 2 5 mg tablet, Take 5 mg by mouth daily  , Disp: , Rfl:     warfarin (COUMADIN) 2 5 mg tablet, Take 2 5 mg by mouth daily, Disp: , Rfl:     cholecalciferol (VITAMIN D3) 1,000 units tablet, Take 50,000 Units by mouth daily, Disp: , Rfl:     ergocalciferol (VITAMIN D2) 50,000 units, Take by mouth, Disp: , Rfl:     esomeprazole (NexIUM) 40 mg packet, Take 40 mg by mouth every morning before breakfast, Disp: , Rfl:     Multiple Vitamins-Minerals (MULTI-VITAMIN/MINERALS PO), Take by mouth daily, Disp: , Rfl:   Allergies   Allergen Reactions    Erythromycin Rash and Shortness Of Breath    Nitrofurantoin Anaphylaxis    Penicillins Rash and Shortness Of Breath    Salicylates Hives and Shortness Of Breath    Codeine GI Intolerance    Percolone [Oxycodone] GI Intolerance    Sulfa Antibiotics Rash       Labs:  Appointment on 02/01/2018   Component Date Value    Protime 02/01/2018 27 2*    INR 02/01/2018 2 49*   Appointment on 01/10/2018   Component Date Value    Protime 01/10/2018 32 5*    INR 01/10/2018 3 11*   Appointment on 12/07/2017   Component Date Value    WBC 12/07/2017 9 13     RBC 12/07/2017 4 31     Hemoglobin 12/07/2017 13 9     Hematocrit 12/07/2017 41 1     MCV 12/07/2017 95     MCH 12/07/2017 32 3     MCHC 12/07/2017 33 8     RDW 12/07/2017 13 8     Platelets 07/82/0335 345     MPV 12/07/2017 9 6     Protime 12/07/2017 29 4*    INR 12/07/2017 2 74*   Appointment on 11/08/2017   Component Date Value    Sodium 11/08/2017 137     Potassium 11/08/2017 4 1     Chloride 11/08/2017 104     CO2 11/08/2017 28     Anion Gap 11/08/2017 5     BUN 11/08/2017 21     Creatinine 11/08/2017 0 73     Glucose, Fasting 11/08/2017 90     Calcium 11/08/2017 8 2*    AST 11/08/2017 10     ALT 11/08/2017 15     Alkaline Phosphatase 11/08/2017 87     Total Protein 11/08/2017 7 0     Albumin 11/08/2017 3 3*    Total Bilirubin 11/08/2017 0 76     eGFR 11/08/2017 74     Cholesterol 11/08/2017 141     Triglycerides 11/08/2017 75     HDL, Direct 11/08/2017 51     LDL Calculated 11/08/2017 75     WBC 11/08/2017 14 02*    RBC 11/08/2017 4 26     Hemoglobin 11/08/2017 13 7     Hematocrit 11/08/2017 40 8     MCV 11/08/2017 96     MCH 11/08/2017 32 2     MCHC 11/08/2017 33 6     RDW 11/08/2017 13 2     Platelets 49/10/6897 324     MPV 11/08/2017 9 9     Protime 11/08/2017 26 5*    INR 11/08/2017 2 41*   Appointment on 10/19/2017   Component Date Value    Protime 10/19/2017 30 4*    INR 10/19/2017 2 86*   Appointment on 09/15/2017   Component Date Value    Protime 09/15/2017 26 3*    INR 09/15/2017 2 38*     Imaging: No results found      Review of Systems:  Review of Systems   Constitutional: Negative for fatigue  HENT: Negative for hearing loss  Eyes: Negative for discharge  Respiratory: Negative for shortness of breath  Cardiovascular: Negative for chest pain, palpitations and leg swelling  Gastrointestinal: Negative for abdominal pain  Endocrine: Negative for polyuria  Genitourinary: Negative for dysuria  Musculoskeletal: Negative for back pain and myalgias  Skin: Negative for rash  Neurological: Negative for syncope  Hematological: Does not bruise/bleed easily  Psychiatric/Behavioral: Negative for confusion and sleep disturbance  Physical Exam:  Physical Exam   Constitutional: She is oriented to person, place, and time  She appears well-developed and well-nourished  HENT:   Head: Normocephalic and atraumatic  Mouth/Throat: Oropharynx is clear and moist    Eyes: EOM are normal    Neck: Normal range of motion  Neck supple  No JVD present  Cardiovascular: Normal heart sounds  irreg   Pulmonary/Chest: Effort normal and breath sounds normal    Abdominal: Soft  Bowel sounds are normal    Musculoskeletal: Normal range of motion  She exhibits no edema  Neurological: She is alert and oriented to person, place, and time  She has normal reflexes  Skin: Skin is warm and dry  Psychiatric: She has a normal mood and affect  Her behavior is normal  Judgment and thought content normal    Nursing note and vitals reviewed  Discussion/Summary:  12 lead EKG today shows atrial fibrillation with controlled ventricular response  She continues on anticoagulation  Blood pressure is controlled  The lower extremity edema is no longer an issue  She has known mitral and tricuspid regurgitation  This July will be 2 years since her previous echocardiogram   I will reassess to see if there has been any progression and recheck her left ventricular systolic function at that time

## 2018-02-26 DIAGNOSIS — I10 BENIGN ESSENTIAL HYPERTENSION: ICD-10-CM

## 2018-02-26 DIAGNOSIS — I10 BENIGN ESSENTIAL HYPERTENSION: Primary | ICD-10-CM

## 2018-02-26 RX ORDER — CLONIDINE HYDROCHLORIDE 0.1 MG/1
TABLET ORAL
Qty: 30 TABLET | Refills: 5 | Status: SHIPPED | OUTPATIENT
Start: 2018-02-26 | End: 2018-02-27 | Stop reason: SDUPTHER

## 2018-02-26 RX ORDER — CLONIDINE HYDROCHLORIDE 0.1 MG/1
TABLET ORAL
Qty: 30 TABLET | Refills: 5 | Status: SHIPPED | OUTPATIENT
Start: 2018-02-26 | End: 2018-02-26 | Stop reason: SDUPTHER

## 2018-02-27 DIAGNOSIS — I10 BENIGN ESSENTIAL HYPERTENSION: ICD-10-CM

## 2018-02-27 RX ORDER — CLONIDINE HYDROCHLORIDE 0.1 MG/1
TABLET ORAL
Qty: 30 TABLET | Refills: 5 | Status: SHIPPED | OUTPATIENT
Start: 2018-02-27 | End: 2018-08-29 | Stop reason: SDUPTHER

## 2018-03-01 ENCOUNTER — ANTICOAG VISIT (OUTPATIENT)
Dept: CARDIOLOGY CLINIC | Facility: CLINIC | Age: 83
End: 2018-03-01

## 2018-03-01 ENCOUNTER — APPOINTMENT (OUTPATIENT)
Dept: LAB | Facility: HOSPITAL | Age: 83
End: 2018-03-01
Attending: INTERNAL MEDICINE
Payer: MEDICARE

## 2018-03-01 DIAGNOSIS — I48.91 ATRIAL FIBRILLATION (HCC): ICD-10-CM

## 2018-03-01 DIAGNOSIS — I48.91 ATRIAL FIBRILLATION, UNSPECIFIED TYPE (HCC): ICD-10-CM

## 2018-03-01 LAB
INR PPP: 2.15 (ref 0.86–1.16)
PROTHROMBIN TIME: 24.2 SECONDS (ref 12.1–14.4)

## 2018-03-01 PROCEDURE — 85610 PROTHROMBIN TIME: CPT

## 2018-03-01 PROCEDURE — 36415 COLL VENOUS BLD VENIPUNCTURE: CPT

## 2018-03-12 ENCOUNTER — HOSPITAL ENCOUNTER (OUTPATIENT)
Dept: NON INVASIVE DIAGNOSTICS | Facility: CLINIC | Age: 83
Discharge: HOME | End: 2018-03-12
Payer: MEDICARE

## 2018-03-12 DIAGNOSIS — I34.0 NON-RHEUMATIC MITRAL REGURGITATION: ICD-10-CM

## 2018-03-12 PROCEDURE — 93306 TTE W/DOPPLER COMPLETE: CPT

## 2018-03-12 PROCEDURE — 93306 TTE W/DOPPLER COMPLETE: CPT | Performed by: INTERNAL MEDICINE

## 2018-03-29 ENCOUNTER — ANTICOAG VISIT (OUTPATIENT)
Dept: CARDIOLOGY CLINIC | Facility: CLINIC | Age: 83
End: 2018-03-29

## 2018-03-29 ENCOUNTER — LAB (OUTPATIENT)
Dept: LAB | Facility: HOSPITAL | Age: 83
End: 2018-03-29
Attending: INTERNAL MEDICINE
Payer: MEDICARE

## 2018-03-29 DIAGNOSIS — I48.91 ATRIAL FIBRILLATION, UNSPECIFIED TYPE (HCC): ICD-10-CM

## 2018-03-29 DIAGNOSIS — I48.91 ATRIAL FIBRILLATION (HCC): ICD-10-CM

## 2018-03-29 LAB
INR PPP: 2.49 (ref 0.86–1.16)
PROTHROMBIN TIME: 27.2 SECONDS (ref 12.1–14.4)

## 2018-03-29 PROCEDURE — 85610 PROTHROMBIN TIME: CPT

## 2018-03-29 PROCEDURE — 36415 COLL VENOUS BLD VENIPUNCTURE: CPT

## 2018-04-14 ENCOUNTER — APPOINTMENT (EMERGENCY)
Dept: RADIOLOGY | Facility: HOSPITAL | Age: 83
End: 2018-04-14
Payer: MEDICARE

## 2018-04-14 ENCOUNTER — HOSPITAL ENCOUNTER (EMERGENCY)
Facility: HOSPITAL | Age: 83
Discharge: HOME | End: 2018-04-14
Payer: MEDICARE

## 2018-04-14 VITALS
HEART RATE: 95 BPM | SYSTOLIC BLOOD PRESSURE: 167 MMHG | TEMPERATURE: 98 F | OXYGEN SATURATION: 98 % | DIASTOLIC BLOOD PRESSURE: 97 MMHG | RESPIRATION RATE: 18 BRPM

## 2018-04-14 DIAGNOSIS — R22.0 SWELLING OF UPPER LIP: ICD-10-CM
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
## 2018-04-14 DIAGNOSIS — T07.XXXA MULTIPLE ABRASIONS: ICD-10-CM

## 2018-04-14 LAB
INR PPP: 2.64 (ref 0.86–1.16)
PROTHROMBIN TIME: 28.5 SECONDS (ref 12.1–14.4)

## 2018-04-14 PROCEDURE — 70486 CT MAXILLOFACIAL W/O DYE: CPT

## 2018-04-14 PROCEDURE — 99284 EMERGENCY DEPT VISIT MOD MDM: CPT

## 2018-04-14 PROCEDURE — 72125 CT NECK SPINE W/O DYE: CPT

## 2018-04-14 PROCEDURE — 90471 IMMUNIZATION ADMIN: CPT

## 2018-04-14 PROCEDURE — 90715 TDAP VACCINE 7 YRS/> IM: CPT | Performed by: EMERGENCY MEDICINE

## 2018-04-14 PROCEDURE — 85610 PROTHROMBIN TIME: CPT | Performed by: EMERGENCY MEDICINE

## 2018-04-14 PROCEDURE — 71045 X-RAY EXAM CHEST 1 VIEW: CPT

## 2018-04-14 PROCEDURE — 70450 CT HEAD/BRAIN W/O DYE: CPT

## 2018-04-14 PROCEDURE — 36415 COLL VENOUS BLD VENIPUNCTURE: CPT | Performed by: EMERGENCY MEDICINE

## 2018-04-14 RX ADMIN — TETANUS TOXOID, REDUCED DIPHTHERIA TOXOID AND ACELLULAR PERTUSSIS VACCINE, ADSORBED 0.5 ML: 5; 2.5; 8; 8; 2.5 SUSPENSION INTRAMUSCULAR at 20:08

## 2018-04-14 NOTE — ED ATTENDING ATTESTATION
Keny Chino DO, saw and evaluated the patient  I have discussed the patient with the resident/non-physician practitioner and agree with the resident's/non-physician practitioner's findings, Plan of Care, and MDM as documented in the resident's/non-physician practitioner's note, except where noted  All available labs and Radiology studies were reviewed  At this point I agree with the current assessment done in the Emergency Department  I have conducted an independent evaluation of this patient a history and physical is as follows:    Patient with mechanical fall earlier prior to arrival   She does complain of facial pain, and has apparent abrasions from the fall  She is neurologically intact, and is appropriate  Present time plan is to obtain CT scan of the head neck and facial bones  Will obtain an INR as well to validate her anticoagulation status      Critical Care Time  CritCare Time    Procedures

## 2018-04-14 NOTE — ED PROVIDER NOTES
History  Chief Complaint   Patient presents with    Fall     per ems pt tripped on the curb and fell onto her face  pt is on coumadin  pt denies loc or cervical tenderness     This is an 75-year-old female with a history of AFib on Coumadin, hypertension who presents after mechanical fall  [place] arrange herself with her walker when she lost her footing and fell  She struck her face on the concrete  Denies loss of consciousness  The fall was witnessed and she was immediately attended to by bystanders  She was brought to the emergency department for evaluation  The patient is not up-to-date on her tetanus  Currently only complains of facial pain  Prior to Admission Medications   Prescriptions Last Dose Informant Patient Reported? Taking?    Calcium Citrate 1040 MG TABS  Self Yes Yes   Sig: Take by mouth   Multiple Vitamin (MULTIVITAMIN) tablet  Self Yes Yes   Sig: Take 1 tablet by mouth daily   Multiple Vitamins-Minerals (MULTI-VITAMIN/MINERALS PO)  Self Yes Yes   Sig: Take by mouth daily   acetaminophen (TYLENOL) 325 mg tablet  Self Yes Yes   Sig: Take 325 mg by mouth every 6 (six) hours as needed for mild pain   albuterol (PROAIR HFA) 90 mcg/act inhaler  Self Yes Yes   Sig: Inhale 2 puffs   albuterol (PROVENTIL HFA,VENTOLIN HFA) 90 mcg/act inhaler  Self Yes Yes   Sig: Inhale 2 puffs every 4 (four) hours as needed for wheezing   calcium citrate (CALCITRATE) 950 MG tablet  Self Yes Yes   Sig: Take 1 tablet by mouth daily   cholecalciferol (VITAMIN D3) 1,000 units tablet  Self Yes Yes   Sig: Take 50,000 Units by mouth daily   clindamycin (CLEOCIN) 300 MG capsule  Self Yes Yes   Sig: Take 300 mg by mouth 4 (four) times a day   cloNIDine (CATAPRES) 0 1 mg tablet   No Yes   Sig: TAKE ONE TABLET DAILY   denosumab (PROLIA) 60 mg/mL   Yes Yes   Sig: Inject 60 mg under the skin once   diltiazem (CARDIZEM CD) 240 mg 24 hr capsule  Self Yes Yes   Sig: Take 1 capsule by mouth daily   diltiazem (TIAZAC) 240 MG 24 hr capsule  Self Yes Yes   Sig: Take 240 mg by mouth daily   ergocalciferol (VITAMIN D2) 50,000 units  Self Yes Yes   Sig: Take by mouth   esomeprazole (NEXIUM) 40 MG capsule  Self Yes Yes   Sig: Take 1 capsule by mouth daily   esomeprazole (NexIUM) 40 mg packet  Self Yes Yes   Sig: Take 40 mg by mouth every morning before breakfast   fluticasone (FLONASE) 50 mcg/act nasal spray  Self Yes Yes   Si sprays into each nostril daily   furosemide (LASIX) 40 mg tablet  Self Yes Yes   Sig: Take 40 mg by mouth 2 (two) times a day   losartan (COZAAR) 50 mg tablet  Self Yes Yes   Sig: Take 50 mg by mouth daily   metoprolol tartrate (LOPRESSOR) 25 mg tablet  Self Yes Yes   Sig: Take 25 mg by mouth 2 (two) times a day   timolol (TIMOPTIC-XE) 0 5 % ophthalmic gel-forming  Self Yes Yes   Si drop daily   warfarin (COUMADIN) 2 5 mg tablet  Self Yes Yes   Sig: Take 5 mg by mouth daily     warfarin (COUMADIN) 2 5 mg tablet  Self Yes Yes   Sig: Take 2 5 mg by mouth daily      Facility-Administered Medications: None       Past Medical History:   Diagnosis Date    [facility] )     Anxiety     Arthritis     Cellulitis     L arm    Community acquired pneumonia     last assessed [id number]    Edema of left lower extremity     last assessed [id number]    Gastroenteritis     last assessed 2014    Heart murmur     Hypertension     Hypokalemia     Hyponatremia     Lipoma     Lower back pain     Mitral regurgitation     Osteopenia     Periodontal abscess     last assessed 2013    Tricuspid regurgitation        Past Surgical History:   Procedure Laterality Date    BACK SURGERY      BREAST SURGERY      R breast    CATARACT EXTRACTION Left     left eye    EYE SURGERY      HEMORRHOID SURGERY      HERNIA REPAIR      HYSTERECTOMY         Family History   Problem Relation Age of Onset    Colon cancer Mother     Heart attack Father      acute MI    Multiple sclerosis Daughter      I have reviewed and agree with the history as documented  Social History   Substance Use Topics    Smoking status: Former Smoker     Quit date: 1940    Smokeless tobacco: Never Used    Alcohol use No        Review of Systems   Constitutional: Negative for chills and fever  HENT: Negative for rhinorrhea, sore throat and trouble swallowing  Eyes: Negative for photophobia and visual disturbance  Respiratory: Negative for cough, chest tightness and shortness of breath  Cardiovascular: Negative for chest pain, palpitations and leg swelling  Gastrointestinal: Negative for abdominal pain, blood in stool, diarrhea, nausea and vomiting  Endocrine: Negative for polyuria  Genitourinary: Negative for dysuria, flank pain, hematuria, vaginal bleeding and vaginal discharge  Musculoskeletal: Negative for back pain and neck pain  Skin: Positive for wound  Negative for color change and rash  Allergic/Immunologic: Negative for immunocompromised state  Neurological: Negative for dizziness, weakness, light-headedness, numbness and headaches  All other systems reviewed and are negative  Physical Exam  ED Triage Vitals [04/14/18 1805]   Temperature Pulse Respirations Blood Pressure SpO2   98 °F (36 7 °C) 93 16 117/58 98 %      Temp src Heart Rate Source Patient Position - Orthostatic VS BP Location FiO2 (%)   -- Monitor -- -- --      Pain Score       No Pain           Orthostatic Vital Signs  Vitals:    04/14/18 1805   BP: 117/58   Pulse: 93       Physical Exam   Constitutional: Vital signs are normal  She appears well-developed and well-nourished  She is cooperative  Non-toxic appearance  She does not appear ill  No distress  HENT:   Head: Normocephalic and atraumatic  Right Ear: Hearing, tympanic membrane, external ear and ear canal normal  No hemotympanum  Left Ear: Hearing, tympanic membrane, external ear and ear canal normal  No hemotympanum     Nose: Nose normal  No septal deviation or nasal septal hematoma  Mouth/Throat: Uvula is midline, oropharynx is clear and moist and mucous membranes are normal  No tonsillar exudate  Ecchymosis around left eye  Tenderness over infraorbital area of maxilla on left  Swelling and ecchymosis to upper lip  No evidence of lip laceration or biting of her lip  Midface is stable  Eyes: Conjunctivae, EOM and lids are normal  Pupils are equal, round, and reactive to light  Neck: Trachea normal, normal range of motion and full passive range of motion without pain  Neck supple  No spinous process tenderness present  No thyroid mass and no thyromegaly present  Cardiovascular: Normal rate, normal heart sounds, intact distal pulses and normal pulses  An irregularly irregular rhythm present  No murmur heard  Pulses:       Radial pulses are 2+ on the right side, and 2+ on the left side  Dorsalis pedis pulses are 2+ on the right side, and 2+ on the left side  Pulmonary/Chest: Effort normal and breath sounds normal  She exhibits tenderness and bony tenderness  She exhibits no crepitus  Mild tenderness over left thorax  No crepitus, ecchymosis, or deformity   Abdominal: Soft  Normal appearance and bowel sounds are normal  There is no tenderness  There is no rigidity, no rebound, no guarding, no CVA tenderness, no tenderness at McBurney's point and negative Moore's sign  Musculoskeletal:   No C-spine, T-spine, L-spine tenderness  No bony tenderness throughout but otherwise noted  No other evidence of trauma  Patient able to range all joints without pain  Pelvis is stable and nontender  Negative LARON/FADIR  Neurological: She is alert  She has normal strength and normal reflexes  No cranial nerve deficit or sensory deficit  She displays a negative Romberg sign  Coordination and gait normal  GCS eye subscore is 4  GCS verbal subscore is 5  GCS motor subscore is 6     Reflex Scores:       Bicep reflexes are 2+ on the right side and 2+ on the left side  Patellar reflexes are 2+ on the right side and 2+ on the left side  Cranial nerves 2-12 intact, strength 5/5 throughout, sensation intact throughout  Visual fields normal  Normal finger-to-nose and heel-to-shin  Normal rapid alternating movements  DTRs are normal and symmetric  Skin: Skin is warm, dry and intact  Psychiatric: She has a normal mood and affect  Her speech is normal and behavior is normal  Thought content normal        ED Medications  Medications   tetanus-diphtheria-acellular pertussis (BOOSTRIX) IM injection 0 5 mL (0 5 mL Intramuscular Given 4/14/18 2008)       Diagnostic Studies  Results Reviewed     Procedure Component Value Units Date/Time    Protime-INR [[id number]]  (Abnormal) Collected:  04/14/18 1857    Lab Status:  Final result Specimen:  Blood from Arm, Right Updated:  04/14/18 1942     Protime 28 5 (H) seconds      INR 2 64 (H)                 CT facial bones without contrast   Final Result by Rocio Conley DO (04/14 1938)      Facial soft tissue contusions involving the upper lip and subcutaneous fat anterior to the 2nd metacarpal with no underlying osseous fracture  Chronic opacification of the left maxillary sinus  Workstation performed: [id number]         CT cervical spine without contrast   Final Result by Rocio Conley DO (04/14 1935)      Smooth reversal of the normal cervical lordosis with slight anterior subluxation of C3 upon C4 and C4 upon C5, likely degenerative  Diffuse osteopenia and mild diffuse degenerative change  Workstation performed: [id number]         XR chest 1 view portable   Final Result by Shabnam Zazueta MD (04/14 1934)      No acute cardiopulmonary disease  Workstation performed: [id number]         CT head without contrast   Final Result by Rocio Conley DO (04/14 1932)      No acute intracranial abnormality  Microangiopathic changes                    Workstation performed: [id number]               Procedures  Procedures      Phone Consults  ED Phone Contact    ED Course  ED Course as of Apr 14 2021   Sat Apr 14, 2018 2019 Patient able to ambulate without issue  Follow up with primary care doctor as needed  Ice and ibuprofen for swelling  Strict return precautions  I reviewed all testing with the patient  I gave oral return precautions for what to return for in addition to the written return precautions  The patient (and any family present) verbalized understanding of the discharge instructions and warnings that would necessitate return to the Emergency Department  I specifically highlighted areas of special concern regarding the written and verbal discharge instructions and return precautions  All questions were answered prior to discharge  Identification of Seniors at [address] [place] [address] Most Recent Value   (ISAR) Identification of Seniors at Risk   Before the illness or injury that brought you to the Emergency, did you need someone to help you on a regular basis? 0 Filed at: 04/14/2018 1816   In the last 24 hours, have you needed more help than usual?  0 Filed at: 04/14/2018 1816   Have you been hospitalized for one or more nights during the past 6 months? 0 Filed at: 04/14/2018 1816   In general, do you see well? 1 Filed at: 04/14/2018 1816   In general, do you have serious problems with your memory? 0 Filed at: 04/14/2018 1816   Do you take more than three different medications every day?  0 Filed at: 04/14/2018 1816   ISAR Score  1 Filed at: 04/14/2018 1816                          MDM  Number of Diagnoses or Management Options  Diagnosis management comments: CT scan head, face, neck  Chest xray  Tetanus shot  I will check an INR to evaluate whether not she is therapeutic  If workup is normal, I will ambulate the patient and discharge with primary care follow-up      CritCare Time    Disposition  Final diagnoses:   Fall, initial encounter Multiple abrasions   Swelling of upper lip     Time reflects when diagnosis was documented in both MDM as applicable and the Disposition within this note     Time User Action Codes Description Comment    4/14/2018  8:20 PM Tess Brownlee Add [Q44  NFXL] WPZE, initial encounter     4/14/2018  8:20 PM Sundar Miesha BROCK Add [T07  XXXA] Multiple abrasions     4/14/2018  8:20 PM Antony Elvis P Add [R22 0] Swelling of upper lip       ED Disposition     ED Disposition Condition Comment    Discharge  Ramana Mueller discharge to home/self care  Condition at discharge: Stable        Follow-up Information     Follow up With Specialties Details Why Contact Info Additional Information    Bentley Flores,  Internal Medicine Call in 1 day for follow up [facility]  [address] Floor, [facility] [phone]       [address] [place] [address] Emergency Department Emergency Medicine Go to If symptoms worsen [address]  [phone]  ED, [address] [place] [address], [facility]        Patient's Medications   Discharge Prescriptions    No medications on file     No discharge procedures on file  ED Provider  Attending physically available and evaluated Ramana Mueller I managed the patient along with the ED Attending      Electronically Signed by         Jodi Mc MD  04/14/18 2021

## 2018-04-15 NOTE — DISCHARGE INSTRUCTIONS
Concussion   WHAT YOU NEED TO KNOW:   A concussion is a mild brain injury  It is usually caused by a bump or blow to the head from a fall, a motor vehicle crash, or a sports injury  Sometimes being shaken forcefully may cause a concussion  DISCHARGE INSTRUCTIONS:   Have someone else call 911 for the following:   · Someone tries to wake you and cannot do so  · You have a seizure, increasing confusion, or a change in personality  · Your speech becomes slurred, or you have new vision problems  Return to the emergency department if:   · You have a severe headache that does not go away  · You have arm or leg weakness, numbness, or new problems with coordination  · You have blood or clear fluid coming out of the ears or nose  Contact your healthcare provider if:   · You have nausea or are vomiting  · You feel more sleepy than usual     · Your symptoms get worse  · Your symptoms last longer than 6 weeks after the injury  · You have questions or concerns about your condition or care  Medicines:   · Acetaminophen  helps to decrease pain  It is available without a doctor's order  Ask how much to take and how often to take it  Follow directions  Acetaminophen can cause liver damage if not taken correctly  · NSAIDs , such as ibuprofen, help decrease swelling and pain  NSAIDs can cause stomach bleeding or kidney problems in certain people  If you take blood thinner medicine, always ask your healthcare provider if NSAIDs are safe for you  Always read the medicine label and follow directions  · Take your medicine as directed  Contact your healthcare provider if you think your medicine is not helping or if you have side effects  Tell him or her if you are allergic to any medicine  Keep a list of the medicines, vitamins, and herbs you take  Include the amounts, and when and why you take them  Bring the list or the pill bottles to follow-up visits   Carry your medicine list with you in case of an emergency  Follow up with your healthcare provider as directed:  Write down your questions so you remember to ask them during your visits  Self-care:   · Rest  from physical and mental activities as directed  Mental activities are those that require thinking, concentration, and attention  You will need to rest until your symptoms are gone  Rest will allow you to recover from your concussion  Ask your healthcare provider when you can return to work and other daily activities  · Have someone stay with you for the first 24 hours after your injury  Your healthcare provider should be contacted if your symptoms get worse, or you develop new symptoms  · Do not participate in sports and physical activities until your healthcare provider says it is okay  They could make your symptoms worse or lead to another concussion  Your healthcare provider will tell you when it is okay for you to return to sports or physical activities  Prevent another concussion:   · Wear protective sports equipment that fit properly  Helmets help decrease your risk of a serious brain injury  Talk to your healthcare provider about ways you can decrease your risk for a concussion if you play sports  · Wear your seat belt  every time you travel  This helps to decrease your risk of a head injury if you are in a car accident  © 2017 [facility] [facility] Information is for End User's use only and may not be sold, redistributed or otherwise used for commercial purposes  All illustrations and images included in CareNotes® are the copyrighted property of [employer] , [employer]  or Ibrahima Wright  The above information is an  only  It is not intended as medical advice for individual conditions or treatments  Talk to your doctor, nurse or pharmacist before following any medical regimen to see if it is safe and effective for you

## 2018-04-23 ENCOUNTER — ANNUAL EXAM (OUTPATIENT)
Dept: OBGYN CLINIC | Facility: CLINIC | Age: 83
End: 2018-04-23
Payer: MEDICARE

## 2018-04-23 VITALS
SYSTOLIC BLOOD PRESSURE: 110 MMHG | HEIGHT: 59 IN | WEIGHT: 163.8 LBS | DIASTOLIC BLOOD PRESSURE: 74 MMHG | BODY MASS INDEX: 33.02 KG/M2

## 2018-04-23 DIAGNOSIS — Z01.419 PAP SMEAR, AS PART OF ROUTINE GYNECOLOGICAL EXAMINATION: ICD-10-CM

## 2018-04-23 DIAGNOSIS — Z12.31 VISIT FOR SCREENING MAMMOGRAM: Primary | ICD-10-CM

## 2018-04-23 DIAGNOSIS — Z13.820 SCREENING FOR OSTEOPOROSIS: ICD-10-CM

## 2018-04-23 DIAGNOSIS — Z01.419 ENCOUNTER FOR GYNECOLOGICAL EXAMINATION WITHOUT ABNORMAL FINDING: ICD-10-CM

## 2018-04-23 PROCEDURE — G0101 CA SCREEN;PELVIC/BREAST EXAM: HCPCS | Performed by: OBSTETRICS & GYNECOLOGY

## 2018-04-23 PROCEDURE — G0145 SCR C/V CYTO,THINLAYER,RESCR: HCPCS | Performed by: OBSTETRICS & GYNECOLOGY

## 2018-04-23 RX ORDER — ESOMEPRAZOLE MAGNESIUM 40 MG/1
40 CAPSULE, DELAYED RELEASE ORAL
COMMUNITY
Start: 2012-07-19 | End: 2018-08-29 | Stop reason: SDUPTHER

## 2018-04-23 RX ORDER — WARFARIN SODIUM 2.5 MG/1
TABLET ORAL
COMMUNITY
Start: 2012-08-30 | End: 2018-08-29 | Stop reason: SDUPTHER

## 2018-04-23 RX ORDER — FLUTICASONE PROPIONATE 50 MCG
2 SPRAY, SUSPENSION (ML) NASAL
COMMUNITY

## 2018-04-23 RX ORDER — ALBUTEROL SULFATE 90 UG/1
1 AEROSOL, METERED RESPIRATORY (INHALATION) EVERY 6 HOURS
COMMUNITY
End: 2018-08-29 | Stop reason: SDUPTHER

## 2018-04-23 RX ORDER — LOSARTAN POTASSIUM 50 MG/1
50 TABLET ORAL
COMMUNITY
End: 2018-08-29 | Stop reason: SDUPTHER

## 2018-04-23 RX ORDER — CLONIDINE HYDROCHLORIDE 0.1 MG/1
0.1 TABLET ORAL
COMMUNITY
Start: 2012-07-19 | End: 2018-11-29 | Stop reason: SDUPTHER

## 2018-04-23 RX ORDER — DIPHENOXYLATE HYDROCHLORIDE AND ATROPINE SULFATE 2.5; .025 MG/1; MG/1
1 TABLET ORAL
COMMUNITY
End: 2018-08-29 | Stop reason: SDUPTHER

## 2018-04-23 RX ORDER — DILTIAZEM HYDROCHLORIDE 240 MG/1
240 CAPSULE, COATED, EXTENDED RELEASE ORAL
COMMUNITY
Start: 2012-07-19 | End: 2018-11-29 | Stop reason: SDUPTHER

## 2018-04-23 NOTE — PROGRESS NOTES
Assessment/Plan:    1  Annual gynecological exam  · Normal gynecological exam   · Umbilical hernia noted on physical exam   Patient to follow up with PCP in 2 days  · Patient is up-to-date on colonoscopy  No need to repeat as per GI  · Patient encouraged to follow up in 2 years for annual gynecological exam   Or sooner if any new symptoms develop  2   Screening for mammogram  · No complaints  · Patient to undergo screening mammogram In July  3   Screening for osteoporosis  · Patient seen by endocrine  · DEXA scan given by Endocrine  Diagnoses and all orders for this visit:    Visit for screening mammogram  -     Mammo screening bilateral w cad; Future    Screening for osteoporosis  -     DXA bone density spine hip and pelvis; Future    Pap smear, as part of routine gynecological examination  -     Liquid-based pap, screening    Encounter for gynecological examination without abnormal finding  -     Liquid-based pap, screening    Other orders  -     Cholecalciferol 2000 units CAPS; Take 1 capsule by mouth  -     multivitamin (THERAGRAN) TABS; Take 1 tablet by mouth  -     timolol (BETIMOL) 0 5 % ophthalmic solution; 1 drop  -     fluticasone (FLONASE) 50 mcg/act nasal spray; 2 sprays into each nostril  -     diltiazem (CARTIA XT) 240 mg 24 hr capsule; Take 240 mg by mouth  -     esomeprazole (NEXIUM) 40 MG capsule; Take 40 mg by mouth  -     albuterol (PROVENTIL HFA,VENTOLIN HFA) 90 mcg/act inhaler; Inhale 1 puff every 6 (six) hours  -     cloNIDine (CATAPRES) 0 1 mg tablet; Take 0 1 mg by mouth  -     losartan (COZAAR) 50 mg tablet; Take 50 mg by mouth  -     metoprolol tartrate (LOPRESSOR) 25 mg tablet; Take 25 mg by mouth  -     warfarin (COUMADIN) 2 5 mg tablet; 2 5MG DAILY AS DIRECTED          Subjective:      Patient ID: Gustavo Santacruz is a 80 y o  female      Marta Lay a an 28-year-old female with a significant past medical history of hypertension, AFib, chronic low back pain, who presents today for her annual gynecological exam   Patient is postmenopausal and does not have periods, denies vaginal bleeding, vaginal dryness, vaginal discharge, vaginal itching  Patient denies any changes in bowel or bladder  Patient is up-to-date on colonoscopy and will no longer be performing routine screening colonoscopies due to age, and due to benign/negative previous colonoscopies  This has been agreed upon with her gastroenterologist   Patient regularly performs self-breast exams and has not noticed any masses, skin changes, nipple discharge or pain  She is to undergo mammogram in July  Patient recently underwent a fall and was hospitalized for significant bruising on face and ribs  No fracture noted on x-rays  She still has some ecchymoses on face and thorax  Patient is also anticoagulated on Coumadin  No new medical problems  The following portions of the patient's history were reviewed and updated as appropriate: allergies, current medications, past family history, past medical history, past social history, past surgical history and problem list     Review of Systems   Constitutional: Negative  HENT: Positive for hearing loss  Eyes: Negative  Respiratory: Negative  Cardiovascular: Negative  Gastrointestinal: Negative  Endocrine: Negative  Genitourinary: Negative  Musculoskeletal: Positive for back pain and gait problem  Negative for arthralgias and joint swelling  Skin:        Bruising   Allergic/Immunologic: Negative  Neurological: Negative for dizziness, tremors, seizures, syncope, facial asymmetry, light-headedness, numbness and headaches  Hematological: Bruises/bleeds easily (On Coumadin)  Psychiatric/Behavioral: Negative  Objective:      /74   Ht 4' 11" (1 499 m)   Wt 74 3 kg (163 lb 12 8 oz)   BMI 33 08 kg/m²          Physical Exam   Constitutional: She appears well-developed and well-nourished  No distress  HENT:   Head: Normocephalic and atraumatic  Bruising on face after fall  On Coumadin   Eyes: Conjunctivae and EOM are normal    Neck: Normal range of motion  Neck supple  Cardiovascular: Intact distal pulses  An irregularly irregular rhythm present  Pulmonary/Chest: Effort normal and breath sounds normal  No respiratory distress  She has no wheezes  She exhibits tenderness (s/p fall)  Abdominal: Soft  Bowel sounds are normal  She exhibits distension  There is no tenderness  A hernia (Umbilical) is present  Genitourinary: Rectum normal, vagina normal and uterus normal  Rectal exam shows anal tone normal and guaiac negative stool  No breast swelling, tenderness, discharge or bleeding  Pelvic exam was performed with patient supine  There is no rash, tenderness, lesion or injury on the right labia  There is no rash, tenderness, lesion or injury on the left labia  Cervix exhibits no motion tenderness, no discharge and no friability  Right adnexum displays no mass, no tenderness and no fullness  Left adnexum displays no mass, no tenderness and no fullness  Skin: She is not diaphoretic

## 2018-04-25 ENCOUNTER — OFFICE VISIT (OUTPATIENT)
Dept: INTERNAL MEDICINE CLINIC | Facility: CLINIC | Age: 83
End: 2018-04-25
Payer: MEDICARE

## 2018-04-25 VITALS
OXYGEN SATURATION: 90 % | SYSTOLIC BLOOD PRESSURE: 122 MMHG | BODY MASS INDEX: 32.39 KG/M2 | HEART RATE: 85 BPM | TEMPERATURE: 97.4 F | RESPIRATION RATE: 16 BRPM | DIASTOLIC BLOOD PRESSURE: 70 MMHG | WEIGHT: 165 LBS | HEIGHT: 60 IN

## 2018-04-25 DIAGNOSIS — J32.0 CHRONIC LEFT MAXILLARY SINUSITIS: ICD-10-CM

## 2018-04-25 DIAGNOSIS — S00.531D CONTUSION OF LIP, SUBSEQUENT ENCOUNTER: ICD-10-CM

## 2018-04-25 DIAGNOSIS — R26.2 AMBULATORY DYSFUNCTION: Primary | ICD-10-CM

## 2018-04-25 DIAGNOSIS — Z91.81 STATUS POST FALL: ICD-10-CM

## 2018-04-25 PROBLEM — D72.829 LEUKOCYTOSIS: Status: ACTIVE | Noted: 2017-11-20

## 2018-04-25 PROBLEM — M54.50 LOWER BACK PAIN: Status: ACTIVE | Noted: 2017-04-13

## 2018-04-25 PROBLEM — K21.9 GASTROESOPHAGEAL REFLUX DISEASE WITHOUT ESOPHAGITIS: Status: ACTIVE | Noted: 2017-04-13

## 2018-04-25 PROBLEM — I34.0 MITRAL REGURGITATION: Status: ACTIVE | Noted: 2017-04-13

## 2018-04-25 PROBLEM — S00.531A CONTUSION OF LIP: Status: ACTIVE | Noted: 2018-04-25

## 2018-04-25 PROBLEM — I10 ESSENTIAL HYPERTENSION: Status: ACTIVE | Noted: 2017-04-13

## 2018-04-25 PROCEDURE — 99213 OFFICE O/P EST LOW 20 MIN: CPT | Performed by: INTERNAL MEDICINE

## 2018-04-25 NOTE — PROGRESS NOTES
Assessment/Plan:    Chronic left maxillary sinusitis  Found on CT face  She is asymptomatic  Recommend nasal spray    Contusion of lip  healed    Ambulatory dysfunction  Patient reminded to use walker when ambulatory at all times    Status post fall  Received Tdap in ED    1  Ambulatory dysfunction     2  Status post fall     3  Contusion of lip, subsequent encounter     4  Chronic left maxillary sinusitis         Subjective:      Patient ID: Kiara Lomas is a 80 y o  female  HPI   81yo female with HTN, osteoporosis, vitamin D def, persistent a fib, chronic LBP and anxiety here for ER follow up  She was evaluated at [facility] ED 4/14/18 following a fall  She was using her walker outside a building as she was waiting for her friend to get the car  She turned and fell onto her face  No LOC  Patient on chronic anticoagulation, INR was therapeutic  CTH showed microangiopathic changes, CT spine showed cervical degenerative changes with diffuse osteopenia and CT facial bones showed chronic opacification of L maxillary sinus and facial soft tissue contusions involving upper lip  CXR negative  She has been taking tylenol for pain  Tdap given  Lip contusion is healed  Denies Ha, dizziness, SOB or CP      The following portions of the patient's history were reviewed and updated as appropriate: allergies, current medications, past family history, past medical history, past social history, past surgical history and problem list     Current Outpatient Prescriptions:     acetaminophen (TYLENOL) 325 mg tablet, Take 325 mg by mouth every 6 (six) hours as needed for mild pain, Disp: , Rfl:     albuterol (PROAIR HFA) 90 mcg/act inhaler, Inhale 2 puffs, Disp: , Rfl:     calcium citrate (CALCITRATE) 950 MG tablet, Take 1 tablet by mouth daily, Disp: , Rfl:     cloNIDine (CATAPRES) 0 1 mg tablet, TAKE ONE TABLET DAILY, Disp: 30 tablet, Rfl: 5    denosumab (PROLIA) 60 mg/mL, Inject 60 mg under the skin once, Disp: , Rfl:     diltiazem (CARDIZEM CD) 240 mg 24 hr capsule, Take 1 capsule by mouth daily, Disp: , Rfl:     ergocalciferol (VITAMIN D2) 50,000 units, Take by mouth, Disp: , Rfl:     esomeprazole (NEXIUM) 40 MG capsule, Take 1 capsule by mouth daily, Disp: , Rfl:     fluticasone (FLONASE) 50 mcg/act nasal spray, 2 sprays into each nostril daily, Disp: , Rfl:     furosemide (LASIX) 40 mg tablet, Take 40 mg by mouth 2 (two) times a day, Disp: , Rfl:     losartan (COZAAR) 50 mg tablet, Take 50 mg by mouth daily, Disp: , Rfl:     metoprolol tartrate (LOPRESSOR) 25 mg tablet, Take 25 mg by mouth 2 (two) times a day, Disp: , Rfl:     Multiple Vitamin (MULTIVITAMIN) tablet, Take 1 tablet by mouth daily, Disp: , Rfl:     timolol (BETIMOL) 0 5 % ophthalmic solution, 1 drop, Disp: , Rfl:     warfarin (COUMADIN) 2 5 mg tablet, Take 5 mg by mouth daily  , Disp: , Rfl:     albuterol (PROVENTIL HFA,VENTOLIN HFA) 90 mcg/act inhaler, Inhale 2 puffs every 4 (four) hours as needed for wheezing, Disp: , Rfl:     albuterol (PROVENTIL HFA,VENTOLIN HFA) 90 mcg/act inhaler, Inhale 1 puff every 6 (six) hours, Disp: , Rfl:     Calcium Citrate 1040 MG TABS, Take by mouth, Disp: , Rfl:     cholecalciferol (VITAMIN D3) 1,000 units tablet, Take 50,000 Units by mouth daily, Disp: , Rfl:     Cholecalciferol 2000 units CAPS, Take 1 capsule by mouth, Disp: , Rfl:     clindamycin (CLEOCIN) 300 MG capsule, Take 300 mg by mouth 4 (four) times a day, Disp: , Rfl:     cloNIDine (CATAPRES) 0 1 mg tablet, Take 0 1 mg by mouth, Disp: , Rfl:     diltiazem (CARTIA XT) 240 mg 24 hr capsule, Take 240 mg by mouth, Disp: , Rfl:     diltiazem (TIAZAC) 240 MG 24 hr capsule, Take 240 mg by mouth daily, Disp: , Rfl:     esomeprazole (NEXIUM) 40 MG capsule, Take 40 mg by mouth, Disp: , Rfl:     esomeprazole (NexIUM) 40 mg packet, Take 40 mg by mouth every morning before breakfast, Disp: , Rfl:     fluticasone (FLONASE) 50 mcg/act nasal spray, 2 sprays into each nostril, Disp: , Rfl:     losartan (COZAAR) 50 mg tablet, Take 50 mg by mouth, Disp: , Rfl:     metoprolol tartrate (LOPRESSOR) 25 mg tablet, Take 25 mg by mouth, Disp: , Rfl:     Multiple Vitamins-Minerals (MULTI-VITAMIN/MINERALS PO), Take by mouth daily, Disp: , Rfl:     multivitamin (THERAGRAN) TABS, Take 1 tablet by mouth, Disp: , Rfl:     timolol (TIMOPTIC-XE) 0 5 % ophthalmic gel-forming, 1 drop daily, Disp: , Rfl:     warfarin (COUMADIN) 2 5 mg tablet, Take 2 5 mg by mouth daily, Disp: , Rfl:     warfarin (COUMADIN) 2 5 mg tablet, 2 5MG DAILY AS DIRECTED, Disp: , Rfl:     Review of Systems   Constitutional: Negative  HENT: Negative  Respiratory: Negative  Cardiovascular: Negative  Musculoskeletal: Positive for back pain and gait problem  Neurological: Negative for dizziness and headaches  Objective:    /70 (BP Location: Left arm, Patient Position: Sitting, Cuff Size: Standard)   Pulse 85   Temp (!) 97 4 °F (36 3 °C)   Resp 16   Ht 5' (1 524 m)   Wt 74 8 kg (165 lb)   SpO2 90%   BMI 32 22 kg/m²      Physical Exam   Constitutional: She is oriented to person, place, and time  She appears well-developed and well-nourished  No distress  HENT:   Nose: Left sinus exhibits maxillary sinus tenderness  Mouth/Throat: Uvula is midline, oropharynx is clear and moist and mucous membranes are normal    Eyes:   Wears glasses   Cardiovascular: Normal heart sounds and normal pulses  An irregular rhythm present  Pulmonary/Chest: Effort normal and breath sounds normal  No respiratory distress  She has no wheezes  Neurological: She is alert and oriented to person, place, and time     Skin:   Scattered healing ecchymoses on her face, worse on L side

## 2018-04-26 LAB
LAB AP GYN PRIMARY INTERPRETATION: NORMAL
Lab: NORMAL

## 2018-04-30 ENCOUNTER — APPOINTMENT (OUTPATIENT)
Dept: LAB | Facility: HOSPITAL | Age: 83
End: 2018-04-30
Payer: MEDICARE

## 2018-04-30 ENCOUNTER — TRANSCRIBE ORDERS (OUTPATIENT)
Dept: LAB | Facility: HOSPITAL | Age: 83
End: 2018-04-30

## 2018-04-30 DIAGNOSIS — E55.9 VITAMIN D DEFICIENCY: Primary | ICD-10-CM

## 2018-04-30 DIAGNOSIS — M81.0 OSTEOPOROSIS, UNSPECIFIED OSTEOPOROSIS TYPE, UNSPECIFIED PATHOLOGICAL FRACTURE PRESENCE: ICD-10-CM

## 2018-04-30 LAB
25(OH)D3 SERPL-MCNC: 20 NG/ML (ref 30–100)
ANION GAP SERPL CALCULATED.3IONS-SCNC: 4 MMOL/L (ref 4–13)
BUN SERPL-MCNC: 20 MG/DL (ref 5–25)
CALCIUM SERPL-MCNC: 8.8 MG/DL (ref 8.3–10.1)
CHLORIDE SERPL-SCNC: 104 MMOL/L (ref 100–108)
CO2 SERPL-SCNC: 31 MMOL/L (ref 21–32)
CREAT SERPL-MCNC: 0.85 MG/DL (ref 0.6–1.3)
GFR SERPL CREATININE-BSD FRML MDRD: 61 ML/MIN/1.73SQ M
GLUCOSE P FAST SERPL-MCNC: 83 MG/DL (ref 65–99)
POTASSIUM SERPL-SCNC: 3.9 MMOL/L (ref 3.5–5.3)
SODIUM SERPL-SCNC: 139 MMOL/L (ref 136–145)

## 2018-04-30 PROCEDURE — 82523 COLLAGEN CROSSLINKS: CPT

## 2018-04-30 PROCEDURE — 84080 ASSAY ALKALINE PHOSPHATASES: CPT

## 2018-04-30 PROCEDURE — 82306 VITAMIN D 25 HYDROXY: CPT

## 2018-04-30 PROCEDURE — 36415 COLL VENOUS BLD VENIPUNCTURE: CPT

## 2018-04-30 PROCEDURE — 80048 BASIC METABOLIC PNL TOTAL CA: CPT

## 2018-05-01 DIAGNOSIS — I48.20 CHRONIC ATRIAL FIBRILLATION (HCC): Primary | ICD-10-CM

## 2018-05-02 LAB — COLLAGEN CTX SERPL-MCNC: 787 PG/ML

## 2018-05-03 ENCOUNTER — ANTICOAG VISIT (OUTPATIENT)
Dept: INTERNAL MEDICINE CLINIC | Facility: CLINIC | Age: 83
End: 2018-05-03

## 2018-05-03 ENCOUNTER — TRANSCRIBE ORDERS (OUTPATIENT)
Dept: ADMINISTRATIVE | Facility: HOSPITAL | Age: 83
End: 2018-05-03

## 2018-05-03 DIAGNOSIS — M81.0 SENILE OSTEOPOROSIS: Primary | ICD-10-CM

## 2018-05-03 DIAGNOSIS — I48.91 ATRIAL FIBRILLATION, UNSPECIFIED TYPE (HCC): ICD-10-CM

## 2018-05-03 DIAGNOSIS — E55.9 AVITAMINOSIS D: ICD-10-CM

## 2018-05-03 DIAGNOSIS — I10 BENIGN ESSENTIAL HYPERTENSION: Primary | ICD-10-CM

## 2018-05-03 DIAGNOSIS — I48.20 CHRONIC ATRIAL FIBRILLATION (HCC): ICD-10-CM

## 2018-05-03 LAB — ALP BONE SERPL-MCNC: 18.2 UG/L

## 2018-05-03 RX ORDER — DILTIAZEM HYDROCHLORIDE 240 MG/1
240 CAPSULE, COATED, EXTENDED RELEASE ORAL DAILY
Qty: 90 CAPSULE | Refills: 1 | Status: SHIPPED | OUTPATIENT
Start: 2018-05-03 | End: 2018-08-29 | Stop reason: SDUPTHER

## 2018-05-09 ENCOUNTER — APPOINTMENT (OUTPATIENT)
Dept: LAB | Facility: HOSPITAL | Age: 83
End: 2018-05-09
Payer: MEDICARE

## 2018-05-09 ENCOUNTER — ANTICOAG VISIT (OUTPATIENT)
Dept: CARDIOLOGY CLINIC | Facility: CLINIC | Age: 83
End: 2018-05-09

## 2018-05-09 DIAGNOSIS — I48.91 ATRIAL FIBRILLATION, UNSPECIFIED TYPE (HCC): ICD-10-CM

## 2018-05-09 DIAGNOSIS — I10 ESSENTIAL (PRIMARY) HYPERTENSION: ICD-10-CM

## 2018-05-09 DIAGNOSIS — I48.91 ATRIAL FIBRILLATION (HCC): ICD-10-CM

## 2018-05-09 LAB
ALBUMIN SERPL BCP-MCNC: 3.2 G/DL (ref 3.5–5)
ALP SERPL-CCNC: 77 U/L (ref 46–116)
ALT SERPL W P-5'-P-CCNC: 17 U/L (ref 12–78)
ANION GAP SERPL CALCULATED.3IONS-SCNC: 7 MMOL/L (ref 4–13)
AST SERPL W P-5'-P-CCNC: 17 U/L (ref 5–45)
BILIRUB SERPL-MCNC: 0.82 MG/DL (ref 0.2–1)
BUN SERPL-MCNC: 21 MG/DL (ref 5–25)
CALCIUM SERPL-MCNC: 8.2 MG/DL (ref 8.3–10.1)
CHLORIDE SERPL-SCNC: 106 MMOL/L (ref 100–108)
CO2 SERPL-SCNC: 26 MMOL/L (ref 21–32)
CREAT SERPL-MCNC: 0.77 MG/DL (ref 0.6–1.3)
GFR SERPL CREATININE-BSD FRML MDRD: 69 ML/MIN/1.73SQ M
GLUCOSE P FAST SERPL-MCNC: 94 MG/DL (ref 65–99)
INR PPP: 2.7 (ref 0.86–1.16)
POTASSIUM SERPL-SCNC: 4 MMOL/L (ref 3.5–5.3)
PROT SERPL-MCNC: 6.7 G/DL (ref 6.4–8.2)
PROTHROMBIN TIME: 29 SECONDS (ref 12.1–14.4)
SODIUM SERPL-SCNC: 139 MMOL/L (ref 136–145)

## 2018-05-09 PROCEDURE — 36415 COLL VENOUS BLD VENIPUNCTURE: CPT

## 2018-05-09 PROCEDURE — 80053 COMPREHEN METABOLIC PANEL: CPT

## 2018-05-09 PROCEDURE — 85610 PROTHROMBIN TIME: CPT

## 2018-05-10 DIAGNOSIS — I48.91 ATRIAL FIBRILLATION, UNSPECIFIED TYPE (HCC): Primary | ICD-10-CM

## 2018-05-21 ENCOUNTER — OFFICE VISIT (OUTPATIENT)
Dept: INTERNAL MEDICINE CLINIC | Facility: CLINIC | Age: 83
End: 2018-05-21
Payer: MEDICARE

## 2018-05-21 VITALS
OXYGEN SATURATION: 96 % | DIASTOLIC BLOOD PRESSURE: 80 MMHG | WEIGHT: 166.8 LBS | BODY MASS INDEX: 32.75 KG/M2 | SYSTOLIC BLOOD PRESSURE: 120 MMHG | RESPIRATION RATE: 16 BRPM | HEIGHT: 60 IN | TEMPERATURE: 98.4 F | HEART RATE: 90 BPM

## 2018-05-21 DIAGNOSIS — M81.0 AGE-RELATED OSTEOPOROSIS WITHOUT CURRENT PATHOLOGICAL FRACTURE: ICD-10-CM

## 2018-05-21 DIAGNOSIS — R19.8 CHANGE IN BOWEL MOVEMENT: ICD-10-CM

## 2018-05-21 DIAGNOSIS — I10 ESSENTIAL HYPERTENSION: Primary | ICD-10-CM

## 2018-05-21 PROBLEM — S00.531A CONTUSION OF LIP: Status: RESOLVED | Noted: 2018-04-25 | Resolved: 2018-05-21

## 2018-05-21 PROCEDURE — 99214 OFFICE O/P EST MOD 30 MIN: CPT | Performed by: INTERNAL MEDICINE

## 2018-05-21 NOTE — ASSESSMENT & PLAN NOTE
With underlying vitamin D deficiency, is currently being repleted by [facility]  She is on prolia  Next DXA due 10/2018 per [facility]

## 2018-05-21 NOTE — PROGRESS NOTES
Assessment/Plan:    Change in bowel movement  Intermittent increased frequency of loose stools  Advised trial of daily metamucil for now  Osteoporosis  With underlying vitamin D deficiency, is currently being repleted by Endo  She is on prolia  Next DXA due 10/2018 per Endo  Essential hypertension  Benign, stable on losartan 50mg daily, clonidine 0 1mg daily, diltiazem 240mg daily, furosemide 40mg and metoprolol 25mg twice daily  1  Essential hypertension     2  Change in bowel movement     3  Age-related osteoporosis without current pathological fracture         Subjective:      Patient ID: Noel Dalal is a 80 y o  female  81yo female with HTN, osteoporosis, vitamin D def, persistent a fib, chronic LBP, anxiety here for follow up care  Hypertension   This is a chronic problem  The current episode started more than 1 year ago  The problem is controlled  Past treatments include calcium channel blockers, beta blockers, central alpha agonists, diuretics and alpha 1 blockers  She received her third dose of prolia by [facility]  She had a recent fall and a history of thoracic compression fracture  She is scheduled for repeat DXA 10/2018  Her vitamin D was low and has been prescribed loading dose  She has had numerous episodes of diarrhea that comes and goes that started Fall 2017  Latest episode was last week  She has regular BMs in AM then late afternoon has uneasy abdominal feeling then before she is able to go to the bathroom she has leakage which can go on for about an hour  She admits that her eating habits are not the best and follows a regular diet  She denies melena or abd pain  She has lost weight, she does not eat lunch      The following portions of the patient's history were reviewed and updated as appropriate: allergies, current medications, past family history, past medical history, past social history, past surgical history and problem list     Current Outpatient Prescriptions:     acetaminophen (TYLENOL) 325 mg tablet, Take 325 mg by mouth every 6 (six) hours as needed for mild pain, Disp: , Rfl:     albuterol (PROAIR HFA) 90 mcg/act inhaler, Inhale 2 puffs, Disp: , Rfl:     calcium citrate (CALCITRATE) 950 MG tablet, Take 1 tablet by mouth daily, Disp: , Rfl:     cholecalciferol (VITAMIN D3) 1,000 units tablet, Take 50,000 Units by mouth daily, Disp: , Rfl:     cloNIDine (CATAPRES) 0 1 mg tablet, TAKE ONE TABLET DAILY, Disp: 30 tablet, Rfl: 5    denosumab (PROLIA) 60 mg/mL, Inject 60 mg under the skin once, Disp: , Rfl:     diltiazem (CARDIZEM CD) 240 mg 24 hr capsule, Take 1 capsule (240 mg total) by mouth daily, Disp: 90 capsule, Rfl: 1    esomeprazole (NEXIUM) 40 MG capsule, Take 1 capsule by mouth daily, Disp: , Rfl:     fluticasone (FLONASE) 50 mcg/act nasal spray, 2 sprays into each nostril daily, Disp: , Rfl:     furosemide (LASIX) 40 mg tablet, Take 40 mg by mouth 2 (two) times a day, Disp: , Rfl:     losartan (COZAAR) 50 mg tablet, Take 50 mg by mouth, Disp: , Rfl:     metoprolol tartrate (LOPRESSOR) 25 mg tablet, Take 1 tablet (25 mg total) by mouth 2 (two) times a day, Disp: 180 tablet, Rfl: 1    Multiple Vitamin (MULTIVITAMIN) tablet, Take 1 tablet by mouth daily, Disp: , Rfl:     timolol (BETIMOL) 0 5 % ophthalmic solution, 1 drop, Disp: , Rfl:     warfarin (COUMADIN) 2 5 mg tablet, 2 5MG DAILY AS DIRECTED, Disp: , Rfl:     albuterol (PROVENTIL HFA,VENTOLIN HFA) 90 mcg/act inhaler, Inhale 2 puffs every 4 (four) hours as needed for wheezing, Disp: , Rfl:     albuterol (PROVENTIL HFA,VENTOLIN HFA) 90 mcg/act inhaler, Inhale 1 puff every 6 (six) hours, Disp: , Rfl:     Calcium Citrate 1040 MG TABS, Take by mouth, Disp: , Rfl:     Cholecalciferol 2000 units CAPS, Take 1 capsule by mouth, Disp: , Rfl:     cloNIDine (CATAPRES) 0 1 mg tablet, Take 0 1 mg by mouth, Disp: , Rfl:     diltiazem (CARTIA XT) 240 mg 24 hr capsule, Take 240 mg by mouth, Disp: , Rfl:     diltiazem (TIAZAC) 240 MG 24 hr capsule, Take 240 mg by mouth daily, Disp: , Rfl:     ergocalciferol (VITAMIN D2) 50,000 units, Take by mouth, Disp: , Rfl:     esomeprazole (NEXIUM) 40 MG capsule, Take 40 mg by mouth, Disp: , Rfl:     esomeprazole (NexIUM) 40 mg packet, Take 40 mg by mouth every morning before breakfast, Disp: , Rfl:     fluticasone (FLONASE) 50 mcg/act nasal spray, 2 sprays into each nostril, Disp: , Rfl:     losartan (COZAAR) 50 mg tablet, Take 50 mg by mouth daily, Disp: , Rfl:     metoprolol tartrate (LOPRESSOR) 25 mg tablet, Take 25 mg by mouth 2 (two) times a day, Disp: , Rfl:     metoprolol tartrate (LOPRESSOR) 25 mg tablet, Take 25 mg by mouth, Disp: , Rfl:     Multiple Vitamins-Minerals (MULTI-VITAMIN/MINERALS PO), Take by mouth daily, Disp: , Rfl:     multivitamin (THERAGRAN) TABS, Take 1 tablet by mouth, Disp: , Rfl:     timolol (TIMOPTIC-XE) 0 5 % ophthalmic gel-forming, 1 drop daily, Disp: , Rfl:     warfarin (COUMADIN) 2 5 mg tablet, Take 5 mg by mouth daily  , Disp: , Rfl:     warfarin (COUMADIN) 2 5 mg tablet, Take 2 5 mg by mouth daily, Disp: , Rfl:     Review of Systems   Constitutional: Positive for unexpected weight change  Respiratory: Negative  Cardiovascular: Negative  Gastrointestinal: Positive for diarrhea  Negative for abdominal distention, blood in stool, nausea and vomiting  Musculoskeletal: Positive for back pain  Neurological: Negative  Objective:    /80   Pulse 90   Temp 98 4 °F (36 9 °C)   Resp 16   Ht 5' (1 524 m)   Wt 75 7 kg (166 lb 12 8 oz)   SpO2 96%   BMI 32 58 kg/m²      Physical Exam   Constitutional: She is oriented to person, place, and time  She appears well-developed and well-nourished  HENT:   Mouth/Throat: Oropharynx is clear and moist    Eyes:   Wears glasses   Neck: Neck supple  Cardiovascular: Normal rate, normal heart sounds and intact distal pulses    An irregularly irregular rhythm present  Pulmonary/Chest: Effort normal and breath sounds normal  No respiratory distress  She has no wheezes  Abdominal: Soft  Bowel sounds are normal  She exhibits no distension  There is no tenderness  Neurological: She is alert and oriented to person, place, and time  Psychiatric: She has a normal mood and affect  Vitals reviewed

## 2018-05-21 NOTE — ASSESSMENT & PLAN NOTE
Benign, stable on losartan 50mg daily, clonidine 0 1mg daily, diltiazem 240mg daily, furosemide 40mg and metoprolol 25mg twice daily

## 2018-06-12 ENCOUNTER — ANTICOAG VISIT (OUTPATIENT)
Dept: CARDIOLOGY CLINIC | Facility: CLINIC | Age: 83
End: 2018-06-12

## 2018-06-12 ENCOUNTER — APPOINTMENT (OUTPATIENT)
Dept: LAB | Facility: HOSPITAL | Age: 83
End: 2018-06-12
Attending: INTERNAL MEDICINE
Payer: MEDICARE

## 2018-06-12 DIAGNOSIS — I48.91 ATRIAL FIBRILLATION, UNSPECIFIED TYPE (HCC): ICD-10-CM

## 2018-07-03 ENCOUNTER — ANTICOAG VISIT (OUTPATIENT)
Dept: CARDIOLOGY CLINIC | Facility: CLINIC | Age: 83
End: 2018-07-03

## 2018-07-03 ENCOUNTER — APPOINTMENT (OUTPATIENT)
Dept: LAB | Facility: HOSPITAL | Age: 83
End: 2018-07-03
Attending: INTERNAL MEDICINE
Payer: MEDICARE

## 2018-07-03 DIAGNOSIS — I48.91 ATRIAL FIBRILLATION, UNSPECIFIED TYPE (HCC): ICD-10-CM

## 2018-07-03 DIAGNOSIS — I48.91 ATRIAL FIBRILLATION (HCC): ICD-10-CM

## 2018-07-03 LAB
INR PPP: 2.75 (ref 0.86–1.17)
PROTHROMBIN TIME: 29.1 SECONDS (ref 11.8–14.2)

## 2018-07-03 PROCEDURE — 36415 COLL VENOUS BLD VENIPUNCTURE: CPT

## 2018-07-03 PROCEDURE — 85610 PROTHROMBIN TIME: CPT

## 2018-07-11 ENCOUNTER — TRANSCRIBE ORDERS (OUTPATIENT)
Dept: ADMISSIONS | Facility: HOSPITAL | Age: 83
End: 2018-07-11

## 2018-07-11 ENCOUNTER — HOSPITAL ENCOUNTER (OUTPATIENT)
Dept: RADIOLOGY | Facility: HOSPITAL | Age: 83
Discharge: HOME | End: 2018-07-11
Attending: OBSTETRICS & GYNECOLOGY
Payer: MEDICARE

## 2018-07-11 DIAGNOSIS — Z12.31 VISIT FOR SCREENING MAMMOGRAM: ICD-10-CM

## 2018-07-11 PROCEDURE — 77067 SCR MAMMO BI INCL CAD: CPT

## 2018-07-17 DIAGNOSIS — I10 BENIGN ESSENTIAL HTN: Primary | ICD-10-CM

## 2018-07-17 RX ORDER — LOSARTAN POTASSIUM 50 MG/1
TABLET ORAL
Qty: 30 TABLET | Refills: 5 | Status: SHIPPED | OUTPATIENT
Start: 2018-07-17 | End: 2018-08-29 | Stop reason: SDUPTHER

## 2018-07-26 ENCOUNTER — ANTICOAG VISIT (OUTPATIENT)
Dept: CARDIOLOGY CLINIC | Facility: CLINIC | Age: 83
End: 2018-07-26

## 2018-07-26 ENCOUNTER — APPOINTMENT (OUTPATIENT)
Dept: LAB | Facility: HOSPITAL | Age: 83
End: 2018-07-26
Attending: INTERNAL MEDICINE
Payer: MEDICARE

## 2018-07-26 DIAGNOSIS — I48.91 ATRIAL FIBRILLATION, UNSPECIFIED TYPE (HCC): ICD-10-CM

## 2018-08-09 ENCOUNTER — APPOINTMENT (OUTPATIENT)
Dept: LAB | Facility: HOSPITAL | Age: 83
End: 2018-08-09
Attending: INTERNAL MEDICINE
Payer: MEDICARE

## 2018-08-09 ENCOUNTER — ANTICOAG VISIT (OUTPATIENT)
Dept: CARDIOLOGY CLINIC | Facility: CLINIC | Age: 83
End: 2018-08-09

## 2018-08-09 DIAGNOSIS — I48.91 ATRIAL FIBRILLATION, UNSPECIFIED TYPE (HCC): ICD-10-CM

## 2018-08-21 DIAGNOSIS — I48.91 ATRIAL FIBRILLATION, UNSPECIFIED TYPE (HCC): Primary | ICD-10-CM

## 2018-08-22 RX ORDER — WARFARIN SODIUM 2.5 MG/1
TABLET ORAL
Qty: 45 TABLET | Refills: 11 | OUTPATIENT
Start: 2018-08-22 | End: 2018-08-29 | Stop reason: SDUPTHER

## 2018-08-27 ENCOUNTER — HOSPITAL ENCOUNTER (EMERGENCY)
Facility: HOSPITAL | Age: 83
Discharge: HOME | End: 2018-08-27
Attending: EMERGENCY MEDICINE
Payer: COMMERCIAL

## 2018-08-27 VITALS
HEART RATE: 83 BPM | DIASTOLIC BLOOD PRESSURE: 92 MMHG | WEIGHT: 166.89 LBS | TEMPERATURE: 97.6 F | RESPIRATION RATE: 18 BRPM | SYSTOLIC BLOOD PRESSURE: 147 MMHG | BODY MASS INDEX: 32.59 KG/M2 | OXYGEN SATURATION: 98 %

## 2018-08-27 DIAGNOSIS — S81.812A LACERATION OF LEFT LOWER EXTREMITY, INITIAL ENCOUNTER: Primary | ICD-10-CM

## 2018-08-27 PROCEDURE — 99283 EMERGENCY DEPT VISIT LOW MDM: CPT

## 2018-08-27 NOTE — ED PROVIDER NOTES
History  Chief Complaint   Patient presents with    Leg Injury     Patient states that she hit her left leg with the car door and she is on warfarin on Friday  States that she attempted to clean it yesterday and it started to bleed again  66-year-old female presents for evaluation left leg laceration that happened 3 days ago  Patient reports that she hit her leg on a car door and started to bleed  Reports that she does take Coumadin for afib and her levels are between 2 5 in 2 7 and that she gets her INR checked in 3 days  States that she has been taking her Coumadin last dose was yesterday, 5 mg  Patient states that she has been applying gauze and keeping pressure however the wound keeps bleeding  She denies any dizziness, lightheadedness, increased swelling, pus discharge from the wound  Patient does report that she noticed a red teresa however has improved with time and possibly may be due to the car door  Prior to Admission Medications   Prescriptions Last Dose Informant Patient Reported? Taking?    Calcium Citrate 1040 MG TABS  Self Yes No   Sig: Take by mouth   Cholecalciferol 2000 units CAPS   Yes No   Sig: Take 1 capsule by mouth   Multiple Vitamins-Minerals (MULTI-VITAMIN/MINERALS PO)  Self Yes No   Sig: Take by mouth daily   acetaminophen (TYLENOL) 325 mg tablet  Self Yes No   Sig: Take 325 mg by mouth every 6 (six) hours as needed for mild pain   albuterol (PROAIR HFA) 90 mcg/act inhaler  Self Yes No   Sig: Inhale 2 puffs   cloNIDine (CATAPRES) 0 1 mg tablet   Yes No   Sig: Take 0 1 mg by mouth   denosumab (PROLIA) 60 mg/mL   Yes No   Sig: Inject 60 mg under the skin once   diltiazem (CARTIA XT) 240 mg 24 hr capsule   Yes No   Sig: Take 240 mg by mouth   ergocalciferol (VITAMIN D2) 50,000 units  Self Yes No   Sig: Take by mouth   esomeprazole (NEXIUM) 40 MG capsule  Self Yes No   Sig: Take 1 capsule by mouth daily   fluticasone (FLONASE) 50 mcg/act nasal spray   Yes No   Si sprays into each nostril   losartan (COZAAR) 50 mg tablet  Self Yes No   Sig: Take 50 mg by mouth daily   metoprolol tartrate (LOPRESSOR) 25 mg tablet   No No   Sig: Take 1 tablet (25 mg total) by mouth 2 (two) times a day   timolol (BETIMOL) 0 5 % ophthalmic solution   Yes No   Si drop   warfarin (COUMADIN) 2 5 mg tablet  Self Yes No   Sig: Take 5 mg by mouth daily        Facility-Administered Medications: None       Past Medical History:   Diagnosis Date    A-fib ([facility] [address] )     Anxiety     Arthritis     Cellulitis     L arm    Community acquired pneumonia     last assessed [id number]    Edema of left lower extremity     last assessed [id number]    Gastroenteritis     last assessed 2014    Heart murmur     Hypertension     Hypokalemia     Hyponatremia     Lipoma     Lower back pain     Mitral regurgitation     Osteopenia     Periodontal abscess     last assessed 2013    Tricuspid regurgitation        Past Surgical History:   Procedure Laterality Date    BACK SURGERY      BREAST SURGERY      R breast    CATARACT EXTRACTION Left     left eye    EYE SURGERY      HEMORRHOID SURGERY      HERNIA REPAIR      HYSTERECTOMY         Family History   Problem Relation Age of Onset    Colon cancer Mother     Heart attack Father         acute MI    Multiple sclerosis Daughter      I have reviewed and agree with the history as documented  Social History   Substance Use Topics    Smoking status: Former Smoker     Quit date:     Smokeless tobacco: Never Used    Alcohol use No        Review of Systems   Constitutional: Negative for appetite change, chills and fever  Gastrointestinal: Negative for vomiting  Musculoskeletal: Negative for arthralgias and myalgias  Skin: Positive for color change and wound  Negative for pallor and rash  Physical Exam  Physical Exam   Constitutional: She appears well-developed and well-nourished  No distress     Musculoskeletal: Normal range of motion  Skin: Skin is warm  Capillary refill takes less than 2 seconds  She is not diaphoretic  No erythema  Psychiatric: She has a normal mood and affect  Vitals reviewed  Vital Signs  ED Triage Vitals [08/27/18 1022]   Temperature Pulse Respirations Blood Pressure SpO2   97 6 °F (36 4 °C) 83 18 147/92 98 %      Temp Source Heart Rate Source Patient Position - Orthostatic VS BP Location FiO2 (%)   Oral Monitor Sitting Left arm --      Pain Score       No Pain           Vitals:    08/27/18 1022   BP: 147/92   Pulse: 83   Patient Position - Orthostatic VS: Sitting       Visual Acuity      ED Medications  Medications - No data to display    Diagnostic Studies  Results Reviewed     None                 No orders to display              Procedures  Lac Repair  Date/Time: 8/27/2018 10:22 AM  Performed by: Aurelia Pierson  Authorized by: Aurelia Pierson   Body area: lower extremity  Location details: left lower leg  Laceration length: 5 cm  Foreign bodies: no foreign bodies    Wound Dehiscence:  Superficial Wound Dehiscence: simple closure      Procedure Details:  Irrigation solution: saline  Irrigation method: syringe  Skin closure: Steri-Strips (3 steri strips)  Approximation: loose  Dressing: 4x4 sterile gauze and pressure dressing  Patient tolerance: Patient tolerated the procedure well with no immediate complications             Phone Contacts  ED Phone Contact    ED Course                               MDM  Number of Diagnoses or Management Options  Laceration of left lower extremity, initial encounter:   Diagnosis management comments: 41-year-old female presents for evaluation of a laceration of her left leg 3 days ago  States that the bleeding has not stopped  Currently not bleeding in the room  I applied 3 Steri-Strips and covered with pressure gauze  I advised patient to monitor for increased redness over the wound as currently do not think it is infection    Advised patient to follow up with her family care provider in 2-3 days for recheck  Return precautions given if symptoms worsen  CritCare Time    Disposition  Final diagnoses:   Laceration of left lower extremity, initial encounter     Time reflects when diagnosis was documented in both MDM as applicable and the Disposition within this note     Time User Action Codes Description Comment    8/27/2018 12:27 PM Balta Nunn Add [[id number]] Laceration of left lower extremity, initial encounter       ED Disposition     ED Disposition Condition Comment    Discharge  Austin Lomax discharge to home/self care      Condition at discharge: Good        Follow-up Information     Follow up With Specialties Details Why [address] Denia Izquierdo, DO Internal Medicine Schedule an appointment as soon as possible for a visit in 2 days For wound re-check [address], [facility] [address] [facility]  [phone]            Discharge Medication List as of 8/27/2018 12:30 PM      CONTINUE these medications which have NOT CHANGED    Details   acetaminophen (TYLENOL) 325 mg tablet Take 325 mg by mouth every 6 (six) hours as needed for mild pain, Historical Med      !! albuterol (PROAIR HFA) 90 mcg/act inhaler Inhale 2 puffs, Starting Tue 6/5/2012, Historical Med      !! Calcium Citrate 1040 MG TABS Take by mouth, Historical Med      Cholecalciferol 2000 units CAPS Take 1 capsule by mouth, Historical Med      !! cloNIDine (CATAPRES) 0 1 mg tablet Take 0 1 mg by mouth, Starting Thu 7/19/2012, Historical Med      denosumab (PROLIA) 60 mg/mL Inject 60 mg under the skin once, Historical Med      !! diltiazem (CARTIA XT) 240 mg 24 hr capsule Take 240 mg by mouth, Starting Thu 7/19/2012, Historical Med      ergocalciferol (VITAMIN D2) 50,000 units Take by mouth, Historical Med      !! esomeprazole (NEXIUM) 40 MG capsule Take 1 capsule by mouth daily, Historical Med      !! fluticasone (FLONASE) 50 mcg/act nasal spray 2 sprays into each nostril, Historical Med      !! losartan (COZAAR) 50 mg tablet Take 50 mg by mouth daily, Historical Med      !! metoprolol tartrate (LOPRESSOR) 25 mg tablet Take 1 tablet (25 mg total) by mouth 2 (two) times a day, Starting Thu 5/3/2018, Normal      Multiple Vitamins-Minerals (MULTI-VITAMIN/MINERALS PO) Take by mouth daily, Starting Tue 6/21/2011, Historical Med      timolol (BETIMOL) 0 5 % ophthalmic solution 1 drop, Historical Med      !! warfarin (COUMADIN) 2 5 mg tablet Take 5 mg by mouth daily  , Historical Med      !! albuterol (PROVENTIL HFA,VENTOLIN HFA) 90 mcg/act inhaler Inhale 2 puffs every 4 (four) hours as needed for wheezing, Historical Med      !! albuterol (PROVENTIL HFA,VENTOLIN HFA) 90 mcg/act inhaler Inhale 1 puff every 6 (six) hours, Historical Med      !! calcium citrate (CALCITRATE) 950 MG tablet Take 1 tablet by mouth daily, Historical Med      cholecalciferol (VITAMIN D3) 1,000 units tablet Take 50,000 Units by mouth daily, Historical Med      !! cloNIDine (CATAPRES) 0 1 mg tablet TAKE ONE TABLET DAILY, Normal      !! diltiazem (CARDIZEM CD) 240 mg 24 hr capsule Take 1 capsule (240 mg total) by mouth daily, Starting Thu 5/3/2018, Normal      diltiazem (TIAZAC) 240 MG 24 hr capsule Take 240 mg by mouth daily, Historical Med      !! esomeprazole (NEXIUM) 40 MG capsule Take 40 mg by mouth, Starting Thu 7/19/2012, Historical Med      esomeprazole (NexIUM) 40 mg packet Take 40 mg by mouth every morning before breakfast, Historical Med      !! fluticasone (FLONASE) 50 mcg/act nasal spray 2 sprays into each nostril daily, Historical Med      furosemide (LASIX) 40 mg tablet Take 40 mg by mouth 2 (two) times a day, Historical Med      !! losartan (COZAAR) 50 mg tablet Take 50 mg by mouth, Historical Med      !! losartan (COZAAR) 50 mg tablet TAKE 1 TABLET DAILY AS DIRECTED , Normal      !! metoprolol tartrate (LOPRESSOR) 25 mg tablet Take 25 mg by mouth 2 (two) times a day, Historical Med      !! metoprolol tartrate (LOPRESSOR) 25 mg tablet Take 25 mg by mouth, Starting Thu 7/19/2012, Historical Med      !! Multiple Vitamin (MULTIVITAMIN) tablet Take 1 tablet by mouth daily, Historical Med      !! multivitamin (THERAGRAN) TABS Take 1 tablet by mouth, Historical Med      timolol (TIMOPTIC-XE) 0 5 % ophthalmic gel-forming 1 drop daily, Historical Med      !! warfarin (COUMADIN) 2 5 mg tablet 2 5MG DAILY AS DIRECTED, Historical Med      !! warfarin (COUMADIN) 2 5 mg tablet TAKE 1 TO 2 TABLETS DAILY BY MOUTH OR AS ORDERED BY PHYSICIAN  , Phone In       !! - Potential duplicate medications found  Please discuss with provider  No discharge procedures on file      ED Provider  Electronically Signed by           Nia Stevenson PA-C  08/30/18 [id number]

## 2018-08-27 NOTE — DISCHARGE INSTRUCTIONS
- Remove steri-strips in 1 day  - Monitor red marking, return for worsening signs  Laceration Without Closure   WHAT YOU NEED TO KNOW:   Your laceration has gone past the time to be closed  Lacerations in areas of poor blood flow usually need to be closed within 8 hours  In areas with normal blood flow, lacerations usually need to be closed within 12 hours  Facial lacerations need to be closed within 24 hours  Your laceration has been cleaned and a dressing has been applied  Your laceration will heal on its own without sutures, staples, or other closure devices  DISCHARGE INSTRUCTIONS:   Return to the emergency department if:   · You have redness, pain, or fever that gets worse quickly  · Your wound has a bad smell or has pus draining from it  · You have bleeding that does not stop after 10 minutes of holding firm, direct pressure on your wound  Contact your healthcare provider if:  You have questions or concerns about your condition or care  Wound care:   · Keep the wound dry for the first 24 to 48 hours  or as directed  Wash your hands with soap and warm water before and after you care for your wound  After that, gently clean the wound once or twice a day with cool water  Use soap to clean around the wound, but try not to get any on the wound edges  Do not use alcohol or hydrogen peroxide to clean your wound unless you are directed to do so  · Leave your bandage on as long as directed  Bandages keep your wound clean and protected  They can also prevent swelling  Ask how to change and how often to change your bandage  Ask if you should apply antibacterial ointment  Be careful not to wrap the bandage or tape too tightly  This could cut off blood flow and cause more injury  Change your bandages when they get wet or dirty  Follow up with your healthcare provider within 2 days or as directed:  Write down your questions so you remember to ask them during your visits    © 2017 [employer] Information is for End User's use only and may not be sold, redistributed or otherwise used for commercial purposes  All illustrations and images included in CareNotes® are the copyrighted property of A [employer] Inc  or [employer]  The above information is an  only  It is not intended as medical advice for individual conditions or treatments  Talk to your doctor, nurse or pharmacist before following any medical regimen to see if it is safe and effective for you

## 2018-08-28 ENCOUNTER — TRANSCRIBE ORDERS (OUTPATIENT)
Dept: ADMINISTRATIVE | Facility: HOSPITAL | Age: 83
End: 2018-08-28

## 2018-08-28 DIAGNOSIS — Z12.31 ENCOUNTER FOR SCREENING MAMMOGRAM FOR MALIGNANT NEOPLASM OF BREAST: Primary | ICD-10-CM

## 2018-08-29 ENCOUNTER — OFFICE VISIT (OUTPATIENT)
Dept: INTERNAL MEDICINE CLINIC | Facility: CLINIC | Age: 83
End: 2018-08-29
Payer: MEDICARE

## 2018-08-29 VITALS
HEART RATE: 95 BPM | HEIGHT: 60 IN | WEIGHT: 166.4 LBS | BODY MASS INDEX: 32.67 KG/M2 | OXYGEN SATURATION: 97 % | DIASTOLIC BLOOD PRESSURE: 80 MMHG | TEMPERATURE: 97.8 F | RESPIRATION RATE: 16 BRPM | SYSTOLIC BLOOD PRESSURE: 120 MMHG

## 2018-08-29 DIAGNOSIS — S81.812D LEG LACERATION, LEFT, SUBSEQUENT ENCOUNTER: Primary | ICD-10-CM

## 2018-08-29 DIAGNOSIS — Z79.01 CHRONIC ANTICOAGULATION: ICD-10-CM

## 2018-08-29 PROBLEM — E55.9 VITAMIN D DEFICIENCY: Status: ACTIVE | Noted: 2017-05-08

## 2018-08-29 PROBLEM — Z91.81 STATUS POST FALL: Status: RESOLVED | Noted: 2018-04-25 | Resolved: 2018-08-29

## 2018-08-29 PROCEDURE — 99213 OFFICE O/P EST LOW 20 MIN: CPT | Performed by: INTERNAL MEDICINE

## 2018-08-29 RX ORDER — PREDNISOLONE ACETATE 10 MG/ML
SUSPENSION/ DROPS OPHTHALMIC
Refills: 3 | COMMUNITY
Start: 2018-06-08 | End: 2018-11-29 | Stop reason: ALTCHOICE

## 2018-08-29 RX ORDER — TRAMADOL HYDROCHLORIDE 50 MG/1
TABLET ORAL
COMMUNITY

## 2018-08-29 NOTE — PROGRESS NOTES
Assessment/Plan:    Leg laceration, left, subsequent encounter  Secondary to car door hitting her leg  She is up to date with Td  Advised keeping steri-strips in place until they fall off  Keep open to dry as much as possible  No signs of infection  Will re-eval in one week  1  Leg laceration, left, subsequent encounter     2  Chronic anticoagulation         Subjective:      Patient ID: Hanane Monroy is a 80 y o  female  HPI  79yo female with HTN, osteoporosis, vitamin D def, a fib, chronic LBP, anxiety here for ER follow up  She presented to [facility] two days ago when she had a L leg laceration that occurred 3 days ago  Occurred when she opened the car door and hit her L leg  It immediately bled, placed a patch on and applied pressure  She is on coumadin due to afib  Her wound continued to bleed and then went to ED  Steristrips were placed  Reports erythema has improved  She received td 4/2018  She denies pain      The following portions of the patient's history were reviewed and updated as appropriate: allergies, current medications, past family history, past medical history, past social history, past surgical history and problem list     Current Outpatient Prescriptions:     acetaminophen (TYLENOL) 325 mg tablet, Take 325 mg by mouth every 6 (six) hours as needed for mild pain, Disp: , Rfl:     albuterol (PROAIR HFA) 90 mcg/act inhaler, Inhale 2 puffs, Disp: , Rfl:     Calcium Citrate 1040 MG TABS, Take by mouth, Disp: , Rfl:     Cholecalciferol 2000 units CAPS, Take 1 capsule by mouth, Disp: , Rfl:     cloNIDine (CATAPRES) 0 1 mg tablet, Take 0 1 mg by mouth, Disp: , Rfl:     denosumab (PROLIA) 60 mg/mL, Inject 60 mg under the skin once, Disp: , Rfl:     diltiazem (CARTIA XT) 240 mg 24 hr capsule, Take 240 mg by mouth, Disp: , Rfl:     ergocalciferol (VITAMIN D2) 50,000 units, Take by mouth, Disp: , Rfl:     esomeprazole (NEXIUM) 40 MG capsule, Take 1 capsule by mouth daily, Disp: , Rfl:     fluticasone (FLONASE) 50 mcg/act nasal spray, 2 sprays into each nostril, Disp: , Rfl:     losartan (COZAAR) 50 mg tablet, Take 50 mg by mouth daily, Disp: , Rfl:     metoprolol tartrate (LOPRESSOR) 25 mg tablet, Take 1 tablet (25 mg total) by mouth 2 (two) times a day, Disp: 180 tablet, Rfl: 1    Multiple Vitamins-Minerals (MULTI-VITAMIN/MINERALS PO), Take by mouth daily, Disp: , Rfl:     prednisoLONE acetate (PRED FORTE) 1 % ophthalmic suspension, 1 DROP LEFT EYE 4 TIMES A DAY, Disp: , Rfl: 3    timolol (BETIMOL) 0 5 % ophthalmic solution, 1 drop, Disp: , Rfl:     traMADol (ULTRAM) 50 mg tablet, Take 1 tablet(s) BID by oral route PRN for pain, Disp: , Rfl:     warfarin (COUMADIN) 2 5 mg tablet, Take 5 mg by mouth daily  , Disp: , Rfl:       Review of Systems   Constitutional: Negative for fever  Skin: Positive for wound  Neurological: Negative for numbness  Objective:    /80 (BP Location: Left arm, Patient Position: Sitting)   Pulse 95   Temp 97 8 °F (36 6 °C)   Resp 16   Ht 5' (1 524 m)   Wt 75 5 kg (166 lb 6 4 oz)   SpO2 97%   BMI 32 50 kg/m²      Physical Exam   Constitutional: She appears well-developed and well-nourished  Cardiovascular:   Pulses:       Posterior tibial pulses are 1+ on the right side, and 1+ on the left side  Musculoskeletal:   Ambulates with a walker   Skin:   approx 5cm longitudinal laceration on L anterior tibia with 3 steri-strips intact  Minimal surrounding erythema, nontender  Vitals reviewed

## 2018-08-30 ENCOUNTER — OFFICE VISIT (OUTPATIENT)
Dept: CARDIOLOGY CLINIC | Facility: CLINIC | Age: 83
End: 2018-08-30
Payer: MEDICARE

## 2018-08-30 VITALS
DIASTOLIC BLOOD PRESSURE: 80 MMHG | HEART RATE: 95 BPM | SYSTOLIC BLOOD PRESSURE: 140 MMHG | WEIGHT: 165 LBS | OXYGEN SATURATION: 96 % | BODY MASS INDEX: 32.22 KG/M2

## 2018-08-30 DIAGNOSIS — I10 ESSENTIAL HYPERTENSION: ICD-10-CM

## 2018-08-30 DIAGNOSIS — I34.0 NON-RHEUMATIC MITRAL REGURGITATION: ICD-10-CM

## 2018-08-30 DIAGNOSIS — I48.91 ATRIAL FIBRILLATION, UNSPECIFIED TYPE (HCC): Primary | ICD-10-CM

## 2018-08-30 PROCEDURE — 99214 OFFICE O/P EST MOD 30 MIN: CPT | Performed by: INTERNAL MEDICINE

## 2018-08-30 RX ORDER — KETOROLAC TROMETHAMINE 5 MG/ML
SOLUTION OPHTHALMIC
COMMUNITY
Start: 2018-07-20 | End: 2018-11-29 | Stop reason: ALTCHOICE

## 2018-08-30 NOTE — PROGRESS NOTES
Cardiology Follow Up    Eze Corona  12/5/1929  [id number]  [address] [facility] [address]    1  Atrial fibrillation, unspecified type ([facility] )     2  Non-rheumatic mitral regurgitation     3  Essential hypertension         Interval History:  No cardiovascular complaints  Tolerates all activity  Denies chest pain shortness of breath orthopnea paroxysmal nocturnal dyspnea or syncope  Patient Active Problem List   Diagnosis    Atrial fibrillation ([place] [address] ) [I48 [id number]]    Allergic rhinitis    Anxiety    Bilateral lower extremity edema    Esophageal reflux    Essential hypertension    Gastroesophageal reflux disease without esophagitis    Glaucoma    Incomplete emptying of bladder    Leukocytosis    Lipoma    Lower back pain    Lumbosacral spondylosis    Mitral regurgitation    Osteoporosis    Ambulatory dysfunction    Chronic left maxillary sinusitis    Change in bowel movement    Leg laceration, left, subsequent encounter    Vitamin D deficiency    Chronic anticoagulation     Past Medical History:   Diagnosis Date    A-fib ([place] [address] )     Anxiety     Arthritis     Cellulitis     L arm    Community acquired pneumonia     last assessed [id number]    Edema of left lower extremity     last assessed [id number]    Gastroenteritis     last assessed [id number]    Heart murmur     Hypertension     Hypokalemia     Hyponatremia     Lipoma     Lower back pain     Mitral regurgitation     Osteopenia     Periodontal abscess     last assessed [id number]    Tricuspid regurgitation      Social History     Social History    Marital status:       Spouse name: N/A    Number of children: N/A    Years of education: N/A     Occupational History    retired      Social History Main Topics    Smoking status: Former Smoker     Quit date: 1940    Smokeless tobacco: Never Used    Alcohol use No    Drug use: No    Sexual activity: Not on file     Other Topics Concern    Not on file     Social History Narrative    Denied home environment domestic violence          Family History   Problem Relation Age of Onset    Colon cancer Mother     Heart attack Father         acute MI    Multiple sclerosis Daughter      Past Surgical History:   Procedure Laterality Date    BACK SURGERY      BREAST SURGERY      R breast    CATARACT EXTRACTION Left     left eye    EYE SURGERY      HEMORRHOID SURGERY      HERNIA REPAIR      HYSTERECTOMY         Current Outpatient Prescriptions:     acetaminophen (TYLENOL) 325 mg tablet, Take 325 mg by mouth every 6 (six) hours as needed for mild pain, Disp: , Rfl:     Calcium Citrate 1040 MG TABS, Take by mouth, Disp: , Rfl:     Cholecalciferol 2000 units CAPS, Take 1 capsule by mouth, Disp: , Rfl:     cloNIDine (CATAPRES) 0 1 mg tablet, Take 0 1 mg by mouth, Disp: , Rfl:     denosumab (PROLIA) 60 mg/mL, Inject 60 mg under the skin once, Disp: , Rfl:     diltiazem (CARTIA XT) 240 mg 24 hr capsule, Take 240 mg by mouth, Disp: , Rfl:     ergocalciferol (VITAMIN D2) 50,000 units, Take by mouth, Disp: , Rfl:     esomeprazole (NEXIUM) 40 MG capsule, Take 1 capsule by mouth daily, Disp: , Rfl:     fluticasone (FLONASE) 50 mcg/act nasal spray, 2 sprays into each nostril, Disp: , Rfl:     ketorolac (ACULAR) 0 5 % ophthalmic solution, , Disp: , Rfl:     losartan (COZAAR) 50 mg tablet, Take 50 mg by mouth daily, Disp: , Rfl:     metoprolol tartrate (LOPRESSOR) 25 mg tablet, Take 1 tablet (25 mg total) by mouth 2 (two) times a day, Disp: 180 tablet, Rfl: 1    prednisoLONE acetate (PRED FORTE) 1 % ophthalmic suspension, 1 DROP LEFT EYE 4 TIMES A DAY, Disp: , Rfl: 3    timolol (BETIMOL) 0 5 % ophthalmic solution, 1 drop, Disp: , Rfl:     warfarin (COUMADIN) 2 5 mg tablet, Take 5 mg by mouth daily  , Disp: , Rfl:     albuterol (PROAIR HFA) 90 mcg/act inhaler, Inhale 2 puffs, Disp: , Rfl:     Multiple Vitamins-Minerals (MULTI-VITAMIN/MINERALS PO), Take by mouth daily, Disp: , Rfl:     traMADol (ULTRAM) 50 mg tablet, Take 1 tablet(s) BID by oral route PRN for pain, Disp: , Rfl:   Allergies   Allergen Reactions    Erythromycin Rash and Shortness Of Breath    Nitrofurantoin Anaphylaxis    Oxycodone-Acetaminophen Other (See Comments) and Nausea Only     Constipation, nausea, dry heaves    Penicillins Rash and Shortness Of Breath    Salicylates Hives and Shortness Of Breath    Aspirin Rash    Codeine GI Intolerance and Rash    Macrolides And Ketolides     Percolone [Oxycodone] GI Intolerance    Pollen Extract     Sulfa Antibiotics Rash       Labs: Ancillary Orders on 07/27/2018   Component Date Value    Protime 08/09/2018 23 1*    INR 08/09/2018 2 04*   Ancillary Orders on 07/13/2018   Component Date Value    Protime 07/26/2018 32 6*    INR 07/26/2018 3 18*   Appointment on 07/03/2018   Component Date Value    Protime 07/03/2018 29 1*    INR 07/03/2018 2 75*     Imaging: No results found  Review of Systems:  Review of Systems   Constitutional: Negative for fatigue  HENT: Negative for hearing loss  Eyes: Negative for discharge  Respiratory: Negative for shortness of breath  Cardiovascular: Negative for chest pain, palpitations and leg swelling  Gastrointestinal: Negative for abdominal pain  Endocrine: Negative for polyuria  Genitourinary: Negative for dysuria  Musculoskeletal: Negative for back pain and myalgias  Skin: Negative for rash  Neurological: Negative for syncope  Hematological: Does not bruise/bleed easily  Psychiatric/Behavioral: Negative for confusion and sleep disturbance  Physical Exam:  Physical Exam   Constitutional: She is oriented to person, place, and time  She appears well-developed and well-nourished  HENT:   Head: Normocephalic and atraumatic     Mouth/Throat: Oropharynx is clear and moist    Eyes: EOM are normal    Neck: Normal range of motion  Neck supple  No JVD present  Cardiovascular: Normal heart sounds  irreg   Pulmonary/Chest: Effort normal and breath sounds normal    Abdominal: Soft  Bowel sounds are normal    Musculoskeletal: Normal range of motion  She exhibits no edema  Neurological: She is alert and oriented to person, place, and time  She has normal reflexes  Skin: Skin is warm and dry  Psychiatric: She has a normal mood and affect  Her behavior is normal  Judgment and thought content normal    Nursing note and vitals reviewed  Discussion/Summary:  Kosta Calles is doing well  She is in atrial fibrillation with good rate control  She continues on warfarin  Read most recent echo shows moderate mitral regurgitation  She is asymptomatic in functional class 1  Blood pressure is controlled  I have made no changes I will see her again in 6 months

## 2018-09-04 ENCOUNTER — APPOINTMENT (OUTPATIENT)
Dept: LAB | Facility: HOSPITAL | Age: 83
End: 2018-09-04
Attending: INTERNAL MEDICINE
Payer: MEDICARE

## 2018-09-04 ENCOUNTER — ANTICOAG VISIT (OUTPATIENT)
Dept: CARDIOLOGY CLINIC | Facility: CLINIC | Age: 83
End: 2018-09-04

## 2018-09-04 DIAGNOSIS — I48.91 ATRIAL FIBRILLATION, UNSPECIFIED TYPE (HCC): ICD-10-CM

## 2018-09-06 ENCOUNTER — OFFICE VISIT (OUTPATIENT)
Dept: INTERNAL MEDICINE CLINIC | Facility: CLINIC | Age: 83
End: 2018-09-06
Payer: MEDICARE

## 2018-09-06 VITALS
HEART RATE: 86 BPM | BODY MASS INDEX: 33.02 KG/M2 | SYSTOLIC BLOOD PRESSURE: 120 MMHG | HEIGHT: 60 IN | DIASTOLIC BLOOD PRESSURE: 80 MMHG | OXYGEN SATURATION: 96 % | RESPIRATION RATE: 16 BRPM | TEMPERATURE: 98.4 F | WEIGHT: 168.2 LBS

## 2018-09-06 DIAGNOSIS — S81.812D LEG LACERATION, LEFT, SUBSEQUENT ENCOUNTER: Primary | ICD-10-CM

## 2018-09-06 DIAGNOSIS — Z23 NEED FOR INFLUENZA VACCINATION: ICD-10-CM

## 2018-09-06 DIAGNOSIS — Z79.01 CHRONIC ANTICOAGULATION: ICD-10-CM

## 2018-09-06 PROCEDURE — G0008 ADMIN INFLUENZA VIRUS VAC: HCPCS

## 2018-09-06 PROCEDURE — 90662 IIV NO PRSV INCREASED AG IM: CPT

## 2018-09-06 PROCEDURE — 99213 OFFICE O/P EST LOW 20 MIN: CPT | Performed by: INTERNAL MEDICINE

## 2018-09-06 NOTE — ASSESSMENT & PLAN NOTE
Less sangienous drainage  Continue on coumadin  Steristrips replaced  Advised keeping wound clean and dry to air  Cover only when going out in the community  Re-eval in one week

## 2018-09-06 NOTE — PROGRESS NOTES
Assessment/Plan:    Leg laceration, left, subsequent encounter  Less sangienous drainage  Continue on coumadin  Steristrips replaced  Advised keeping wound clean and dry to air  Cover only when going out in the community  Re-eval in one week  1  Leg laceration, left, subsequent encounter     2  Chronic anticoagulation     3  Need for influenza vaccination  influenza vaccine, [id number], high-dose, PF 0 5 mL, for patients 65 yr+ (FLUZONE HIGH-DOSE)       Subjective:      Patient ID: Prabhakar Calhonu is a 80 y o  female  HPI  81yo female with HTN, vitamin D def, a fib, chronic LBP, anxiety and osteoporosis here for re-eval of L leg laceration sustained more than a week ago after hitting the car door  She is on coumadin  She had presented to ED and steristrips were placed  She reprots minimal bloody drainage, denies pustular drainage or fever  Nontender  She has been keeping the wound covered most times      The following portions of the patient's history were reviewed and updated as appropriate: allergies, current medications, past family history, past medical history, past social history, past surgical history and problem list     Current Outpatient Prescriptions:     acetaminophen (TYLENOL) 325 mg tablet, Take 325 mg by mouth every 6 (six) hours as needed for mild pain, Disp: , Rfl:     albuterol (PROAIR HFA) 90 mcg/act inhaler, Inhale 2 puffs, Disp: , Rfl:     Calcium Citrate 1040 MG TABS, Take by mouth, Disp: , Rfl:     Cholecalciferol 2000 units CAPS, Take 1 capsule by mouth, Disp: , Rfl:     cloNIDine (CATAPRES) 0 1 mg tablet, Take 0 1 mg by mouth, Disp: , Rfl:     denosumab (PROLIA) 60 mg/mL, Inject 60 mg under the skin once, Disp: , Rfl:     diltiazem (CARTIA XT) 240 mg 24 hr capsule, Take 240 mg by mouth, Disp: , Rfl:     ergocalciferol (VITAMIN D2) 50,000 units, Take by mouth, Disp: , Rfl:     esomeprazole (NEXIUM) 40 MG capsule, Take 1 capsule by mouth daily, Disp: , Rfl:     fluticasone (FLONASE) 50 mcg/act nasal spray, 2 sprays into each nostril, Disp: , Rfl:     ketorolac (ACULAR) 0 5 % ophthalmic solution, , Disp: , Rfl:     losartan (COZAAR) 50 mg tablet, Take 50 mg by mouth daily, Disp: , Rfl:     metoprolol tartrate (LOPRESSOR) 25 mg tablet, Take 1 tablet (25 mg total) by mouth 2 (two) times a day, Disp: 180 tablet, Rfl: 1    Multiple Vitamins-Minerals (MULTI-VITAMIN/MINERALS PO), Take by mouth daily, Disp: , Rfl:     prednisoLONE acetate (PRED FORTE) 1 % ophthalmic suspension, 1 DROP LEFT EYE 4 TIMES A DAY, Disp: , Rfl: 3    timolol (BETIMOL) 0 5 % ophthalmic solution, 1 drop, Disp: , Rfl:     traMADol (ULTRAM) 50 mg tablet, Take 1 tablet(s) BID by oral route PRN for pain, Disp: , Rfl:     warfarin (COUMADIN) 2 5 mg tablet, Take 5 mg by mouth daily  , Disp: , Rfl:       Review of Systems   Constitutional: Negative for fever  Skin: Positive for wound  Negative for pallor  Objective:    /80 (BP Location: Left arm, Patient Position: Sitting)   Pulse 86   Temp 98 4 °F (36 9 °C)   Resp 16   Ht 5' (1 524 m)   Wt 76 3 kg (168 lb 3 2 oz)   SpO2 96%   BMI 32 85 kg/m²      Physical Exam   Constitutional: She appears well-developed and well-nourished  No distress  Cardiovascular:   Pulses:       Posterior tibial pulses are 1+ on the right side, and 1+ on the left side  Musculoskeletal:   Ambulates with a walker   Skin:   approx 5cm longitudinal laceration on L anterior tibia with 3 steri-strips intact  Steristrips replaced  Minimal sangenous drainage wtih minimal surrounding erythema, nontender on palpation  Vitals reviewed

## 2018-09-14 ENCOUNTER — OFFICE VISIT (OUTPATIENT)
Dept: INTERNAL MEDICINE CLINIC | Facility: CLINIC | Age: 83
End: 2018-09-14
Payer: MEDICARE

## 2018-09-14 VITALS
DIASTOLIC BLOOD PRESSURE: 88 MMHG | TEMPERATURE: 97.8 F | HEART RATE: 88 BPM | OXYGEN SATURATION: 98 % | BODY MASS INDEX: 32.79 KG/M2 | HEIGHT: 60 IN | SYSTOLIC BLOOD PRESSURE: 142 MMHG | WEIGHT: 167 LBS

## 2018-09-14 DIAGNOSIS — L03.116 CELLULITIS OF LEFT LOWER EXTREMITY: ICD-10-CM

## 2018-09-14 DIAGNOSIS — S81.812D LEG LACERATION, LEFT, SUBSEQUENT ENCOUNTER: Primary | ICD-10-CM

## 2018-09-14 PROCEDURE — 99213 OFFICE O/P EST LOW 20 MIN: CPT | Performed by: INTERNAL MEDICINE

## 2018-09-14 RX ORDER — CLINDAMYCIN HYDROCHLORIDE 300 MG/1
300 CAPSULE ORAL 4 TIMES DAILY
Qty: 28 CAPSULE | Refills: 0 | Status: SHIPPED | OUTPATIENT
Start: 2018-09-14 | End: 2018-09-21

## 2018-09-14 NOTE — ASSESSMENT & PLAN NOTE
Streak of erythema around wound with serosangenous drainage noted  Will start on clindamycin, pt has several abx allergies and intolerances with reported intolerance to doxycycline as well    Will refer to [address]

## 2018-09-14 NOTE — PROGRESS NOTES
Assessment/Plan:    Cellulitis of left lower extremity  Streak of erythema around wound with serosangenous drainage noted  Will start on clindamycin, pt has several abx allergies and intolerances with reported intolerance to doxycycline as well  Will refer to [address]    1  Leg laceration, left, subsequent encounter  Ambulatory referral to Wound Care   2  Cellulitis of left lower extremity  Ambulatory referral to Wound Care    clindamycin (CLEOCIN) 300 MG capsule       Subjective:      Patient ID: Criss Mclaughlin is a 80 y o  female  HPI  81yo female with vitamin D def, HTN, afib, chronic LBP, anxiety and osteoporosis here for re-evaluation of L leg laceration sustained two weeks ago secondary to trauma from a car door  She has been keeping her wound open to dry except for when she goes out when she applies a band-aid  Has noticed bloody drainage after placing a bandaid on over the past several days  She denies fever or pain      The following portions of the patient's history were reviewed and updated as appropriate: allergies, current medications, past family history, past medical history, past social history, past surgical history and problem list     Current Outpatient Prescriptions:     acetaminophen (TYLENOL) 325 mg tablet, Take 325 mg by mouth every 6 (six) hours as needed for mild pain, Disp: , Rfl:     albuterol (PROAIR HFA) 90 mcg/act inhaler, Inhale 2 puffs, Disp: , Rfl:     Calcium Citrate 1040 MG TABS, Take by mouth, Disp: , Rfl:     Cholecalciferol 2000 units CAPS, Take 1 capsule by mouth, Disp: , Rfl:     cloNIDine (CATAPRES) 0 1 mg tablet, Take 0 1 mg by mouth, Disp: , Rfl:     denosumab (PROLIA) 60 mg/mL, Inject 60 mg under the skin once, Disp: , Rfl:     diltiazem (CARTIA XT) 240 mg 24 hr capsule, Take 240 mg by mouth, Disp: , Rfl:     ergocalciferol (VITAMIN D2) 50,000 units, Take by mouth, Disp: , Rfl:     esomeprazole (NEXIUM) 40 MG capsule, Take 1 capsule by mouth daily, Disp: , Rfl:     fluticasone (FLONASE) 50 mcg/act nasal spray, 2 sprays into each nostril, Disp: , Rfl:     ketorolac (ACULAR) 0 5 % ophthalmic solution, , Disp: , Rfl:     losartan (COZAAR) 50 mg tablet, Take 50 mg by mouth daily, Disp: , Rfl:     metoprolol tartrate (LOPRESSOR) 25 mg tablet, Take 1 tablet (25 mg total) by mouth 2 (two) times a day, Disp: 180 tablet, Rfl: 1    Multiple Vitamins-Minerals (MULTI-VITAMIN/MINERALS PO), Take by mouth daily, Disp: , Rfl:     prednisoLONE acetate (PRED FORTE) 1 % ophthalmic suspension, 1 DROP LEFT EYE 4 TIMES A DAY, Disp: , Rfl: 3    timolol (BETIMOL) 0 5 % ophthalmic solution, 1 drop, Disp: , Rfl:     traMADol (ULTRAM) 50 mg tablet, Take 1 tablet(s) BID by oral route PRN for pain, Disp: , Rfl:     warfarin (COUMADIN) 2 5 mg tablet, Take 5 mg by mouth daily  , Disp: , Rfl:     clindamycin (CLEOCIN) 300 MG capsule, Take 1 capsule (300 mg total) by mouth 4 (four) times a day for 7 days, Disp: 28 capsule, Rfl: 0      Review of Systems   Constitutional: Negative for fever  Skin: Positive for color change and wound  Negative for pallor  Objective:    /88 (BP Location: Left arm, Patient Position: Sitting, Cuff Size: Adult)   Pulse 88   Temp 97 8 °F (36 6 °C) (Tympanic)   Ht 5' (1 524 m)   Wt 75 8 kg (167 lb)   SpO2 98%   BMI 32 61 kg/m²      Physical Exam   Constitutional: She appears well-developed and well-nourished  No distress  Cardiovascular:   Pulses:       Posterior tibial pulses are 1+ on the right side, and 1+ on the left side  Musculoskeletal:   Ambulates with a walker   Skin:   approx 5cm longitudinal laceration on L anterior tibia with 3 steri-strips intact  Serosangenous fluid appreciated with streak of erythema going down her ankle with minimal edema  Nontender  Vitals reviewed

## 2018-09-20 ENCOUNTER — APPOINTMENT (OUTPATIENT)
Dept: WOUND CARE | Facility: HOSPITAL | Age: 83
End: 2018-09-20
Payer: COMMERCIAL

## 2018-09-20 PROCEDURE — 11042 DBRDMT SUBQ TIS 1ST 20SQCM/<: CPT

## 2018-09-20 PROCEDURE — 99213 OFFICE O/P EST LOW 20 MIN: CPT

## 2018-09-25 DIAGNOSIS — I10 BENIGN ESSENTIAL HYPERTENSION: ICD-10-CM

## 2018-09-25 RX ORDER — CLONIDINE HYDROCHLORIDE 0.1 MG/1
TABLET ORAL
Qty: 30 TABLET | Refills: 5 | Status: SHIPPED | OUTPATIENT
Start: 2018-09-25 | End: 2019-03-25 | Stop reason: SDUPTHER

## 2018-09-27 ENCOUNTER — APPOINTMENT (OUTPATIENT)
Dept: WOUND CARE | Facility: HOSPITAL | Age: 83
End: 2018-09-27
Payer: COMMERCIAL

## 2018-09-27 PROCEDURE — 11042 DBRDMT SUBQ TIS 1ST 20SQCM/<: CPT

## 2018-10-03 ENCOUNTER — HOSPITAL ENCOUNTER (OUTPATIENT)
Dept: RADIOLOGY | Age: 83
Discharge: HOME | End: 2018-10-03
Payer: MEDICARE

## 2018-10-03 DIAGNOSIS — E55.9 AVITAMINOSIS D: ICD-10-CM

## 2018-10-03 DIAGNOSIS — M81.0 SENILE OSTEOPOROSIS: ICD-10-CM

## 2018-10-03 PROCEDURE — 77080 DXA BONE DENSITY AXIAL: CPT

## 2018-10-04 ENCOUNTER — APPOINTMENT (OUTPATIENT)
Dept: WOUND CARE | Facility: HOSPITAL | Age: 83
End: 2018-10-04
Payer: COMMERCIAL

## 2018-10-04 PROCEDURE — 11042 DBRDMT SUBQ TIS 1ST 20SQCM/<: CPT

## 2018-10-11 ENCOUNTER — APPOINTMENT (OUTPATIENT)
Dept: LAB | Facility: HOSPITAL | Age: 83
End: 2018-10-11
Attending: INTERNAL MEDICINE
Payer: MEDICARE

## 2018-10-11 ENCOUNTER — ANTICOAG VISIT (OUTPATIENT)
Dept: CARDIOLOGY CLINIC | Facility: CLINIC | Age: 83
End: 2018-10-11

## 2018-10-11 ENCOUNTER — APPOINTMENT (OUTPATIENT)
Dept: WOUND CARE | Facility: HOSPITAL | Age: 83
End: 2018-10-11
Payer: COMMERCIAL

## 2018-10-11 DIAGNOSIS — I48.91 ATRIAL FIBRILLATION, UNSPECIFIED TYPE (HCC): ICD-10-CM

## 2018-10-11 PROCEDURE — 11042 DBRDMT SUBQ TIS 1ST 20SQCM/<: CPT

## 2018-10-12 ENCOUNTER — OFFICE VISIT (OUTPATIENT)
Dept: AUDIOLOGY | Age: 83
End: 2018-10-12
Payer: MEDICARE

## 2018-10-12 DIAGNOSIS — H90.3 SENSORINEURAL HEARING LOSS, BILATERAL: Primary | ICD-10-CM

## 2018-10-12 DIAGNOSIS — H90.3 BILATERAL SENSORINEURAL HEARING LOSS: Primary | ICD-10-CM

## 2018-10-12 PROCEDURE — 92557 COMPREHENSIVE HEARING TEST: CPT | Performed by: AUDIOLOGIST

## 2018-10-12 PROCEDURE — 92567 TYMPANOMETRY: CPT | Performed by: AUDIOLOGIST

## 2018-10-12 NOTE — PROGRESS NOTES
HEARING EVALUATION/HEARING AID VISIT    Name:  Ed Mercado  :  1929  Age:  80 y o  Date of Evaluation: 10/12/18     History: Known hearing loss  Reason for visit: Ed Mercado is being seen today at the request of Dr Lisa Felder for an evaluation of hearing  Patient reports no changes to her hearing sensitivity  Patient denies otalgia, otorrhea, dizziness, fullness, and tinnitus  Patient denies a family history of hearing loss and noise exposure  Otoscopic Evaluation:   Right Ear: Occluded, Could not visualize tympanic membrane   Left Ear: Occluded, Could not visualize tympanic membrane    Tympanometry:   Right: Type A - normal middle ear pressure and compliance   Left: Type A - normal middle ear pressure and compliance    Audiogram Results:  Pure tone testing revealed a mild sloping to profound sensorineural hearing loss bilaterally  SRT and PTA are in agreement indicating good test reliability  Word recognition scores were fair bilaterally  *see attached audiogram      Ed Mercado is fit binaurally with [employer] Q-50 312 hearing aid(s) serial number [id number] right/ serial number [id number] left  Warranty 17  Patient reports the hearing aids sound weak  Action:  Cleaned and checked hearing aids  Put new filters, domes, and sport locks on  Changed domes from medium closed domes to large closed domes  Patient noted better sound quality  Was getting feedback/reverberation - dropped high frequencys downs        RECOMMENDATIONS:  Annual hearing eval, Return to [facility]  for F/U, Ear Cleaning, Copy to Patient/Caregiver and follow up for hearing aid adjustments after ear cleaning      Tracie Leahy , KEELY-A  Clinical Audiologist

## 2018-10-25 ENCOUNTER — APPOINTMENT (OUTPATIENT)
Dept: WOUND CARE | Facility: HOSPITAL | Age: 83
End: 2018-10-25
Payer: COMMERCIAL

## 2018-10-25 PROCEDURE — 11042 DBRDMT SUBQ TIS 1ST 20SQCM/<: CPT

## 2018-10-25 NOTE — PROGRESS NOTES
DEXA scan shows osteoporosis    Please make sure patient follows up with her primary care provider    Thanks

## 2018-10-28 DIAGNOSIS — I10 BENIGN ESSENTIAL HYPERTENSION: ICD-10-CM

## 2018-10-28 DIAGNOSIS — I48.20 CHRONIC ATRIAL FIBRILLATION (HCC): ICD-10-CM

## 2018-10-29 DIAGNOSIS — I48.20 CHRONIC ATRIAL FIBRILLATION (HCC): ICD-10-CM

## 2018-10-29 RX ORDER — DILTIAZEM HYDROCHLORIDE 240 MG/1
240 CAPSULE, COATED, EXTENDED RELEASE ORAL DAILY
Qty: 90 CAPSULE | Refills: 1 | Status: SHIPPED | OUTPATIENT
Start: 2018-10-29 | End: 2019-05-02 | Stop reason: SDUPTHER

## 2018-11-08 ENCOUNTER — ANTICOAG VISIT (OUTPATIENT)
Dept: CARDIOLOGY CLINIC | Facility: CLINIC | Age: 83
End: 2018-11-08

## 2018-11-08 ENCOUNTER — TRANSCRIBE ORDERS (OUTPATIENT)
Dept: LAB | Facility: HOSPITAL | Age: 83
End: 2018-11-08

## 2018-11-08 ENCOUNTER — APPOINTMENT (OUTPATIENT)
Dept: LAB | Facility: HOSPITAL | Age: 83
End: 2018-11-08
Attending: INTERNAL MEDICINE
Payer: COMMERCIAL

## 2018-11-08 ENCOUNTER — APPOINTMENT (OUTPATIENT)
Dept: WOUND CARE | Facility: HOSPITAL | Age: 83
End: 2018-11-08
Payer: COMMERCIAL

## 2018-11-08 DIAGNOSIS — I48.91 ATRIAL FIBRILLATION, UNSPECIFIED TYPE (HCC): ICD-10-CM

## 2018-11-08 DIAGNOSIS — E55.9 AVITAMINOSIS D: ICD-10-CM

## 2018-11-08 DIAGNOSIS — M81.0 SENILE OSTEOPOROSIS: ICD-10-CM

## 2018-11-08 DIAGNOSIS — M81.0 SENILE OSTEOPOROSIS: Primary | ICD-10-CM

## 2018-11-08 LAB
25(OH)D3 SERPL-MCNC: 18.9 NG/ML (ref 30–100)
ANION GAP SERPL CALCULATED.3IONS-SCNC: 6 MMOL/L (ref 4–13)
BUN SERPL-MCNC: 23 MG/DL (ref 5–25)
CALCIUM SERPL-MCNC: 9.2 MG/DL (ref 8.3–10.1)
CHLORIDE SERPL-SCNC: 102 MMOL/L (ref 100–108)
CO2 SERPL-SCNC: 29 MMOL/L (ref 21–32)
CREAT SERPL-MCNC: 0.76 MG/DL (ref 0.6–1.3)
GFR SERPL CREATININE-BSD FRML MDRD: 70 ML/MIN/1.73SQ M
GLUCOSE P FAST SERPL-MCNC: 89 MG/DL (ref 65–99)
POTASSIUM SERPL-SCNC: 3.9 MMOL/L (ref 3.5–5.3)
SODIUM SERPL-SCNC: 137 MMOL/L (ref 136–145)

## 2018-11-08 PROCEDURE — 99213 OFFICE O/P EST LOW 20 MIN: CPT

## 2018-11-08 PROCEDURE — 82306 VITAMIN D 25 HYDROXY: CPT

## 2018-11-08 PROCEDURE — 82523 COLLAGEN CROSSLINKS: CPT

## 2018-11-08 PROCEDURE — 84080 ASSAY ALKALINE PHOSPHATASES: CPT

## 2018-11-08 PROCEDURE — 80048 BASIC METABOLIC PNL TOTAL CA: CPT

## 2018-11-12 LAB — ALP BONE SERPL-MCNC: 15 UG/L

## 2018-11-14 LAB — COLLAGEN CTX SERPL-MCNC: 592 PG/ML

## 2018-11-29 ENCOUNTER — OFFICE VISIT (OUTPATIENT)
Dept: INTERNAL MEDICINE CLINIC | Facility: CLINIC | Age: 83
End: 2018-11-29
Payer: MEDICARE

## 2018-11-29 VITALS
DIASTOLIC BLOOD PRESSURE: 80 MMHG | RESPIRATION RATE: 16 BRPM | SYSTOLIC BLOOD PRESSURE: 126 MMHG | WEIGHT: 166.6 LBS | TEMPERATURE: 97.6 F | BODY MASS INDEX: 32.71 KG/M2 | OXYGEN SATURATION: 97 % | HEART RATE: 85 BPM | HEIGHT: 60 IN

## 2018-11-29 DIAGNOSIS — M81.0 AGE-RELATED OSTEOPOROSIS WITHOUT CURRENT PATHOLOGICAL FRACTURE: Primary | ICD-10-CM

## 2018-11-29 DIAGNOSIS — I48.19 PERSISTENT ATRIAL FIBRILLATION (HCC): ICD-10-CM

## 2018-11-29 DIAGNOSIS — Z00.00 MEDICARE ANNUAL WELLNESS VISIT, SUBSEQUENT: ICD-10-CM

## 2018-11-29 DIAGNOSIS — Z79.01 CHRONIC ANTICOAGULATION: ICD-10-CM

## 2018-11-29 DIAGNOSIS — G89.29 CHRONIC BILATERAL LOW BACK PAIN, WITH SCIATICA PRESENCE UNSPECIFIED: ICD-10-CM

## 2018-11-29 DIAGNOSIS — E55.9 VITAMIN D DEFICIENCY: ICD-10-CM

## 2018-11-29 DIAGNOSIS — M54.5 CHRONIC BILATERAL LOW BACK PAIN, WITH SCIATICA PRESENCE UNSPECIFIED: ICD-10-CM

## 2018-11-29 PROBLEM — L03.116 CELLULITIS OF LEFT LOWER EXTREMITY: Status: RESOLVED | Noted: 2018-09-14 | Resolved: 2018-11-29

## 2018-11-29 PROBLEM — D72.829 LEUKOCYTOSIS: Status: RESOLVED | Noted: 2017-11-20 | Resolved: 2018-11-29

## 2018-11-29 PROBLEM — S81.812D: Status: RESOLVED | Noted: 2018-08-29 | Resolved: 2018-11-29

## 2018-11-29 PROCEDURE — G0439 PPPS, SUBSEQ VISIT: HCPCS | Performed by: INTERNAL MEDICINE

## 2018-11-29 PROCEDURE — 99214 OFFICE O/P EST MOD 30 MIN: CPT | Performed by: INTERNAL MEDICINE

## 2018-11-29 NOTE — ASSESSMENT & PLAN NOTE
With underlying osteoporosis    She admits to noncompliance with vitamin D supplements, advised to restart at least 2000units daily

## 2018-11-29 NOTE — ASSESSMENT & PLAN NOTE
Rate controlled on metoprolol tartrate 25mg twice daily, on coumadin for stroke ppx    Continue follow up with Cardio

## 2018-11-29 NOTE — PROGRESS NOTES
Assessment and Plan:    Problem List Items Addressed This Visit     None        Health Maintenance Due   Topic Date Due    SLP PLAN OF CARE  12/05/1929         HPI:  Ivy Chacon is a 80 y o  female here for her Subsequent Wellness Visit      Patient Active Problem List   Diagnosis    Atrial fibrillation ([facility] ) [I48 91]    Allergic rhinitis    Anxiety    Bilateral lower extremity edema    Esophageal reflux    Essential hypertension    Gastroesophageal reflux disease without esophagitis    Glaucoma    Incomplete emptying of bladder    Leukocytosis    Lipoma    Lower back pain    Lumbosacral spondylosis    Mitral regurgitation    Osteoporosis    Ambulatory dysfunction    Chronic left maxillary sinusitis    Change in bowel movement    Leg laceration, left, subsequent encounter    Vitamin D deficiency    Chronic anticoagulation    Cellulitis of left lower extremity     Past Medical History:   Diagnosis Date    A-fib ([facility] )     Anxiety     Arthritis     Cellulitis     L arm    Community acquired pneumonia     last assessed [id number]    Edema of left lower extremity     last assessed [id number]    Gastroenteritis     last assessed [id number]    Heart murmur     Hypertension     Hypokalemia     Hyponatremia     Lipoma     Lower back pain     Mitral regurgitation     Osteopenia     Periodontal abscess     last assessed [id number]    Tricuspid regurgitation      Past Surgical History:   Procedure Laterality Date    BACK SURGERY      BREAST SURGERY      R breast    CATARACT EXTRACTION Left     left eye    EYE SURGERY      HEMORRHOID SURGERY      HERNIA REPAIR      HYSTERECTOMY       Family History   Problem Relation Age of Onset    Colon cancer Mother     Heart attack Father         acute MI    Multiple sclerosis Daughter      History   Smoking Status    Former Smoker    Quit date: 1940   Smokeless Tobacco    Never Used     History   Alcohol Use No      History   Drug Use No Current Outpatient Prescriptions   Medication Sig Dispense Refill    acetaminophen (TYLENOL) 325 mg tablet Take 325 mg by mouth every 6 (six) hours as needed for mild pain      albuterol (PROAIR HFA) 90 mcg/act inhaler Inhale 2 puffs      Calcium Citrate 1040 MG TABS Take by mouth      Cholecalciferol 2000 units CAPS Take 1 capsule by mouth      cloNIDine (CATAPRES) 0 1 mg tablet TAKE ONE TABLET DAILY 30 tablet 5    denosumab (PROLIA) 60 mg/mL Inject 60 mg under the skin once      diltiazem (CARDIZEM CD) 240 mg 24 hr capsule TAKE 1 CAPSULE (240 MG TOTAL) BY MOUTH DAILY 90 capsule 1    ergocalciferol (VITAMIN D2) 50,000 units Take by mouth      esomeprazole (NEXIUM) 40 MG capsule Take 1 capsule by mouth daily      fluticasone (FLONASE) 50 mcg/act nasal spray 2 sprays into each nostril      losartan (COZAAR) 50 mg tablet Take 50 mg by mouth daily      metoprolol tartrate (LOPRESSOR) 25 mg tablet TAKE 1 TABLET BY MOUTH TWICE A  tablet 1    Multiple Vitamins-Minerals (MULTI-VITAMIN/MINERALS PO) Take by mouth daily      timolol (BETIMOL) 0 5 % ophthalmic solution 1 drop      traMADol (ULTRAM) 50 mg tablet Take 1 tablet(s) BID by oral route PRN for pain      warfarin (COUMADIN) 2 5 mg tablet Take 5 mg by mouth daily         No current facility-administered medications for this visit        Allergies   Allergen Reactions    Erythromycin Rash and Shortness Of Breath    Nitrofurantoin Anaphylaxis    Oxycodone-Acetaminophen Other (See Comments) and Nausea Only     Constipation, nausea, dry heaves    Penicillins Rash and Shortness Of Breath    Salicylates Hives and Shortness Of Breath    Aspirin Rash    Codeine GI Intolerance and Rash    Macrolides And Ketolides     Percolone [Oxycodone] GI Intolerance    Pollen Extract     Sulfa Antibiotics Rash     Immunization History   Administered Date(s) Administered    Influenza 12/06/2004, 11/04/2005, 01/19/2010, 10/10/2016, 09/18/2017    Influenza Split High Dose Preservative Free IM 09/03/2013, 09/12/2014, 09/15/2015, 09/15/2015, 10/07/2016, 10/21/2017    Influenza, high dose seasonal 0 5 mL 09/06/2018    Pneumococcal Conjugate 13-Valent 01/28/2015    Pneumococcal Polysaccharide PPV23 01/01/2000    Tdap 04/14/2018       Patient Care Team:  Oscar Enriquez DO as PCP - MD [place] Joe MD Jenney Pion, MD Suzen Reason, MD    Medicare Screening Tests and Risk Assessments: Tonny Quiroz is here for her Subsequent Wellness visit  Last Medicare Wellness visit information reviewed, patient interviewed and updates made to the record today  Health Risk Assessment:  Patient rates overall health as good  Patient feels that their physical health rating is Same  Eyesight was rated as Same  Hearing was rated as Same  Patient feels that their emotional and mental health rating is Same  Pain experienced by patient in the last 7 days has been A lot  Patient's pain rating has been 9/10  Patient states that she has experienced no weight loss or gain in last 6 months  Emotional/Mental Health:  Patient has been feeling nervous/anxious  PHQ-9 Depression Screening:    Frequency of the following problems over the past two weeks:      1  Little interest or pleasure in doing things: 0 - not at all      2  Feeling down, depressed, or hopeless: 0 - not at all  PHQ-2 Score: 0          Broken Bones/Falls: Fall Risk Assessment:    In the past year, patient has experienced: History of falling in past year     Number of falls: 2 or more  Patient feels unsteady standing  Patient is not taking medication that can cause feelings of lightheadedness or tiredness  Patient often has no need to rush to the toilet  Bladder/Bowel:  Patient has leaked urine accidently in the last six months  Patient reports no loss of bowel control  Immunizations:  Patient has had a flu vaccination within the last year    Patient has not received a pneumonia shot  Patient has not received a shingles shot  Patient has received tetanus/diphtheria shot  Home Safety:  Patient has trouble with stairs inside or outside of their home  Patient currently reports that there are no safety hazards present in home, working smoke alarms, working carbon monoxide detectors  Preventative Screenings:   Breast cancer screening performed, no colon cancer screen completed, glaucoma eye exam completed,     Nutrition:  Current diet: Regular and No Added Salt with servings of the following:    Medications:  Patient is currently taking over-the-counter supplements  Patient is able to manage medications  Lifestyle Choices:  Patient reports no tobacco use  Patient has smoked or used tobacco in the past   Patient has stopped her tobacco use  Tobacco use quit date: 1985  Patient reports no alcohol use  Patient drives a vehicle  Patient wears seat belt  Current level of exercise of physical activity described by patient as: very little  Activities of Daily Living:  Can get out of bed by his or her self, able to dress self, able to make own meals, able to do own shopping, able to bathe self, can do own laundry/housekeeping, can manage own money, pay bills and track expenses    Previous Hospitalizations:  No hospitalization or ED visit in past 12 months        Advanced Directives:  Patient has decided on a power of   Patient has spoken to designated power of   Patient has completed advanced directive          Preventative Screening/Counseling:      Cardiovascular:      General: Risks and Benefits Discussed and Screening Current          Diabetes:      General: Risks and Benefits Discussed and Screening Current          Colorectal Cancer:      General: Screening Not Indicated      Comments: Due to advanced age        Breast Cancer:      General: Screening Not Indicated      Comments: Due to advanced age        Cervical Cancer: General: Screening Not Indicated      Comments: Due to advanced age        Osteoporosis:      General: Screening Current      Counseling: Calcium and Vitamin D Intake and Regular Weightbearing Exercise          AAA:      General: Screening Not Indicated          Glaucoma:      General: Risks and Benefits Discussed      Referrals: Ophthalmology          HIV:      General: Screening Not Indicated          Hepatitis C:      General: Screening Not Indicated        Advanced Directives:   Patient has living will for healthcare, has durable POA for healthcare, patient has an advanced directive  Immunizations:      Influenza: Influenza Recommended Annually and Influenza UTD This Year      Pneumococcal: Lifetime Vaccine Completed      Shingrix: Risks & Benefits Discussed and Patient Declines      Hepatitis B (Low risk patients): Series Not Indicated      Zostavax: Risks & Benefits Discussed and Patient Declines      TDAP: Tdap Vaccine UTD      Other Preventative Counseling (Non-Medicare): Fall Prevention, Increase physical activity and Car/seat belt/driving safety reviewed      Referrals:   Additional Comments: None today

## 2018-11-29 NOTE — PROGRESS NOTES
Assessment/Plan:    Osteoporosis  L forearm  She has been receiving prolia per Endo    Vitamin D deficiency  With underlying osteoporosis  She admits to noncompliance with vitamin D supplements, advised to restart at least 2000units daily    Lower back pain  With underlying arthritis  She has received numerous injections per Pain Management  Chronic anticoagulation  Secondary to persistent a fib  INR followed by [facility]  Atrial fibrillation ([facility] [address] ) [I48 [id number]]  Rate controlled on metoprolol tartrate 25mg twice daily, on coumadin for stroke ppx  Continue follow up with Cardio      1  Age-related osteoporosis without current pathological fracture     2  Vitamin D deficiency     3  Chronic anticoagulation     4  Chronic bilateral low back pain, with sciatica presence unspecified     5  Persistent atrial fibrillation ([facility] [address] )     6  Medicare annual wellness visit, subsequent         Subjective:      Patient ID: Ibrahima Cuba is a 80 y o  female  HPI  81yo female with osteoporosis, vitamin D def, HTN, persistent a fib, anxiety and chronic LBP here for follow up care  Had DXA 10/3/2018 that showed osteoporosis in L forearm Tscore -3 9  She has received 3 injections of prolia  She has had 2 falls over the past year  She admits she has not been taking vitamin D supplement and is currently deficient  She denies bleeding episodes on coumadin but has easy bruising  Reports her back has been "miserable"  She received three injections in her lumbar region by Pain Management 10/2018 without relief  Each time she gets up she gets a sharp pain in her back      The following portions of the patient's history were reviewed and updated as appropriate: allergies, current medications, past family history, past medical history, past social history, past surgical history and problem list     Current Outpatient Prescriptions:     acetaminophen (TYLENOL) 325 mg tablet, Take 325 mg by mouth every 6 (six) hours as needed for mild pain, Disp: , Rfl:     albuterol (PROAIR HFA) 90 mcg/act inhaler, Inhale 2 puffs, Disp: , Rfl:     Calcium Citrate 1040 MG TABS, Take by mouth, Disp: , Rfl:     Cholecalciferol 2000 units CAPS, Take 1 capsule by mouth, Disp: , Rfl:     cloNIDine (CATAPRES) 0 1 mg tablet, TAKE ONE TABLET DAILY, Disp: 30 tablet, Rfl: 5    denosumab (PROLIA) 60 mg/mL, Inject 60 mg under the skin once, Disp: , Rfl:     diltiazem (CARDIZEM CD) 240 mg 24 hr capsule, TAKE 1 CAPSULE (240 MG TOTAL) BY MOUTH DAILY, Disp: 90 capsule, Rfl: 1    ergocalciferol (VITAMIN D2) 50,000 units, Take by mouth, Disp: , Rfl:     esomeprazole (NEXIUM) 40 MG capsule, Take 1 capsule by mouth daily, Disp: , Rfl:     fluticasone (FLONASE) 50 mcg/act nasal spray, 2 sprays into each nostril, Disp: , Rfl:     losartan (COZAAR) 50 mg tablet, Take 50 mg by mouth daily, Disp: , Rfl:     metoprolol tartrate (LOPRESSOR) 25 mg tablet, TAKE 1 TABLET BY MOUTH TWICE A DAY, Disp: 180 tablet, Rfl: 1    Multiple Vitamins-Minerals (MULTI-VITAMIN/MINERALS PO), Take by mouth daily, Disp: , Rfl:     timolol (BETIMOL) 0 5 % ophthalmic solution, 1 drop, Disp: , Rfl:     traMADol (ULTRAM) 50 mg tablet, Take 1 tablet(s) BID by oral route PRN for pain, Disp: , Rfl:     warfarin (COUMADIN) 2 5 mg tablet, Take 5 mg by mouth daily  , Disp: , Rfl:       Review of Systems   Constitutional: Negative  HENT: Positive for hearing loss  Respiratory: Negative  Cardiovascular: Positive for palpitations  Negative for chest pain and leg swelling  Gastrointestinal:        Heartburn   Genitourinary:        Urinary incontinence   Musculoskeletal: Positive for back pain and gait problem  Neurological: Positive for light-headedness  Negative for headaches  Hematological: Bruises/bleeds easily  Psychiatric/Behavioral: Negative          Objective:    /80 (BP Location: Left arm, Patient Position: Sitting)   Pulse 85   Temp 97 6 °F (36 4 °C)   Resp 16   Ht 5' (1 524 m)   Wt 75 6 kg (166 lb 9 6 oz)   SpO2 97%   BMI 32 54 kg/m²      Physical Exam   Constitutional: She is oriented to person, place, and time  She appears well-developed and well-nourished  No distress  HENT:   Right Ear: External ear normal    Left Ear: External ear normal    Mouth/Throat: Oropharynx is clear and moist  No oropharyngeal exudate  Wears b/l hearing aids   Eyes: Conjunctivae and EOM are normal    Wears glasses   Neck: Neck supple  No thyromegaly present  Cardiovascular: Normal rate and normal heart sounds  An irregularly irregular rhythm present  Pulses:       Posterior tibial pulses are 2+ on the right side, and 2+ on the left side  B/L varicose veins   Pulmonary/Chest: Effort normal and breath sounds normal  No respiratory distress  She has no wheezes  Abdominal: Soft  Bowel sounds are normal  She exhibits no distension  There is no tenderness  Musculoskeletal:   Ambulates with a walker   Neurological: She is alert and oriented to person, place, and time  Skin: Skin is dry  Psychiatric: She has a normal mood and affect  Vitals reviewed        Recent Results (from the past 1008 hour(s))   Protime-INR    Collection Time: 11/08/18 11:59 AM   Result Value Ref Range    Protime 28 4 (H) 11 8 - 14 2 seconds    INR 2 66 (H) 0 86 - 1 17   Alkaline phosphatase, bone specific    Collection Time: 11/08/18 11:59 AM   Result Value Ref Range    Tandem-R Ostase 15 0 ug/L   Basic metabolic panel    Collection Time: 11/08/18 11:59 AM   Result Value Ref Range    Sodium 137 136 - 145 mmol/L    Potassium 3 9 3 5 - 5 3 mmol/L    Chloride 102 100 - 108 mmol/L    CO2 29 21 - 32 mmol/L    ANION GAP 6 4 - 13 mmol/L    BUN 23 5 - 25 mg/dL    Creatinine 0 76 0 60 - 1 30 mg/dL    Glucose, Fasting 89 65 - 99 mg/dL    Calcium 9 2 8 3 - 10 1 mg/dL    eGFR 70 ml/min/1 73sq m   Vitamin D 25 hydroxy    Collection Time: 11/08/18 11:59 AM   Result Value Ref Range    Vit D, 25-Hydroxy 18 9 (L) 30 0 - 100 0 ng/mL   C-Telopeptide    Collection Time: 11/08/18 11:59 AM   Result Value Ref Range    C-TELOPEPTIDE,SERUM 592 pg/mL

## 2018-11-29 NOTE — PATIENT INSTRUCTIONS
Obesity   AMBULATORY CARE:   Obesity  is when your body mass index (BMI) is greater than 30  Your healthcare provider will use your height and weight to measure your BMI  The risks of obesity include  many health problems, such as injuries or physical disability  You may need tests to check for the following:  · Diabetes     · High blood pressure or high cholesterol     · Heart disease     · Gallbladder or liver disease     · Cancer of the colon, breast, prostate, liver, or kidney     · Sleep apnea     · Arthritis or gout  Seek care immediately if:   · You have a severe headache, confusion, or difficulty speaking  · You have weakness on one side of your body  · You have chest pain, sweating, or shortness of breath  Contact your healthcare provider if:   · You have symptoms of gallbladder or liver disease, such as pain in your upper abdomen  · You have knee or hip pain and discomfort while walking  · You have symptoms of diabetes, such as intense hunger and thirst, and frequent urination  · You have symptoms of sleep apnea, such as snoring or daytime sleepiness  · You have questions or concerns about your condition or care  Treatment for obesity  focuses on helping you lose weight to improve your health  Even a small decrease in BMI can reduce the risk for many health problems  Your healthcare provider will help you set a weight-loss goal   · Lifestyle changes  are the first step in treating obesity  These include making healthy food choices and getting regular physical activity  Your healthcare provider may suggest a weight-loss program that involves coaching, education, and therapy  · Medicine  may help you lose weight when it is used with a healthy diet and physical activity  · Surgery  can help you lose weight if you are very obese and have other health problems  There are several types of weight-loss surgery  Ask your healthcare provider for more information    Be successful losing weight:   · Set small, realistic goals  An example of a small goal is to walk for 20 minutes 5 days a week  Anther goal is to lose 5% of your body weight  · Tell friends, family members, and coworkers about your goals  and ask for their support  Ask a friend to lose weight with you, or join a weight-loss support group  · Identify foods or triggers that may cause you to overeat , and find ways to avoid them  Remove tempting high-calorie foods from your home and workplace  Place a bowl of fresh fruit on your kitchen counter  If stress causes you to eat, then find other ways to cope with stress  · Keep a diary to track what you eat and drink  Also write down how many minutes of physical activity you do each day  Weigh yourself once a week and record it in your diary  Eating changes: You will need to eat 500 to 1,000 fewer calories each day than you currently eat to lose 1 to 2 pounds a week  The following changes will help you cut calories:  · Eat smaller portions  Use small plates, no larger than 9 inches in diameter  Fill your plate half full of fruits and vegetables  Measure your food using measuring cups until you know what a serving size looks like  · Eat 3 meals and 1 or 2 snacks each day  Plan your meals in advance  Jolie Commander and eat at home most of the time  Eat slowly  · Eat fruits and vegetables at every meal   They are low in calories and high in fiber, which makes you feel full  Do not add butter, margarine, or cream sauce to vegetables  Use herbs to season steamed vegetables  · Eat less fat and fewer fried foods  Eat more baked or grilled chicken and fish  These protein sources are lower in calories and fat than red meat  Limit fast food  Dress your salads with olive oil and vinegar instead of bottled dressing  · Limit the amount of sugar you eat  Do not drink sugary beverages  Limit alcohol  Activity changes:  Physical activity is good for your body in many ways   It helps you burn calories and build strong muscles  It decreases stress and depression, and improves your mood  It can also help you sleep better  Talk to your healthcare provider before you begin an exercise program   · Exercise for at least 30 minutes 5 days a week  Start slowly  Set aside time each day for physical activity that you enjoy and that is convenient for you  It is best to do both weight training and an activity that increases your heart rate, such as walking, bicycling, or swimming  · Find ways to be more active  Do yard work and housecleaning  Walk up the stairs instead of using elevators  Spend your leisure time going to events that require walking, such as outdoor festivals or fairs  This extra physical activity can help you lose weight and keep it off  Follow up with your healthcare provider as directed: You may need to meet with a dietitian  Write down your questions so you remember to ask them during your visits  © 2017 [address] [employer] Information is for End User's use only and may not be sold, redistributed or otherwise used for commercial purposes  All illustrations and images included in CareNotes® are the copyrighted property of [employer]  or Ibrahima Wright  The above information is an  only  It is not intended as medical advice for individual conditions or treatments  Talk to your doctor, nurse or pharmacist before following any medical regimen to see if it is safe and effective for you  Urinary Incontinence   WHAT YOU NEED TO KNOW:   What is urinary incontinence? Urinary incontinence (UI) is when you lose control of your bladder  What causes UI? UI occurs because your bladder cannot store or empty urine properly  The following are the most common types of UI:  · Stress incontinence  is when you leak urine due to increased bladder pressure  This may happen when you cough, sneeze, or exercise       · Urge incontinence  is when you feel the need to urinate right away and leak urine accidentally  · Mixed incontinence  is when you have both stress and urge UI  What are the signs and symptoms of UI?   · You feel like your bladder does not empty completely when you urinate  · You urinate often and need to urinate immediately  · You leak urine when you sleep, or you wake up with the urge to urinate  · You leak urine when you cough, sneeze, exercise, or laugh  How is UI diagnosed? Your healthcare provider will ask how often you leak urine and whether you have stress or urge symptoms  Tell him which medicines you take, how often you urinate, and how much liquid you drink each day  You may need any of the following tests:  · Urine tests  may show infection or kidney function  · A pelvic exam  may be done to check for blockages  A pelvic exam will also show if your bladder, uterus, or other organs have moved out of place  · An x-ray, ultrasound, or CT  may show problems with parts of your urinary system  You may be given contrast liquid to help your organs show up better in the pictures  Tell the healthcare provider if you have ever had an allergic reaction to contrast liquid  Do not enter the MRI room with anything metal  Metal can cause serious injury  Tell the healthcare provider if you have any metal in or on your body  · A bladder scan  will show how much urine is left in your bladder after you urinate  You will be asked to urinate and then healthcare providers will use a small ultrasound machine to check the urine left in your bladder  · Cystometry  is used to check the function of your urinary system  Your healthcare provider checks the pressure in your bladder while filling it with fluid  Your bladder pressure may also be tested when your bladder is full and while you urinate  How is UI treated? · Medicines  can help strengthen your bladder control      · Electrical stimulation  is used to send a small amount of electrical energy to your pelvic floor muscles  This helps control your bladder function  Electrodes may be placed outside your body or in your rectum  For women, the electrodes may be placed in the vagina  · A bulking agent  may be injected into the wall of your urethra to make it thicker  This helps keep your urethra closed and decreases urine leakage  · Devices  such as a clamp, pessary, or tampon may help stop urine leaks  Ask your healthcare provider for more information about these and other devices  · Surgery  may be needed if other treatments do not work  Several types of surgery can help improve your bladder control  Ask your healthcare provider for more information about the surgery you may need  How can I manage my symptoms? · Do pelvic muscle exercises often  Your pelvic muscles help you stop urinating  Squeeze these muscles tight for 5 seconds, then relax for 5 seconds  Gradually work up to squeezing for 10 seconds  Do 3 sets of 15 repetitions a day, or as directed  This will help strengthen your pelvic muscles and improve bladder control  · A catheter  may be used to help empty your bladder  A catheter is a tiny, plastic tube that is put into your bladder to drain your urine  Your healthcare provider may tell you to use a catheter to prevent your bladder from getting too full and leaking urine  · Keep a UI record  Write down how often you leak urine and how much you leak  Make a note of what you were doing when you leaked urine  · Train your bladder  Go to the bathroom at set times, such as every 2 hours, even if you do not feel the urge to go  You can also try to hold your urine when you feel the urge to go  For example, hold your urine for 5 minutes when you feel the urge to go  As that becomes easier, hold your urine for 10 minutes  · Drink liquids as directed  Ask your healthcare provider how much liquid to drink each day and which liquids are best for you   You may need to limit the amount of liquid you drink to help control your urine leakage  Limit or do not have drinks that contain caffeine or alcohol  Do not drink any liquid right before you go to bed  · Prevent constipation  Eat a variety of high-fiber foods  Good examples are high-fiber cereals, beans, vegetables, and whole-grain breads  Prune juice may help make your bowel movement softer  Walking is the best way to trigger your intestines to have a bowel movement  · Exercise regularly and maintain a healthy weight  Ask your healthcare provider how much you should weigh and about the best exercise plan for you  Weight loss and exercise will decrease pressure on your bladder and help you control your leakage  Ask him to help you create a weight loss plan if you are overweight  When should I seek immediate care? · You have severe pain  · You are confused or cannot think clearly  When should I contact my healthcare provider? · You have a fever  · You see blood in your urine  · You have pain when you urinate  · You have new or worse pain, even after treatment  · Your mouth feels dry or you have vision changes  · Your urine is cloudy or smells bad  · You have questions or concerns about your condition or care  CARE AGREEMENT:   You have the right to help plan your care  Learn about your health condition and how it may be treated  Discuss treatment options with your caregivers to decide what care you want to receive  You always have the right to refuse treatment  The above information is an  only  It is not intended as medical advice for individual conditions or treatments  Talk to your doctor, nurse or pharmacist before following any medical regimen to see if it is safe and effective for you  © 2017 [address] [employer] Information is for End User's use only and may not be sold, redistributed or otherwise used for commercial purposes   All illustrations and images included in CareNotes® are the copyrighted property of [employer]  or Ibrahima Wright  Cigarette Smoking and Your Health   AMBULATORY CARE:   Risks to your health if you smoke:  Nicotine and other chemicals found in tobacco damage every cell in your body  Even if you are a light smoker, you have an increased risk for cancer, heart disease, and lung disease  If you are pregnant or have diabetes, smoking increases your risk for complications  Benefits to your health if you stop smoking:   · You decrease respiratory symptoms such as coughing, wheezing, and shortness of breath  · You reduce your risk for cancers of the lung, mouth, throat, kidney, bladder, pancreas, stomach, and cervix  If you already have cancer, you increase the benefits of chemotherapy  You also reduce your risk for cancer returning or a second cancer from developing  · You reduce your risk for heart disease, blood clots, heart attack, and stroke  · You reduce your risk for lung infections, and diseases such as pneumonia, asthma, chronic bronchitis, and emphysema  · Your circulation improves  More oxygen can be delivered to your body  If you have diabetes, you lower your risk for complications, such as kidney, artery, and eye diseases  You also lower your risk for nerve damage  Nerve damage can lead to amputations, poor vision, and blindness  · You improve your body's ability to heal and to fight infections  Benefits to the health of others if you stop smoking:  Tobacco is harmful to nonsmokers who breathe in your secondhand smoke  The following are ways the health of others around you may improve when you stop smoking:  · You lower the risks for lung cancer and heart disease in nonsmoking adults  · If you are pregnant, you lower the risk for miscarriage, early delivery, low birth weight, and stillbirth  You also lower your baby's risk for SIDS, obesity, developmental delay, and neurobehavioral problems, such as ADHD  · If you have children, you lower their risk for ear infections, colds, pneumonia, bronchitis, and asthma  For more information and support to stop smoking:   · Smokefree  gov  Phone: 5- 242 - 919-1817  Web Address: www smokefree  gov  Follow up with your healthcare provider as directed:  Write down your questions so you remember to ask them during your visits  © 2017 [address] Information is for End User's use only and may not be sold, redistributed or otherwise used for commercial purposes  All illustrations and images included in CareNotes® are the copyrighted property of [employer]  or Ibrahima Wright  The above information is an  only  It is not intended as medical advice for individual conditions or treatments  Talk to your doctor, nurse or pharmacist before following any medical regimen to see if it is safe and effective for you  Fall Prevention   AMBULATORY CARE:   Fall prevention  includes ways to make your home and other areas safer  It also includes ways you can move more carefully to prevent a fall  Health conditions that cause changes in your blood pressure, vision, or muscle strength and coordination may increase your risk for falls  Medicines may also increase your risk for falls if they make you dizzy, weak, or sleepy  Call 911 or have someone else call if:   · You have fallen and are unconscious  · You have fallen and cannot move part of your body  Contact your healthcare provider if:   · You have fallen and have pain or a headache  · You have questions or concerns about your condition or care  Fall prevention tips:   · Stand or sit up slowly  This may help you keep your balance and prevent falls  · Use assistive devices as directed  Your healthcare provider may suggest that you use a cane or walker to help you keep your balance  You may need to have grab bars put in your bathroom near the toilet or in the shower      · Wear shoes that fit well and have soles that   Wear shoes both inside and outside  Use slippers with good   Do not wear shoes with high heels  · Wear a personal alarm  This is a device that allows you to call 911 if you fall and need help  Ask your healthcare provider for more information  · Stay active  Exercise can help strengthen your muscles and improve your balance  Your healthcare provider may recommend water aerobics or walking  He or she may also recommend physical therapy to improve your coordination  Never start an exercise program without talking to your healthcare provider first      · Manage your medical conditions  Keep all appointments with your healthcare providers  Visit your eye doctor as directed  Home safety tips:   · Add items to prevent falls in the bathroom  Put nonslip strips on your bath or shower floor to prevent you from slipping  Use a bath mat if you do not have carpet in the bathroom  This will prevent you from falling when you step out of the bath or shower  Use a shower seat so you do not need to stand while you shower  Sit on the toilet or a chair in your bathroom to dry yourself and put on clothing  This will prevent you from losing your balance from drying or dressing yourself while you are standing  · Keep paths clear  Remove books, shoes, and other objects from walkways and stairs  Place cords for telephones and lamps out of the way so that you do not need to walk over them  Tape them down if you cannot move them  Remove small rugs  If you cannot remove a rug, secure it with double-sided tape  This will prevent you from tripping  · Install bright lights in your home  Use night lights to help light paths to the bathroom or kitchen  Always turn on the light before you start walking  · Keep items you use often on shelves within reach  Do not use a step stool to help you reach an item  · Paint or place reflective tape on the edges of your stairs    This will help you see the stairs better  Follow up with your healthcare provider as directed:  Write down your questions so you remember to ask them during your visits  © 2017 [address] Information is for End User's use only and may not be sold, redistributed or otherwise used for commercial purposes  All illustrations and images included in CareNotes® are the copyrighted property of [employer]  or Ibrahima Wright  The above information is an  only  It is not intended as medical advice for individual conditions or treatments  Talk to your doctor, nurse or pharmacist before following any medical regimen to see if it is safe and effective for you  Advance Directives   WHAT YOU NEED TO KNOW:   What are advance directives? Advance directives are legal documents that state your wishes and plans for medical care  These plans are made ahead of time in case you lose your ability to make decisions for yourself  Advance directives can apply to any medical decision, such as the treatments you want, and if you want to donate organs  What are the types of advance directives? There are many types of advance directives, and each state has rules about how to use them  You may choose a combination of any of the following:  · Living will: This is a written record of the treatment you want  You can also choose which treatments you do not want, which to limit, and which to stop at a certain time  This includes surgery, medicine, IV fluid, and tube feedings  · Durable power of  for healthcare [facility]): This is a written record that states who you want to make healthcare choices for you when you are unable to make them for yourself  This person, called a proxy, is usually a family member or a friend  You may choose more than 1 proxy  · Do not resuscitate (DNR) order:  A DNR order is used in case your heart stops beating or you stop breathing   It is a request not to have certain forms of treatment, such as CPR  A DNR order may be included in other types of advance directives  · Medical directive: This covers the care that you want if you are in a coma, near death, or unable to make decisions for yourself  You can list the treatments you want for each condition  Treatment may include pain medicine, surgery, blood transfusions, dialysis, IV or tube feedings, and a ventilator (breathing machine)  · Values history: This document has questions about your views, beliefs, and how you feel and think about life  This information can help others choose the care that you would choose  Why are advance directives important? An advance directive helps you control your care  Although spoken wishes may be used, it is better to have your wishes written down  Spoken wishes can be misunderstood, or not followed  Treatments may be given even if you do not want them  An advance directive may make it easier for your family to make difficult choices about your care  How do I decide what to put in my advance directives? · Make informed decisions:  Make sure you fully understand treatments or care you may receive  Think about the benefits and problems your decisions could cause for you or your family  Talk to healthcare providers if you have concerns or questions before you write down your wishes  You may also want to talk with your Sikh or , or a   Check your state laws to make sure that what you put in your advance directive is legal      · Sign all forms:  Sign and date your advance directive when you have finished  You may also need 2 witnesses to sign the forms  Witnesses cannot be your doctor or his staff, your spouse, heirs or beneficiaries, people you owe money to, or your chosen proxy  Talk to your family, proxy, and healthcare providers about your advance directive  Give each person a copy, and keep one for yourself in a place you can get to easily   Do not keep it hidden or locked away  · Review and revise your plans: You can revise your advance directive at any time, as long as you are able to make decisions  Review your plan every year, and when there are changes in your life, or your health  When you make changes, let your family, proxy, and healthcare providers know  Give each a new copy  Where can I find more information? · [employer]  [address] , [place] [address]  Phone: 7- 351 - [phone]  Phone: 5- 755 - [phone]  Web Address: http://www  aafp org  · [address] [facility])  [address] [address] [facility], [address] [facility] , [address] [place] [address]  Phone: [phone]  Phone: [phone]  Web Address: Yocasta li  CARE AGREEMENT:   You have the right to help plan your care  To help with this plan, you must learn about your health condition and treatment options  You must also learn about advance directives and how they are used  Work with your healthcare providers to decide what care will be used to treat you  You always have the right to refuse treatment  The above information is an  only  It is not intended as medical advice for individual conditions or treatments  Talk to your doctor, nurse or pharmacist before following any medical regimen to see if it is safe and effective for you  © 2017 [address] [employer] Information is for End User's use only and may not be sold, redistributed or otherwise used for commercial purposes  All illustrations and images included in CareNotes® are the copyrighted property of [employer]  or Ibrahima Wright

## 2018-12-18 ENCOUNTER — ANTICOAG VISIT (OUTPATIENT)
Dept: CARDIOLOGY CLINIC | Facility: CLINIC | Age: 83
End: 2018-12-18

## 2018-12-18 ENCOUNTER — APPOINTMENT (OUTPATIENT)
Dept: LAB | Facility: HOSPITAL | Age: 83
End: 2018-12-18
Attending: INTERNAL MEDICINE
Payer: MEDICARE

## 2018-12-18 DIAGNOSIS — I48.19 PERSISTENT ATRIAL FIBRILLATION (HCC): ICD-10-CM

## 2019-01-04 ENCOUNTER — ANTICOAG VISIT (OUTPATIENT)
Dept: CARDIOLOGY CLINIC | Facility: CLINIC | Age: 84
End: 2019-01-04

## 2019-01-04 ENCOUNTER — APPOINTMENT (OUTPATIENT)
Dept: LAB | Facility: HOSPITAL | Age: 84
End: 2019-01-04
Attending: INTERNAL MEDICINE
Payer: MEDICARE

## 2019-01-04 DIAGNOSIS — I48.19 PERSISTENT ATRIAL FIBRILLATION (HCC): ICD-10-CM

## 2019-01-14 DIAGNOSIS — I10 BENIGN ESSENTIAL HTN: ICD-10-CM

## 2019-01-14 RX ORDER — LOSARTAN POTASSIUM 50 MG/1
TABLET ORAL
Qty: 30 TABLET | Refills: 5 | Status: SHIPPED | OUTPATIENT
Start: 2019-01-14 | End: 2019-07-12 | Stop reason: SDUPTHER

## 2019-01-22 ENCOUNTER — APPOINTMENT (OUTPATIENT)
Dept: LAB | Facility: HOSPITAL | Age: 84
End: 2019-01-22
Attending: INTERNAL MEDICINE
Payer: MEDICARE

## 2019-01-22 ENCOUNTER — ANTICOAG VISIT (OUTPATIENT)
Dept: CARDIOLOGY CLINIC | Facility: CLINIC | Age: 84
End: 2019-01-22

## 2019-01-22 DIAGNOSIS — I48.19 PERSISTENT ATRIAL FIBRILLATION (HCC): ICD-10-CM

## 2019-02-27 ENCOUNTER — APPOINTMENT (OUTPATIENT)
Dept: LAB | Facility: HOSPITAL | Age: 84
End: 2019-02-27
Attending: INTERNAL MEDICINE
Payer: MEDICARE

## 2019-02-27 ENCOUNTER — ANTICOAG VISIT (OUTPATIENT)
Dept: CARDIOLOGY CLINIC | Facility: CLINIC | Age: 84
End: 2019-02-27

## 2019-02-27 DIAGNOSIS — I48.19 PERSISTENT ATRIAL FIBRILLATION (HCC): ICD-10-CM

## 2019-03-07 ENCOUNTER — OFFICE VISIT (OUTPATIENT)
Dept: CARDIOLOGY CLINIC | Facility: CLINIC | Age: 84
End: 2019-03-07
Payer: MEDICARE

## 2019-03-07 VITALS
BODY MASS INDEX: 32.51 KG/M2 | SYSTOLIC BLOOD PRESSURE: 142 MMHG | WEIGHT: 165.6 LBS | HEIGHT: 60 IN | DIASTOLIC BLOOD PRESSURE: 78 MMHG | HEART RATE: 79 BPM

## 2019-03-07 DIAGNOSIS — I48.0 PAROXYSMAL ATRIAL FIBRILLATION (HCC): Primary | ICD-10-CM

## 2019-03-07 DIAGNOSIS — I34.0 NON-RHEUMATIC MITRAL REGURGITATION: ICD-10-CM

## 2019-03-07 DIAGNOSIS — I10 ESSENTIAL HYPERTENSION: ICD-10-CM

## 2019-03-07 PROCEDURE — 99212 OFFICE O/P EST SF 10 MIN: CPT | Performed by: INTERNAL MEDICINE

## 2019-03-07 PROCEDURE — 93000 ELECTROCARDIOGRAM COMPLETE: CPT | Performed by: INTERNAL MEDICINE

## 2019-03-16 ENCOUNTER — HOSPITAL ENCOUNTER (EMERGENCY)
Facility: HOSPITAL | Age: 84
Discharge: HOME | End: 2019-03-16
Attending: EMERGENCY MEDICINE | Admitting: EMERGENCY MEDICINE
Payer: MEDICARE

## 2019-03-16 ENCOUNTER — APPOINTMENT (EMERGENCY)
Dept: RADIOLOGY | Facility: HOSPITAL | Age: 84
End: 2019-03-16
Payer: MEDICARE

## 2019-03-16 VITALS
TEMPERATURE: 98.1 F | BODY MASS INDEX: 32.29 KG/M2 | OXYGEN SATURATION: 98 % | RESPIRATION RATE: 18 BRPM | SYSTOLIC BLOOD PRESSURE: 169 MMHG | WEIGHT: 165.34 LBS | HEART RATE: 74 BPM | DIASTOLIC BLOOD PRESSURE: 79 MMHG

## 2019-03-16 DIAGNOSIS — S50.311A ABRASION OF RIGHT ELBOW, INITIAL ENCOUNTER: ICD-10-CM

## 2019-03-16 DIAGNOSIS — S81.011A KNEE LACERATION, RIGHT, INITIAL ENCOUNTER: Primary | ICD-10-CM

## 2019-03-16 LAB
INR PPP: 2.3 (ref 0.86–1.17)
PROTHROMBIN TIME: 25.4 SECONDS (ref 11.8–14.2)

## 2019-03-16 PROCEDURE — 73080 X-RAY EXAM OF ELBOW: CPT

## 2019-03-16 PROCEDURE — 36415 COLL VENOUS BLD VENIPUNCTURE: CPT | Performed by: EMERGENCY MEDICINE

## 2019-03-16 PROCEDURE — 99284 EMERGENCY DEPT VISIT MOD MDM: CPT

## 2019-03-16 PROCEDURE — 73564 X-RAY EXAM KNEE 4 OR MORE: CPT

## 2019-03-16 PROCEDURE — 85610 PROTHROMBIN TIME: CPT | Performed by: EMERGENCY MEDICINE

## 2019-03-16 PROCEDURE — 70450 CT HEAD/BRAIN W/O DYE: CPT

## 2019-03-16 RX ORDER — BACITRACIN, NEOMYCIN, POLYMYXIN B 400; 3.5; 5 [USP'U]/G; MG/G; [USP'U]/G
1 OINTMENT TOPICAL ONCE
Status: COMPLETED | OUTPATIENT
Start: 2019-03-16 | End: 2019-03-16

## 2019-03-16 RX ADMIN — BACITRACIN, NEOMYCIN, POLYMYXIN B 1 SMALL APPLICATION: 400; 3.5; 5 OINTMENT TOPICAL at 12:40

## 2019-03-16 NOTE — DISCHARGE INSTRUCTIONS
Follow up with her primary care physician  Continue to use Ace wrap to the right leg for comfort    Keep your abrasions as clean as possible

## 2019-03-16 NOTE — ED ATTENDING ATTESTATION
Gabbi Goldstein MD, saw and evaluated the patient  I have discussed the patient with the resident/non-physician practitioner and agree with the resident's/non-physician practitioner's findings, Plan of Care, and MDM as documented in the resident's/non-physician practitioner's note, except where noted  All available labs and Radiology studies were reviewed  I was present for key portions of any procedure(s) performed by the resident/non-physician practitioner and I was immediately available to provide assistance  At this point I agree with the current assessment done in the Emergency Department  I have conducted an independent evaluation of this patient a history and physical is as follows:      Critical Care Time  Procedures     81 yo female fell yesterday, mechanical fall tripping on purse  Pt with no head trauma, able to ambulate  Pt with laceration on right knee and continues to bleed  Pt with right elbow laceration  Pt on coumadin  No pain  Vss, afebrile, lungs cta, rrr, abdomen soft nontender, right knee nontender, hematoma of shin, abrasion, right elbow abrasion, nontender  Tetanus utd  Xrays, ct head, wound dressing ace wrap

## 2019-03-16 NOTE — ED PROVIDER NOTES
ASSESSMENT AND PLAN    Ibrahima Cuba is a 80 y o  female with history of chronic Coumadin use, who presents status post a fall from standing, without head strike or loss of conscious  On arrival, the patient is hemodynamically stable and well-appearing without acute distress, with a nontoxic appearance  On exam, the patient does have a laceration to the right knee, with a moderate amount of tissue loss, not amenable to sutures, with some underlying tenderness, but without deformity or crepitus  She also has a small superficial laceration to the right elbow  The rest her physical exam is largely unremarkable, and she is neurologically intact  -CT head was obtained due to the patient's anticoagulation status, and was negative  -x-rays to the right knee and right elbow negative for fracture, though with a large amount of chronic disease/arthritis  -tetanus is already up-to-date within the last year prior to arrival  -plan for discharge home  Strict return precautions provided  Recommended outpatient follow up with her primary care physician for further issues  History  Chief Complaint   Patient presents with    Fall     Pt fell yesterday injuring her R knee and elbow  Pt denies hitting her head  Pt is on thinners  This is an 60-year-old female who presents status post a fall from standing  The patient states she tripped over her purse yesterday, causing her to fall on her right elbow and right knee  She initially went home, and was able to ambulate without difficulty  She states that she felt blood dripping down her leg when she got home, and noted that she had a laceration to the right knee  She initially did not come to the hospital, thinking that it would stop bleeding on its own, however the bleeding persisted, so she came to the emergency department today  She denies any head strike or loss of conscious    She denies any significant pain, but does note that the medial aspect of the right knee is slightly painful  She has been able to ambulate persistently since the fall  She denies any lightheadedness or dizziness  She denies any chest pain shortness of breath abdominal pain, nausea, vomiting, or diarrhea  Prior to Admission Medications   Prescriptions Last Dose Informant Patient Reported? Taking?    Calcium Citrate 1040 MG TABS  Self Yes Yes   Sig: Take by mouth   Multiple Vitamins-Minerals (MULTI-VITAMIN/MINERALS PO)  Self Yes Yes   Sig: Take by mouth daily   acetaminophen (TYLENOL) 325 mg tablet  Self Yes Yes   Sig: Take 325 mg by mouth every 6 (six) hours as needed for mild pain   albuterol (PROAIR HFA) 90 mcg/act inhaler  Self Yes Yes   Sig: Inhale 2 puffs   cloNIDine (CATAPRES) 0 1 mg tablet   No Yes   Sig: TAKE ONE TABLET DAILY   denosumab (PROLIA) 60 mg/mL   Yes Yes   Sig: Inject 60 mg under the skin once   diltiazem (CARDIZEM CD) 240 mg 24 hr capsule   No Yes   Sig: TAKE 1 CAPSULE (240 MG TOTAL) BY MOUTH DAILY   ergocalciferol (VITAMIN D2) 50,000 units  Self Yes Yes   Sig: Take by mouth once a week    esomeprazole (NEXIUM) 40 MG capsule  Self Yes Yes   Sig: Take 1 capsule by mouth daily   fluticasone (FLONASE) 50 mcg/act nasal spray   Yes Yes   Si sprays into each nostril   losartan (COZAAR) 50 mg tablet   No Yes   Sig: TAKE 1 TABLET BY MOUTH EVERY DAY   metoprolol tartrate (LOPRESSOR) 25 mg tablet   No Yes   Sig: TAKE 1 TABLET BY MOUTH TWICE A DAY   timolol (BETIMOL) 0 5 % ophthalmic solution   Yes Yes   Si drop   traMADol (ULTRAM) 50 mg tablet   Yes Yes   Sig: Take 1 tablet(s) BID by oral route PRN for pain   warfarin (COUMADIN) 2 5 mg tablet  Self Yes Yes   Sig: Take 5 mg by mouth daily        Facility-Administered Medications: None       Past Medical History:   Diagnosis Date    A-fib ([address] )     Anxiety     Arthritis     Cellulitis     L arm    Community acquired pneumonia     last assessed 2015    Edema of left lower extremity     last assessed 2014   [facility] Gastroenteritis     last assessed 2014    Heart murmur     Hypertension     Hypokalemia     Hyponatremia     Lipoma     Lower back pain     Mitral regurgitation     Osteopenia     Periodontal abscess     last assessed 2013    Tricuspid regurgitation        Past Surgical History:   Procedure Laterality Date    BACK SURGERY      BREAST SURGERY      R breast    CATARACT EXTRACTION Left     left eye    EYE SURGERY      HEMORRHOID SURGERY      HERNIA REPAIR      HYSTERECTOMY         Family History   Problem Relation Age of Onset    Colon cancer Mother     Heart attack Father         acute MI    Multiple sclerosis Daughter      I have reviewed and agree with the history as documented  Social History     Tobacco Use    Smoking status: Former Smoker     Last attempt to quit: 1940     Years since quittin 2    Smokeless tobacco: Never Used   Substance Use Topics    Alcohol use: No    Drug use: No        Review of Systems   Constitutional: Negative for chills and fever  HENT: Negative for congestion and rhinorrhea  Eyes: Negative for photophobia and visual disturbance  Respiratory: Negative for cough and shortness of breath  Cardiovascular: Negative for chest pain and palpitations  Gastrointestinal: Negative for abdominal pain, diarrhea, nausea and vomiting  Genitourinary: Negative for dysuria and frequency  Musculoskeletal: Negative for neck pain and neck stiffness  Skin: Negative for pallor and rash  Neurological: Negative for light-headedness and headaches  All other systems reviewed and are negative        Physical Exam  ED Triage Vitals [19 1145]   Temperature Pulse Respirations Blood Pressure SpO2   98 1 °F (36 7 °C) 74 18 169/79 98 %      Temp Source Heart Rate Source Patient Position - Orthostatic VS BP Location FiO2 (%)   Oral Monitor Sitting Left arm --      Pain Score       4           qSOFA     Row Name 19 1201 19 1145 Altered mental status GCS < 15  0  --       Respiratory Rate > / =22  --  0       Systolic BP < / =559  --  0       Q Sofa Score  0  0             Orthostatic Vital Signs  Vitals:    03/16/19 1145   BP: 169/79   Pulse: 74   Patient Position - Orthostatic VS: Sitting       Physical Exam   Constitutional: She is oriented to person, place, and time  Resting comfortably on stretcher  Awake alert, well-appearing, no acute distress  Nontoxic in appearance  Appears stated age   HENT:   Head: Normocephalic and atraumatic  Mouth/Throat: Oropharynx is clear and moist  No oropharyngeal exudate  Eyes: Pupils are equal, round, and reactive to light  No scleral icterus  Neck: Normal range of motion  No JVD present  Cardiovascular: Normal rate, regular rhythm and normal heart sounds  No murmur heard  Pulmonary/Chest: Effort normal  No respiratory distress  She has no wheezes  She has no rales  Abdominal: Soft  She exhibits no distension  There is no tenderness  Musculoskeletal: Normal range of motion  She exhibits no edema  There is a moderate-sized abrasion to the anterior surface of the right knee, with no active bleeding  There is a small abrasion to the right elbow, with no active bleeding  There is mild tenderness to palpation over the medial aspect of the right knee  There is hematoma to the right lower leg  Neurological: She is alert and oriented to person, place, and time  She exhibits normal muscle tone  Strength is 5/5 in all extremities bilaterally  Sensation grossly intact and equal in all extremities  No cranial nerve deficits appreciated  Skin: Skin is warm and dry  No rash noted  No pallor         ED Medications  Medications   neomycin-bacitracin-polymyxin b (NEOSPORIN) ointment 1 small application (1 small application Topical Given 3/16/19 1240)       Diagnostic Studies  Results Reviewed     Procedure Component Value Units Date/Time    Protime-INR [[id number]]  (Abnormal) Collected: 03/16/19 1240    Lab Status:  Final result Specimen:  Blood from Arm, Left Updated:  03/16/19 1307     Protime 25 4 seconds      INR 2 30                 CT head without contrast   Final Result by Eneida Goldman MD (03/16 1305)      No acute intracranial abnormality  Microangiopathic changes  Workstation performed: [id number]         XR knee 4+ views Right injury   Final Result by Manuelito Verdugo MD (03/17 1274)      No acute osseous abnormality  Workstation performed: [id number]         XR elbow 3+ views RIGHT   Final Result by Manuelito Verdugo MD (03/17 9691)      No acute osseous abnormality  Workstation performed: [id number]               Procedures  Procedures      Phone Consults  ED Phone Contact    ED Course           Identification of Seniors at Risk      Most Recent Value   (ISAR) Identification of Seniors at Risk   Before the illness or injury that brought you to the Emergency, did you need someone to help you on a regular basis? 0 Filed at: 03/16/2019 1147   In the last 24 hours, have you needed more help than usual?  0 Filed at: 03/16/2019 1147   Have you been hospitalized for one or more nights during the past 6 months? 0 Filed at: 03/16/2019 1147   In general, do you see well?  0 Filed at: 03/16/2019 1147   In general, do you have serious problems with your memory? 0 Filed at: 03/16/2019 1147   Do you take more than three different medications every day?   1 Filed at: 03/16/2019 1147   ISAR Score  1 Filed at: 03/16/2019 1147                          MDM    Disposition  Final diagnoses:   Knee laceration, right, initial encounter   Abrasion of right elbow, initial encounter     Time reflects when diagnosis was documented in both MDM as applicable and the Disposition within this note     Time User Action Codes Description Comment    3/16/2019  1:18 PM Destiny Rodriguez Add [E36 201U] Knee laceration, right, initial encounter     3/16/2019  1:18 PM Mariah Bateman, [address] [place] [address] [S50 311A] Abrasion of right elbow, initial encounter       ED Disposition     ED Disposition Condition Date/Time Comment    Discharge Good Sat Mar 16, 2019  1:18 PM Chris Tanner discharge to home/self care  Follow-up Information    None         Discharge Medication List as of 3/16/2019  1:19 PM      CONTINUE these medications which have NOT CHANGED    Details   acetaminophen (TYLENOL) 325 mg tablet Take 325 mg by mouth every 6 (six) hours as needed for mild pain, Historical Med      albuterol (PROAIR HFA) 90 mcg/act inhaler Inhale 2 puffs, Starting Tue 6/5/2012, Historical Med      Calcium Citrate 1040 MG TABS Take by mouth, Historical Med      cloNIDine (CATAPRES) 0 1 mg tablet TAKE ONE TABLET DAILY, Normal      denosumab (PROLIA) 60 mg/mL Inject 60 mg under the skin once, Historical Med      diltiazem (CARDIZEM CD) 240 mg 24 hr capsule TAKE 1 CAPSULE (240 MG TOTAL) BY MOUTH DAILY, Starting Mon 10/29/2018, Normal      ergocalciferol (VITAMIN D2) 50,000 units Take by mouth once a week , Historical Med      esomeprazole (NEXIUM) 40 MG capsule Take 1 capsule by mouth daily, Historical Med      fluticasone (FLONASE) 50 mcg/act nasal spray 2 sprays into each nostril, Historical Med      losartan (COZAAR) 50 mg tablet TAKE 1 TABLET BY MOUTH EVERY DAY, Normal      metoprolol tartrate (LOPRESSOR) 25 mg tablet TAKE 1 TABLET BY MOUTH TWICE A DAY, Normal      Multiple Vitamins-Minerals (MULTI-VITAMIN/MINERALS PO) Take by mouth daily, Starting Tue 6/21/2011, Historical Med      timolol (BETIMOL) 0 5 % ophthalmic solution 1 drop, Historical Med      traMADol (ULTRAM) 50 mg tablet Take 1 tablet(s) BID by oral route PRN for pain, Historical Med      warfarin (COUMADIN) 2 5 mg tablet Take 5 mg by mouth daily  , Historical Med           No discharge procedures on file  ED Provider  Attending physically available and evaluated Chris Tanner  I managed the patient along with the ED Attending      Electronically Signed by         Devante Rahman MD  03/17/19 5045

## 2019-03-18 ENCOUNTER — ANTICOAG VISIT (OUTPATIENT)
Dept: CARDIOLOGY CLINIC | Facility: CLINIC | Age: 84
End: 2019-03-18

## 2019-03-18 DIAGNOSIS — I48.19 PERSISTENT ATRIAL FIBRILLATION (HCC): ICD-10-CM

## 2019-03-19 NOTE — PROGRESS NOTES
Assessment/Plan:    Laceration of skin of right elbow  Healing  Small, has scab over it without signs of infection  Will monitor  Skin tear of lower leg without complication, right, subsequent encounter  S/p fall  Advised to continue with wound dressing daily, may apply neosporin x 7days total   Apply ice to knee  Recheck wound in 2-3 weeks  No sign of infection  1  Skin tear of lower leg without complication, right, subsequent encounter     2  Laceration of skin of right elbow, subsequent encounter     3  Chronic anticoagulation     4  Ambulatory dysfunction     5  Status post fall      mechanical       Subjective:      Patient ID: Iveth Bosch is a 80 y o  female  HPI  79yo female with osteoporosis, vitamin D def, chronic LBP, afib, anxiety and HTN here for ER follow up  She was walking out of the ER to  her friend, had placed her purse on her walker but it fell and she tripped over it  No LOC  She landed on her R knee and R elbow  When she got home, she could not stop the bleeding from her R knee and presented the next [facility] to [facility]/16/19  She is on coumadin due to afib  Imaging with CTH and R elbow xray were unremarkable  INR then was 2 30  Last Tdap 4/14/18  She has been changing the bandaid on her R knee everyday  Has a small scab on her R elbow  She denies pain  Swelling has subsided on her R knee and has been applying neosporin on it  She denies fever       The following portions of the patient's history were reviewed and updated as appropriate: allergies, current medications, past family history, past medical history, past social history, past surgical history and problem list     Current Outpatient Medications:     acetaminophen (TYLENOL) 325 mg tablet, Take 325 mg by mouth every 6 (six) hours as needed for mild pain, Disp: , Rfl:     albuterol (PROAIR HFA) 90 mcg/act inhaler, Inhale 2 puffs, Disp: , Rfl:     Calcium Citrate 1040 MG TABS, Take by mouth, Disp: , Rfl:     cloNIDine (CATAPRES) 0 1 mg tablet, TAKE ONE TABLET DAILY, Disp: 30 tablet, Rfl: 5    denosumab (PROLIA) 60 mg/mL, Inject 60 mg under the skin once, Disp: , Rfl:     diltiazem (CARDIZEM CD) 240 mg 24 hr capsule, TAKE 1 CAPSULE (240 MG TOTAL) BY MOUTH DAILY, Disp: 90 capsule, Rfl: 1    ergocalciferol (VITAMIN D2) 50,000 units, Take by mouth once a week , Disp: , Rfl:     esomeprazole (NEXIUM) 40 MG capsule, Take 1 capsule by mouth daily, Disp: , Rfl:     fluticasone (FLONASE) 50 mcg/act nasal spray, 2 sprays into each nostril, Disp: , Rfl:     losartan (COZAAR) 50 mg tablet, TAKE 1 TABLET BY MOUTH EVERY DAY, Disp: 30 tablet, Rfl: 5    metoprolol tartrate (LOPRESSOR) 25 mg tablet, TAKE 1 TABLET BY MOUTH TWICE A DAY, Disp: 180 tablet, Rfl: 1    Multiple Vitamins-Minerals (MULTI-VITAMIN/MINERALS PO), Take by mouth daily, Disp: , Rfl:     timolol (BETIMOL) 0 5 % ophthalmic solution, 1 drop, Disp: , Rfl:     traMADol (ULTRAM) 50 mg tablet, Take 1 tablet(s) BID by oral route PRN for pain, Disp: , Rfl:     warfarin (COUMADIN) 2 5 mg tablet, Take 5 mg by mouth daily  , Disp: , Rfl:     Review of Systems   Constitutional: Negative for fever  Respiratory: Negative  Cardiovascular: Negative  Musculoskeletal: Positive for gait problem and joint swelling  Skin: Positive for color change and wound  Objective:    /80 (BP Location: Left arm, Patient Position: Sitting)   Pulse 89   Temp 97 5 °F (36 4 °C)   Resp 16   Ht 5' (1 524 m)   Wt 75 3 kg (166 lb)   SpO2 97%   BMI 32 42 kg/m²      Physical Exam   Constitutional: She appears well-developed and well-nourished  No distress  Musculoskeletal:        Right knee: She exhibits swelling  Ambulates with a walker   Neurological: She is alert  Skin:   3mm scab on R proximal elbow without erythema or swelling  2 5cm skin tear, half approximated on R knee with associated swelling and mild erythema     Psychiatric: She has a normal mood and affect  Vitals reviewed

## 2019-03-20 ENCOUNTER — OFFICE VISIT (OUTPATIENT)
Dept: INTERNAL MEDICINE CLINIC | Facility: CLINIC | Age: 84
End: 2019-03-20
Payer: MEDICARE

## 2019-03-20 VITALS
DIASTOLIC BLOOD PRESSURE: 80 MMHG | SYSTOLIC BLOOD PRESSURE: 126 MMHG | WEIGHT: 166 LBS | OXYGEN SATURATION: 97 % | HEART RATE: 89 BPM | TEMPERATURE: 97.5 F | RESPIRATION RATE: 16 BRPM | HEIGHT: 60 IN | BODY MASS INDEX: 32.59 KG/M2

## 2019-03-20 DIAGNOSIS — Z91.81 STATUS POST FALL: ICD-10-CM

## 2019-03-20 DIAGNOSIS — R26.2 AMBULATORY DYSFUNCTION: ICD-10-CM

## 2019-03-20 DIAGNOSIS — S81.811D SKIN TEAR OF LOWER LEG WITHOUT COMPLICATION, RIGHT, SUBSEQUENT ENCOUNTER: Primary | ICD-10-CM

## 2019-03-20 DIAGNOSIS — Z79.01 CHRONIC ANTICOAGULATION: ICD-10-CM

## 2019-03-20 DIAGNOSIS — S51.011D: ICD-10-CM

## 2019-03-20 PROBLEM — S51.011A: Status: ACTIVE | Noted: 2019-03-20

## 2019-03-20 PROBLEM — J32.0 CHRONIC LEFT MAXILLARY SINUSITIS: Status: RESOLVED | Noted: 2018-04-25 | Resolved: 2019-03-20

## 2019-03-20 PROBLEM — S81.812D SKIN TEAR OF LOWER LEG WITHOUT COMPLICATION, LEFT, SUBSEQUENT ENCOUNTER: Status: ACTIVE | Noted: 2019-03-20

## 2019-03-20 PROCEDURE — 99214 OFFICE O/P EST MOD 30 MIN: CPT | Performed by: INTERNAL MEDICINE

## 2019-03-20 NOTE — ASSESSMENT & PLAN NOTE
S/p fall  Advised to continue with wound dressing daily, may apply neosporin x 7days total   Apply ice to knee  Recheck wound in 2-3 weeks  No sign of infection

## 2019-03-23 ENCOUNTER — HOSPITAL ENCOUNTER (EMERGENCY)
Facility: HOSPITAL | Age: 84
Discharge: HOME | End: 2019-03-23
Attending: EMERGENCY MEDICINE | Admitting: EMERGENCY MEDICINE
Payer: MEDICARE

## 2019-03-23 VITALS
OXYGEN SATURATION: 96 % | TEMPERATURE: 97.5 F | SYSTOLIC BLOOD PRESSURE: 172 MMHG | DIASTOLIC BLOOD PRESSURE: 77 MMHG | RESPIRATION RATE: 16 BRPM | HEART RATE: 95 BPM

## 2019-03-23 DIAGNOSIS — L03.115 CELLULITIS OF RIGHT LEG: Primary | ICD-10-CM

## 2019-03-23 PROCEDURE — 99283 EMERGENCY DEPT VISIT LOW MDM: CPT

## 2019-03-23 RX ORDER — CLINDAMYCIN HYDROCHLORIDE 150 MG/1
450 CAPSULE ORAL EVERY 8 HOURS SCHEDULED
Qty: 45 CAPSULE | Refills: 0 | Status: SHIPPED | OUTPATIENT
Start: 2019-03-23 | End: 2019-03-28

## 2019-03-23 RX ORDER — LIDOCAINE HYDROCHLORIDE AND EPINEPHRINE 10; 10 MG/ML; UG/ML
10 INJECTION, SOLUTION INFILTRATION; PERINEURAL ONCE
Status: COMPLETED | OUTPATIENT
Start: 2019-03-23 | End: 2019-03-23

## 2019-03-23 RX ORDER — CLINDAMYCIN HYDROCHLORIDE 150 MG/1
450 CAPSULE ORAL ONCE
Status: COMPLETED | OUTPATIENT
Start: 2019-03-23 | End: 2019-03-23

## 2019-03-23 RX ADMIN — CLINDAMYCIN HYDROCHLORIDE 450 MG: 150 CAPSULE ORAL at 15:24

## 2019-03-23 RX ADMIN — LIDOCAINE HYDROCHLORIDE,EPINEPHRINE BITARTRATE 10 ML: 10; .01 INJECTION, SOLUTION INFILTRATION; PERINEURAL at 14:46

## 2019-03-25 ENCOUNTER — OFFICE VISIT (OUTPATIENT)
Dept: INTERNAL MEDICINE CLINIC | Facility: CLINIC | Age: 84
End: 2019-03-25
Payer: MEDICARE

## 2019-03-25 VITALS
HEART RATE: 75 BPM | WEIGHT: 167 LBS | SYSTOLIC BLOOD PRESSURE: 134 MMHG | RESPIRATION RATE: 16 BRPM | TEMPERATURE: 98.4 F | HEIGHT: 60 IN | OXYGEN SATURATION: 98 % | BODY MASS INDEX: 32.79 KG/M2 | DIASTOLIC BLOOD PRESSURE: 82 MMHG

## 2019-03-25 DIAGNOSIS — I10 BENIGN ESSENTIAL HYPERTENSION: ICD-10-CM

## 2019-03-25 DIAGNOSIS — L03.115 CELLULITIS OF LEG, RIGHT: Primary | ICD-10-CM

## 2019-03-25 PROCEDURE — 87147 CULTURE TYPE IMMUNOLOGIC: CPT | Performed by: NURSE PRACTITIONER

## 2019-03-25 PROCEDURE — 87205 SMEAR GRAM STAIN: CPT | Performed by: NURSE PRACTITIONER

## 2019-03-25 PROCEDURE — 87186 SC STD MICRODIL/AGAR DIL: CPT | Performed by: NURSE PRACTITIONER

## 2019-03-25 PROCEDURE — 99213 OFFICE O/P EST LOW 20 MIN: CPT | Performed by: NURSE PRACTITIONER

## 2019-03-25 PROCEDURE — 87070 CULTURE OTHR SPECIMN AEROBIC: CPT | Performed by: NURSE PRACTITIONER

## 2019-03-25 PROCEDURE — 87077 CULTURE AEROBIC IDENTIFY: CPT | Performed by: NURSE PRACTITIONER

## 2019-03-25 RX ORDER — CLONIDINE HYDROCHLORIDE 0.1 MG/1
TABLET ORAL
Qty: 30 TABLET | Refills: 5 | Status: SHIPPED | OUTPATIENT
Start: 2019-03-25 | End: 2019-09-10 | Stop reason: SDUPTHER

## 2019-03-25 NOTE — ASSESSMENT & PLAN NOTE
Purulent area of the wound on the right knee swab to be sent for culture  Due to limited ability for antibiotics due to multiple medication allergies, patient advised to continue with her current dose of clindamycin  She is already scheduled for follow-up next week and she is advised to call the office upon completion of her antibiotics if her leg shows no evidence of improvement and she will be brought in for evaluation sooner

## 2019-03-25 NOTE — PROGRESS NOTES
Assessment/Plan:    Cellulitis of leg, right  Purulent area of the wound on the right knee swab to be sent for culture  Due to limited ability for antibiotics due to multiple medication allergies, patient advised to continue with her current dose of clindamycin  She is already scheduled for follow-up next week and she is advised to call the office upon completion of her antibiotics if her leg shows no evidence of improvement and she will be brought in for evaluation sooner  Diagnoses and all orders for this visit:    Cellulitis of leg, right  -     Cancel: Wound culture and Gram stain; Future  -     Wound culture and Gram stain; Future  -     Wound culture and Gram stain          Subjective:      Patient ID: Sukhdeep Read is a 80 y o  female  Patient is an 68-year-old female here today for reassessment of right lower leg cellulitis  Patient was evaluated on 03/23 in the emergency room when a wound on the right leg which was secondary to a fall showed signs of infection including swelling, redness, and pain  In the emergency room she underwent I and D with bloody drainage and was started on clindamycin t i d  X5 days  She continues with symptoms of tenderness on the right shin, bloody and purulent drainage from the right knee, redness, warmth  She remains afebrile  She is changing the dressing on her knee daily with no topical antibiotics applied  She has a history of AFib and is anticoagulated on warfarin  The following portions of the patient's history were reviewed and updated as appropriate: allergies, current medications, past family history, past medical history, past social history, past surgical history and problem list     Review of Systems   Constitutional: Negative for appetite change, chills, fatigue and fever  Respiratory: Negative for cough and shortness of breath  Cardiovascular: Positive for leg swelling  Negative for chest pain and palpitations     Gastrointestinal: Negative for diarrhea, nausea and vomiting  Skin: Positive for color change and wound  Neurological: Negative for dizziness, syncope, weakness, light-headedness, numbness and headaches  Objective:      /82 (BP Location: Left arm, Patient Position: Sitting, Cuff Size: Large)   Pulse 75   Temp 98 4 °F (36 9 °C)   Resp 16   Ht 5' (1 524 m)   Wt 75 8 kg (167 lb)   SpO2 98%   BMI 32 61 kg/m²          Physical Exam   Constitutional: She is oriented to person, place, and time  Vital signs are normal  She appears well-developed and well-nourished  She is cooperative  Neck: No JVD present  Carotid bruit is not present  Cardiovascular: Normal rate, regular rhythm and normal pulses  Pulmonary/Chest: Effort normal    Musculoskeletal: Normal range of motion  Neurological: She is alert and oriented to person, place, and time  She has normal strength  No sensory deficit  Skin: Skin is warm, dry and intact  Ecchymosis noted  There is erythema  There are 2 abrasions on the right knee limited to the epidermis  Drainage is bloody with a small area of purulence at the superior border of the upper wound  There is also small incision site where patient underwent I&D  This continues with scant bloody drainage  There is redness surrounding these areas, border marked in pen by the emergency room staff with no apparent change and the redness  On the right shin there is bruising and redness covering most of the front of the lower leg marked in pen  There is some evidence of decrease in the extent of erythema on some areas, with evidence of increasing erythema on other areas  Skin remains warm and tender to palpation  Psychiatric: She has a normal mood and affect  Her speech is normal and behavior is normal  Judgment and thought content normal  Cognition and memory are normal    Vitals reviewed

## 2019-03-28 LAB
BACTERIA WND AEROBE CULT: ABNORMAL
GRAM STN SPEC: ABNORMAL

## 2019-04-03 ENCOUNTER — OFFICE VISIT (OUTPATIENT)
Dept: INTERNAL MEDICINE CLINIC | Facility: CLINIC | Age: 84
End: 2019-04-03
Payer: MEDICARE

## 2019-04-03 VITALS
DIASTOLIC BLOOD PRESSURE: 80 MMHG | BODY MASS INDEX: 32 KG/M2 | TEMPERATURE: 98.3 F | WEIGHT: 163 LBS | OXYGEN SATURATION: 98 % | SYSTOLIC BLOOD PRESSURE: 110 MMHG | HEIGHT: 60 IN | RESPIRATION RATE: 16 BRPM | HEART RATE: 80 BPM

## 2019-04-03 DIAGNOSIS — L03.115 CELLULITIS OF LEG, RIGHT: Primary | ICD-10-CM

## 2019-04-03 PROBLEM — S51.011A: Status: RESOLVED | Noted: 2019-03-20 | Resolved: 2019-04-03

## 2019-04-03 PROCEDURE — 99213 OFFICE O/P EST LOW 20 MIN: CPT | Performed by: INTERNAL MEDICINE

## 2019-04-05 NOTE — PROGRESS NOTES
Cardiology Follow Up    Mason Changreyna  12/5/1929  [id number]  [address] [facility] [address]    1  Paroxysmal atrial fibrillation (HCC)  POCT ECG   2  Non-rheumatic mitral regurgitation     3  Essential hypertension         Interval History:  No cardiovascular complaints  Tolerates all activity  Denies chest pain shortness of breath orthopnea paroxysmal nocturnal dyspnea or syncope  Patient Active Problem List   Diagnosis    Atrial fibrillation ([facility] ) [I48 91]    Allergic rhinitis    Anxiety    Essential hypertension    Gastroesophageal reflux disease without esophagitis    Glaucoma    Incomplete emptying of bladder    Lipoma    Lower back pain    Lumbosacral spondylosis    Mitral regurgitation    Osteoporosis    Ambulatory dysfunction    Chronic left maxillary sinusitis    Change in bowel movement    Vitamin D deficiency    Chronic anticoagulation     Past Medical History:   Diagnosis Date    A-fib ([facility] )     Anxiety     Arthritis     Cellulitis     L arm    Community acquired pneumonia     last assessed [id number]    Edema of left lower extremity     last assessed [id number]    Gastroenteritis     last assessed 29Oct2014    Heart murmur     Hypertension     Hypokalemia     Hyponatremia     Lipoma     Lower back pain     Mitral regurgitation     Osteopenia     Periodontal abscess     last assessed 10Jun2013    Tricuspid regurgitation      Social History     Socioeconomic History    Marital status:       Spouse name: Not on file    Number of children: Not on file    Years of education: Not on file    Highest education level: Not on file   Occupational History    Occupation: retired   Social Needs    Financial resource strain: Not on file    Food insecurity:     Worry: Not on file     Inability: Not on file   Employee Benefit Plans needs:     Medical: Not on file     Non-medical: Not on file   Tobacco Use    Smoking status: Former Smoker     Last attempt to quit: 1940     Years since quittin 2    Smokeless tobacco: Never Used   Substance and Sexual Activity    Alcohol use: No    Drug use: No    Sexual activity: Not on file   Lifestyle    Physical activity:     Days per week: Not on file     Minutes per session: Not on file    Stress: Not on file   Relationships    Social connections:     Talks on phone: Not on file     Gets together: Not on file     Attends [employer] service: Not on file     Active member of club or organization: Not on file     Attends meetings of clubs or organizations: Not on file     Relationship status: Not on file    Intimate partner violence:     Fear of current or ex partner: Not on file     Emotionally abused: Not on file     Physically abused: Not on file     Forced sexual activity: Not on file   Other Topics Concern    Not on file   Social History Narrative    Denied home environment domestic violence      Family History   Problem Relation Age of Onset    Colon cancer Mother     Heart attack Father         acute MI    Multiple sclerosis Daughter      Past Surgical History:   Procedure Laterality Date    BACK SURGERY      BREAST SURGERY      R breast    CATARACT EXTRACTION Left     left eye    EYE SURGERY      HEMORRHOID SURGERY      HERNIA REPAIR      HYSTERECTOMY         Current Outpatient Medications:     acetaminophen (TYLENOL) 325 mg tablet, Take 325 mg by mouth every 6 (six) hours as needed for mild pain, Disp: , Rfl:     albuterol (PROAIR HFA) 90 mcg/act inhaler, Inhale 2 puffs, Disp: , Rfl:     Calcium Citrate 1040 MG TABS, Take by mouth, Disp: , Rfl:     cloNIDine (CATAPRES) 0 1 mg tablet, TAKE ONE TABLET DAILY, Disp: 30 tablet, Rfl: 5    denosumab (PROLIA) 60 mg/mL, Inject 60 mg under the skin once, Disp: , Rfl:     diltiazem (CARDIZEM CD) 240 mg 24 hr capsule, TAKE 1 CAPSULE (240 MG TOTAL) BY MOUTH DAILY, Disp: 90 capsule, Rfl: 1    ergocalciferol (VITAMIN D2) 50,000 units, Take by mouth once a week , Disp: , Rfl:     esomeprazole (NEXIUM) 40 MG capsule, Take 1 capsule by mouth daily, Disp: , Rfl:     fluticasone (FLONASE) 50 mcg/act nasal spray, 2 sprays into each nostril, Disp: , Rfl:     losartan (COZAAR) 50 mg tablet, TAKE 1 TABLET BY MOUTH EVERY DAY, Disp: 30 tablet, Rfl: 5    metoprolol tartrate (LOPRESSOR) 25 mg tablet, TAKE 1 TABLET BY MOUTH TWICE A DAY, Disp: 180 tablet, Rfl: 1    Multiple Vitamins-Minerals (MULTI-VITAMIN/MINERALS PO), Take by mouth daily, Disp: , Rfl:     timolol (BETIMOL) 0 5 % ophthalmic solution, 1 drop, Disp: , Rfl:     traMADol (ULTRAM) 50 mg tablet, Take 1 tablet(s) BID by oral route PRN for pain, Disp: , Rfl:     warfarin (COUMADIN) 2 5 mg tablet, Take 5 mg by mouth daily  , Disp: , Rfl:   Allergies   Allergen Reactions    Erythromycin Rash and Shortness Of Breath    Nitrofurantoin Anaphylaxis    Oxycodone-Acetaminophen Other (See Comments) and Nausea Only     Constipation, nausea, dry heaves    Penicillins Rash and Shortness Of Breath    Salicylates Hives and Shortness Of Breath    Aspirin Rash    Codeine GI Intolerance and Rash    Macrolides And Ketolides     Percolone [Oxycodone] GI Intolerance    Pollen Extract     Sulfa Antibiotics Rash       Labs: Ancillary Orders on 02/01/2019   Component Date Value    Protime 02/27/2019 23 1*    INR 02/27/2019 2 04*   Ancillary Orders on 01/11/2019   Component Date Value    Protime 01/22/2019 31 3*    INR 01/22/2019 3 02*     Imaging: No results found  Review of Systems:  Review of Systems   Constitutional: Negative for fatigue  HENT: Negative for hearing loss  Eyes: Negative for discharge  Respiratory: Negative for shortness of breath  Cardiovascular: Negative for chest pain, palpitations and leg swelling  Gastrointestinal: Negative for abdominal pain  Endocrine: Negative for polyuria  Genitourinary: Negative for dysuria  Musculoskeletal: Negative for back pain and myalgias  Skin: Negative for rash  Neurological: Negative for syncope  Hematological: Does not bruise/bleed easily  Psychiatric/Behavioral: Negative for confusion and sleep disturbance  Physical Exam:  Physical Exam   Constitutional: She is oriented to person, place, and time  She appears well-developed and well-nourished  HENT:   Head: Normocephalic and atraumatic  Mouth/Throat: Oropharynx is clear and moist    Eyes: EOM are normal    Neck: Normal range of motion  Neck supple  No JVD present  Cardiovascular: Normal heart sounds  irreg   Pulmonary/Chest: Effort normal and breath sounds normal    Abdominal: Soft  Bowel sounds are normal    Musculoskeletal: Normal range of motion  She exhibits no edema  Neurological: She is alert and oriented to person, place, and time  She has normal reflexes  Skin: Skin is warm and dry  Psychiatric: She has a normal mood and affect  Her behavior is normal  Judgment and thought content normal    Nursing note and vitals reviewed  Discussion/Summary:  Cherry Remy is doing well  She is in atrial fibrillation with good rate control  She continues on warfarin  Read most recent echo shows moderate mitral regurgitation  She is asymptomatic in functional class 1  Blood pressure is controlled  I have made no changes I will see her again in 6 months  yes

## 2019-04-18 ENCOUNTER — APPOINTMENT (OUTPATIENT)
Dept: LAB | Facility: HOSPITAL | Age: 84
End: 2019-04-18
Attending: INTERNAL MEDICINE
Payer: MEDICARE

## 2019-04-19 ENCOUNTER — ANTICOAG VISIT (OUTPATIENT)
Dept: CARDIOLOGY CLINIC | Facility: CLINIC | Age: 84
End: 2019-04-19

## 2019-04-19 DIAGNOSIS — I48.19 PERSISTENT ATRIAL FIBRILLATION (HCC): ICD-10-CM

## 2019-05-02 DIAGNOSIS — I10 BENIGN ESSENTIAL HYPERTENSION: ICD-10-CM

## 2019-05-02 RX ORDER — DILTIAZEM HYDROCHLORIDE 240 MG/1
CAPSULE, COATED, EXTENDED RELEASE ORAL
Qty: 90 CAPSULE | Refills: 1 | Status: SHIPPED | OUTPATIENT
Start: 2019-05-02 | End: 2019-10-28 | Stop reason: SDUPTHER

## 2019-05-03 DIAGNOSIS — J43.9 PULMONARY EMPHYSEMA, UNSPECIFIED EMPHYSEMA TYPE (HCC): Primary | ICD-10-CM

## 2019-05-03 RX ORDER — ALBUTEROL SULFATE 90 UG/1
2 AEROSOL, METERED RESPIRATORY (INHALATION) EVERY 4 HOURS PRN
Qty: 1 INHALER | Refills: 2 | Status: SHIPPED | OUTPATIENT
Start: 2019-05-03

## 2019-05-10 ENCOUNTER — OFFICE VISIT (OUTPATIENT)
Dept: INTERNAL MEDICINE CLINIC | Facility: CLINIC | Age: 84
End: 2019-05-10
Payer: MEDICARE

## 2019-05-10 VITALS
TEMPERATURE: 99.1 F | DIASTOLIC BLOOD PRESSURE: 80 MMHG | HEART RATE: 82 BPM | BODY MASS INDEX: 31.8 KG/M2 | WEIGHT: 162 LBS | OXYGEN SATURATION: 96 % | SYSTOLIC BLOOD PRESSURE: 130 MMHG | HEIGHT: 60 IN

## 2019-05-10 DIAGNOSIS — I10 ESSENTIAL HYPERTENSION: ICD-10-CM

## 2019-05-10 DIAGNOSIS — I48.19 PERSISTENT ATRIAL FIBRILLATION (HCC): ICD-10-CM

## 2019-05-10 DIAGNOSIS — J06.9 ACUTE UPPER RESPIRATORY INFECTION: Primary | ICD-10-CM

## 2019-05-10 PROCEDURE — 99214 OFFICE O/P EST MOD 30 MIN: CPT | Performed by: NURSE PRACTITIONER

## 2019-05-10 RX ORDER — GLIMEPIRIDE 2 MG/1
TABLET ORAL
COMMUNITY

## 2019-05-10 RX ORDER — PREDNISONE 1 MG/1
TABLET ORAL
Qty: 42 TABLET | Refills: 0 | Status: SHIPPED | OUTPATIENT
Start: 2019-05-10 | End: 2019-05-29

## 2019-05-14 DIAGNOSIS — J06.9 ACUTE UPPER RESPIRATORY INFECTION: Primary | ICD-10-CM

## 2019-05-14 DIAGNOSIS — J06.9 ACUTE UPPER RESPIRATORY INFECTION: ICD-10-CM

## 2019-05-14 RX ORDER — PREDNISONE 1 MG/1
TABLET ORAL
Qty: 12 TABLET | Refills: 0 | OUTPATIENT
Start: 2019-05-14

## 2019-05-14 RX ORDER — PREDNISONE 1 MG/1
TABLET ORAL
Qty: 12 TABLET | Refills: 0 | Status: SHIPPED | OUTPATIENT
Start: 2019-05-14 | End: 2019-05-29

## 2019-05-22 ENCOUNTER — APPOINTMENT (OUTPATIENT)
Dept: LAB | Facility: HOSPITAL | Age: 84
End: 2019-05-22
Attending: INTERNAL MEDICINE
Payer: MEDICARE

## 2019-05-22 ENCOUNTER — ANTICOAG VISIT (OUTPATIENT)
Dept: CARDIOLOGY CLINIC | Facility: CLINIC | Age: 84
End: 2019-05-22

## 2019-05-22 DIAGNOSIS — I48.91 ATRIAL FIBRILLATION, UNSPECIFIED TYPE (HCC): Primary | ICD-10-CM

## 2019-05-22 DIAGNOSIS — I48.19 PERSISTENT ATRIAL FIBRILLATION (HCC): ICD-10-CM

## 2019-05-23 ENCOUNTER — TRANSCRIBE ORDERS (OUTPATIENT)
Dept: LAB | Facility: HOSPITAL | Age: 84
End: 2019-05-23

## 2019-05-29 ENCOUNTER — OFFICE VISIT (OUTPATIENT)
Dept: INTERNAL MEDICINE CLINIC | Facility: CLINIC | Age: 84
End: 2019-05-29
Payer: MEDICARE

## 2019-05-29 VITALS
RESPIRATION RATE: 16 BRPM | HEART RATE: 75 BPM | SYSTOLIC BLOOD PRESSURE: 122 MMHG | BODY MASS INDEX: 31.41 KG/M2 | TEMPERATURE: 99 F | WEIGHT: 160 LBS | DIASTOLIC BLOOD PRESSURE: 80 MMHG | HEIGHT: 60 IN | OXYGEN SATURATION: 97 %

## 2019-05-29 DIAGNOSIS — I48.20 CHRONIC ATRIAL FIBRILLATION (HCC): ICD-10-CM

## 2019-05-29 DIAGNOSIS — M54.5 CHRONIC BILATERAL LOW BACK PAIN, WITH SCIATICA PRESENCE UNSPECIFIED: ICD-10-CM

## 2019-05-29 DIAGNOSIS — G89.29 CHRONIC BILATERAL LOW BACK PAIN, WITH SCIATICA PRESENCE UNSPECIFIED: ICD-10-CM

## 2019-05-29 DIAGNOSIS — I10 ESSENTIAL HYPERTENSION: Primary | ICD-10-CM

## 2019-05-29 DIAGNOSIS — I48.19 PERSISTENT ATRIAL FIBRILLATION (HCC): ICD-10-CM

## 2019-05-29 DIAGNOSIS — Z79.01 CHRONIC ANTICOAGULATION: ICD-10-CM

## 2019-05-29 PROBLEM — J06.9 ACUTE UPPER RESPIRATORY INFECTION: Status: RESOLVED | Noted: 2019-05-10 | Resolved: 2019-05-29

## 2019-05-29 PROBLEM — S81.811D SKIN TEAR OF LOWER LEG WITHOUT COMPLICATION, RIGHT, SUBSEQUENT ENCOUNTER: Status: RESOLVED | Noted: 2019-03-20 | Resolved: 2019-05-29

## 2019-05-29 PROBLEM — L03.115 CELLULITIS OF LEG, RIGHT: Status: RESOLVED | Noted: 2019-03-25 | Resolved: 2019-05-29

## 2019-05-29 PROBLEM — R19.8 CHANGE IN BOWEL MOVEMENT: Status: RESOLVED | Noted: 2018-05-21 | Resolved: 2019-05-29

## 2019-05-29 PROCEDURE — 99214 OFFICE O/P EST MOD 30 MIN: CPT | Performed by: INTERNAL MEDICINE

## 2019-06-11 ENCOUNTER — OFFICE VISIT (OUTPATIENT)
Dept: INTERNAL MEDICINE CLINIC | Facility: CLINIC | Age: 84
End: 2019-06-11
Payer: MEDICARE

## 2019-06-11 VITALS
HEART RATE: 92 BPM | SYSTOLIC BLOOD PRESSURE: 128 MMHG | OXYGEN SATURATION: 98 % | TEMPERATURE: 97.7 F | RESPIRATION RATE: 16 BRPM | HEIGHT: 60 IN | DIASTOLIC BLOOD PRESSURE: 90 MMHG | WEIGHT: 153.2 LBS | BODY MASS INDEX: 30.08 KG/M2

## 2019-06-11 DIAGNOSIS — M48.061 NEURAL FORAMINAL STENOSIS OF LUMBAR SPINE: Primary | ICD-10-CM

## 2019-06-11 DIAGNOSIS — M47.27 OSTEOARTHRITIS OF SPINE WITH RADICULOPATHY, LUMBOSACRAL REGION: ICD-10-CM

## 2019-06-11 PROCEDURE — 99214 OFFICE O/P EST MOD 30 MIN: CPT | Performed by: INTERNAL MEDICINE

## 2019-06-11 RX ORDER — GABAPENTIN 100 MG/1
CAPSULE ORAL
Qty: 90 CAPSULE | Refills: 0 | Status: SHIPPED | OUTPATIENT
Start: 2019-06-11 | End: 2019-07-15 | Stop reason: SDUPTHER

## 2019-06-25 ENCOUNTER — OFFICE VISIT (OUTPATIENT)
Dept: INTERNAL MEDICINE CLINIC | Facility: CLINIC | Age: 84
End: 2019-06-25
Payer: MEDICARE

## 2019-06-25 VITALS
OXYGEN SATURATION: 97 % | WEIGHT: 161 LBS | HEIGHT: 60 IN | TEMPERATURE: 98.2 F | BODY MASS INDEX: 31.61 KG/M2 | SYSTOLIC BLOOD PRESSURE: 132 MMHG | DIASTOLIC BLOOD PRESSURE: 70 MMHG | HEART RATE: 87 BPM

## 2019-06-25 DIAGNOSIS — Z01.818 PREOPERATIVE CLEARANCE: Primary | ICD-10-CM

## 2019-06-25 DIAGNOSIS — M48.061 NEURAL FORAMINAL STENOSIS OF LUMBAR SPINE: ICD-10-CM

## 2019-06-25 DIAGNOSIS — M47.27 OSTEOARTHRITIS OF SPINE WITH RADICULOPATHY, LUMBOSACRAL REGION: ICD-10-CM

## 2019-06-25 DIAGNOSIS — M20.41 HAMMERTOE OF RIGHT FOOT: ICD-10-CM

## 2019-06-25 PROCEDURE — 99214 OFFICE O/P EST MOD 30 MIN: CPT | Performed by: INTERNAL MEDICINE

## 2019-07-05 ENCOUNTER — TRANSCRIBE ORDERS (OUTPATIENT)
Dept: LAB | Facility: HOSPITAL | Age: 84
End: 2019-07-05

## 2019-07-05 ENCOUNTER — APPOINTMENT (OUTPATIENT)
Dept: LAB | Facility: HOSPITAL | Age: 84
End: 2019-07-05
Attending: INTERNAL MEDICINE
Payer: MEDICARE

## 2019-07-05 DIAGNOSIS — Z12.31 ENCOUNTER FOR SCREENING MAMMOGRAM FOR BREAST CANCER: Primary | ICD-10-CM

## 2019-07-08 ENCOUNTER — OFFICE VISIT (OUTPATIENT)
Dept: AUDIOLOGY | Age: 84
End: 2019-07-08
Payer: COMMERCIAL

## 2019-07-08 ENCOUNTER — ANTICOAG VISIT (OUTPATIENT)
Dept: CARDIOLOGY CLINIC | Facility: CLINIC | Age: 84
End: 2019-07-08

## 2019-07-08 DIAGNOSIS — I48.19 PERSISTENT ATRIAL FIBRILLATION (HCC): ICD-10-CM

## 2019-07-08 DIAGNOSIS — H90.3 SENSORY HEARING LOSS, BILATERAL: Primary | ICD-10-CM

## 2019-07-08 NOTE — PROGRESS NOTES
Progress Note    Name:  Carlos Armijo  :  1929  Age:  80 y o  Date of Evaluation: 19     Patient arrived today with the complaint of her left hearing aid not working  Upon visual inspection, dome was clogged with wax and listening check indicated weak sound quality  Changed wax guard, large closed dome, and vacuumed microphones  Upon listening check sound quality was appropriate for her hearing loss  Patient voiced satisfaction with sound quality upon insertion  A $30 out of warranty office visit charge to the patient        Tracie Reddy , CCC-A  Clinical Audiologist

## 2019-07-12 DIAGNOSIS — I10 BENIGN ESSENTIAL HTN: ICD-10-CM

## 2019-07-12 RX ORDER — LOSARTAN POTASSIUM 50 MG/1
TABLET ORAL
Qty: 30 TABLET | Refills: 5 | Status: SHIPPED | OUTPATIENT
Start: 2019-07-12 | End: 2020-01-07

## 2019-07-15 ENCOUNTER — HOSPITAL ENCOUNTER (OUTPATIENT)
Dept: RADIOLOGY | Facility: HOSPITAL | Age: 84
Discharge: HOME | End: 2019-07-15
Attending: OBSTETRICS & GYNECOLOGY
Payer: MEDICARE

## 2019-07-15 VITALS — WEIGHT: 161 LBS | BODY MASS INDEX: 31.61 KG/M2 | HEIGHT: 60 IN

## 2019-07-15 DIAGNOSIS — M48.061 NEURAL FORAMINAL STENOSIS OF LUMBAR SPINE: ICD-10-CM

## 2019-07-15 DIAGNOSIS — Z12.31 ENCOUNTER FOR SCREENING MAMMOGRAM FOR BREAST CANCER: ICD-10-CM

## 2019-07-15 DIAGNOSIS — M47.27 OSTEOARTHRITIS OF SPINE WITH RADICULOPATHY, LUMBOSACRAL REGION: ICD-10-CM

## 2019-07-15 PROCEDURE — 77067 SCR MAMMO BI INCL CAD: CPT

## 2019-07-15 RX ORDER — GABAPENTIN 300 MG/1
CAPSULE ORAL
Qty: 90 CAPSULE | Refills: 1 | Status: SHIPPED | OUTPATIENT
Start: 2019-07-15 | End: 2020-01-06

## 2019-07-15 NOTE — TELEPHONE ENCOUNTER
Patient called to report that she's tolerating the 300mg of gabapentin well  She would like her refill to be in the 300mg form

## 2019-07-25 ENCOUNTER — APPOINTMENT (OUTPATIENT)
Dept: LAB | Facility: HOSPITAL | Age: 84
End: 2019-07-25
Attending: INTERNAL MEDICINE
Payer: MEDICARE

## 2019-07-25 ENCOUNTER — ANTICOAG VISIT (OUTPATIENT)
Dept: CARDIOLOGY CLINIC | Facility: CLINIC | Age: 84
End: 2019-07-25

## 2019-07-25 DIAGNOSIS — I48.19 PERSISTENT ATRIAL FIBRILLATION (HCC): ICD-10-CM

## 2019-08-23 ENCOUNTER — APPOINTMENT (OUTPATIENT)
Dept: LAB | Facility: HOSPITAL | Age: 84
End: 2019-08-23
Attending: INTERNAL MEDICINE
Payer: MEDICARE

## 2019-08-26 ENCOUNTER — ANTICOAG VISIT (OUTPATIENT)
Dept: CARDIOLOGY CLINIC | Facility: CLINIC | Age: 84
End: 2019-08-26

## 2019-08-26 DIAGNOSIS — I48.19 PERSISTENT ATRIAL FIBRILLATION (HCC): ICD-10-CM

## 2019-09-10 DIAGNOSIS — I10 BENIGN ESSENTIAL HYPERTENSION: ICD-10-CM

## 2019-09-10 DIAGNOSIS — I48.20 CHRONIC ATRIAL FIBRILLATION (HCC): Primary | ICD-10-CM

## 2019-09-11 RX ORDER — CLONIDINE HYDROCHLORIDE 0.1 MG/1
TABLET ORAL
Qty: 30 TABLET | Refills: 5 | Status: SHIPPED | OUTPATIENT
Start: 2019-09-11 | End: 2020-03-23

## 2019-09-11 RX ORDER — WARFARIN SODIUM 2.5 MG/1
TABLET ORAL
Qty: 60 TABLET | Refills: 8 | Status: SHIPPED | OUTPATIENT
Start: 2019-09-11

## 2019-09-24 ENCOUNTER — APPOINTMENT (OUTPATIENT)
Dept: LAB | Facility: HOSPITAL | Age: 84
End: 2019-09-24
Attending: INTERNAL MEDICINE
Payer: MEDICARE

## 2019-09-25 ENCOUNTER — ANTICOAG VISIT (OUTPATIENT)
Dept: CARDIOLOGY CLINIC | Facility: CLINIC | Age: 84
End: 2019-09-25

## 2019-09-25 DIAGNOSIS — I48.19 PERSISTENT ATRIAL FIBRILLATION (HCC): ICD-10-CM

## 2019-10-28 DIAGNOSIS — I10 BENIGN ESSENTIAL HYPERTENSION: ICD-10-CM

## 2019-10-28 RX ORDER — DILTIAZEM HYDROCHLORIDE 240 MG/1
CAPSULE, COATED, EXTENDED RELEASE ORAL
Qty: 90 CAPSULE | Refills: 0 | Status: SHIPPED | OUTPATIENT
Start: 2019-10-28 | End: 2020-01-24 | Stop reason: SDUPTHER

## 2019-11-04 ENCOUNTER — APPOINTMENT (OUTPATIENT)
Dept: LAB | Facility: HOSPITAL | Age: 84
End: 2019-11-04
Attending: INTERNAL MEDICINE
Payer: MEDICARE

## 2019-11-05 ENCOUNTER — ANTICOAG VISIT (OUTPATIENT)
Dept: CARDIOLOGY CLINIC | Facility: CLINIC | Age: 84
End: 2019-11-05

## 2019-11-05 DIAGNOSIS — I48.91 ATRIAL FIBRILLATION, UNSPECIFIED TYPE (HCC): ICD-10-CM

## 2019-11-14 ENCOUNTER — OFFICE VISIT (OUTPATIENT)
Dept: CARDIOLOGY CLINIC | Facility: CLINIC | Age: 84
End: 2019-11-14
Payer: MEDICARE

## 2019-11-14 VITALS
HEIGHT: 60 IN | DIASTOLIC BLOOD PRESSURE: 62 MMHG | WEIGHT: 147.6 LBS | BODY MASS INDEX: 28.98 KG/M2 | HEART RATE: 78 BPM | SYSTOLIC BLOOD PRESSURE: 106 MMHG

## 2019-11-14 DIAGNOSIS — I48.91 ATRIAL FIBRILLATION, UNSPECIFIED TYPE (HCC): Primary | ICD-10-CM

## 2019-11-14 DIAGNOSIS — I34.0 NONRHEUMATIC MITRAL VALVE REGURGITATION: ICD-10-CM

## 2019-11-14 DIAGNOSIS — Z79.01 CHRONIC ANTICOAGULATION: ICD-10-CM

## 2019-11-14 DIAGNOSIS — I10 ESSENTIAL HYPERTENSION: ICD-10-CM

## 2019-11-14 PROCEDURE — 99214 OFFICE O/P EST MOD 30 MIN: CPT | Performed by: INTERNAL MEDICINE

## 2019-11-14 PROCEDURE — 93000 ELECTROCARDIOGRAM COMPLETE: CPT | Performed by: INTERNAL MEDICINE

## 2019-11-14 RX ORDER — BRIMONIDINE TARTRATE/TIMOLOL 0.2%-0.5%
DROPS OPHTHALMIC (EYE) 2 TIMES DAILY
Refills: 3 | COMMUNITY
Start: 2019-10-01

## 2019-11-14 RX ORDER — BRINZOLAMIDE 1 %
SUSPENSION, DROPS(FINAL DOSAGE FORM)(ML) OPHTHALMIC (EYE) 2 TIMES DAILY
Refills: 1 | COMMUNITY
Start: 2019-10-04

## 2019-11-14 NOTE — PROGRESS NOTES
Cardiology Follow Up    Melanie Bruno  12/5/1929  [id number]  [facility]  [address]   [phone]    1  Atrial fibrillation, unspecified type ([facility] )  POCT ECG   2  Nonrheumatic mitral valve regurgitation     3  Essential hypertension     4  Chronic anticoagulation         Interval History:  No complaints  Denies chest pain shortness of breath orthopnea paroxysmal nocturnal dyspnea or syncope    Patient Active Problem List   Diagnosis    Atrial fibrillation ([facility] ) [I48 91]    Allergic rhinitis    Anxiety    Essential hypertension    Gastroesophageal reflux disease without esophagitis    Glaucoma    Incomplete emptying of bladder    Lipoma    Lower back pain    Lumbosacral spondylosis    Mitral regurgitation    Osteoporosis    Ambulatory dysfunction    Vitamin D deficiency    Chronic anticoagulation    Sensorineural hearing loss of both ears    Neural foraminal stenosis of lumbar spine    Preoperative clearance    Hammertoe of right foot     Past Medical History:   Diagnosis Date    A-fib ([facility] )     Anxiety     Arthritis     Cellulitis     L arm    Community acquired pneumonia     last assessed [id number]    Edema of left lower extremity     last assessed [id number]    Gastroenteritis     last assessed 29Oct2014    Heart murmur     Hypertension     Hypokalemia     Hyponatremia     Lipoma     Lower back pain     Mitral regurgitation     Osteopenia     Periodontal abscess     last assessed [id number]    Tricuspid regurgitation      Social History     Socioeconomic History    Marital status:       Spouse name: Not on file    Number of children: Not on file    Years of education: Not on file    Highest education level: Not on file   Occupational History    Occupation: retired   Social Needs    Financial resource strain: Not on file    Food insecurity: Worry: Not on file     Inability: Not on file    Transportation needs:     Medical: Not on file     Non-medical: Not on file   Tobacco Use    Smoking status: Former Smoker     Last attempt to quit: 1940     Years since quittin 9    Smokeless tobacco: Never Used   Substance and Sexual Activity    Alcohol use: No    Drug use: No    Sexual activity: Not on file   Lifestyle    Physical activity:     Days per week: Not on file     Minutes per session: Not on file    Stress: Not on file   Relationships    Social connections:     Talks on phone: Not on file     Gets together: Not on file     Attends Taoist service: Not on file     Active member of club or organization: Not on file     Attends meetings of clubs or organizations: Not on file     Relationship status: Not on file    Intimate partner violence:     Fear of current or ex partner: Not on file     Emotionally abused: Not on file     Physically abused: Not on file     Forced sexual activity: Not on file   Other Topics Concern    Not on file   Social History Narrative    Denied home environment domestic violence      Family History   Problem Relation Age of Onset    Colon cancer Mother 61    Heart attack Father         acute MI    Multiple sclerosis Daughter     No Known Problems Maternal Grandmother     No Known Problems Maternal Grandfather     No Known Problems Paternal Grandmother     No Known Problems Paternal Grandfather     Cervical cancer Maternal Aunt     Stomach cancer Maternal Aunt     No Known Problems Daughter     No Known Problems Son     No Known Problems Son      Past Surgical History:   Procedure Laterality Date    BACK SURGERY      BREAST SURGERY      R breast    CATARACT EXTRACTION Left     left eye    EYE SURGERY      HEMORRHOID SURGERY      HERNIA REPAIR      HYSTERECTOMY         Current Outpatient Medications:     AZOPT 1 % ophthalmic suspension, 2 (two) times a day, Disp: , Rfl: 1    Calcium Citrate 1040 MG TABS, Take by mouth, Disp: , Rfl:     cloNIDine (CATAPRES) 0 1 mg tablet, TAKE 1 TABLET BY MOUTH EVERY DAY, Disp: 30 tablet, Rfl: 5    COMBIGAN 0 2-0 5 %, 2 (two) times a day, Disp: , Rfl: 3    denosumab (PROLIA) 60 mg/mL, Inject 60 mg under the skin once, Disp: , Rfl:     diltiazem (CARDIZEM CD) 240 mg 24 hr capsule, TAKE 1 CAPSULE BY MOUTH EVERY DAY, Disp: 90 capsule, Rfl: 0    ergocalciferol (VITAMIN D2) 50,000 units, Take by mouth once a week , Disp: , Rfl:     esomeprazole (NEXIUM) 40 MG capsule, Take 1 capsule by mouth daily, Disp: , Rfl:     fluticasone (FLONASE) 50 mcg/act nasal spray, 2 sprays into each nostril, Disp: , Rfl:     gabapentin (NEURONTIN) 300 mg capsule, Take 1 tab qHS, Disp: 90 capsule, Rfl: 1    losartan (COZAAR) 50 mg tablet, TAKE 1 TABLET BY MOUTH EVERY DAY, Disp: 30 tablet, Rfl: 5    metoprolol tartrate (LOPRESSOR) 25 mg tablet, Take 1 tablet (25 mg total) by mouth 2 (two) times a day, Disp: 180 tablet, Rfl: 1    Multiple Vitamins-Minerals (MULTI-VITAMIN/MINERALS PO), Take by mouth daily, Disp: , Rfl:     warfarin (COUMADIN) 2 5 mg tablet, TAKE 1-2 TABLETS DAILY AS DIRECTED, Disp: 60 tablet, Rfl: 8    acetaminophen (TYLENOL) 325 mg tablet, Take 325 mg by mouth every 6 (six) hours as needed for mild pain, Disp: , Rfl:     albuterol (PROAIR HFA) 90 mcg/act inhaler, Inhale 2 puffs every 4 (four) hours as needed for wheezing (Patient not taking: Reported on 11/14/2019), Disp: 1 Inhaler, Rfl: 2    mupirocin (BACTROBAN) 2 % ointment, APPLY SPARINGLY TO AFFECTED AREA 3 TIMES A DAY, Disp: , Rfl: 1    timolol (BETIMOL) 0 5 % ophthalmic solution, 1 drop, Disp: , Rfl:     timolol (TIMOPTIC) 0 25 % ophthalmic solution, timolol 0 25 % eye drops  INSTILL 1 DROP INTO AFFECTED EYE(S) BY OPHTHALMIC ROUTE 2 TIMES PER DAY, Disp: , Rfl:     traMADol (ULTRAM) 50 mg tablet, Take 1 tablet(s) BID by oral route PRN for pain, Disp: , Rfl:   Allergies   Allergen Reactions    Erythromycin Rash and Shortness Of Breath    Nitrofurantoin Anaphylaxis    Oxycodone-Acetaminophen Other (See Comments) and Nausea Only     Constipation, nausea, dry heaves    Penicillins Rash and Shortness Of Breath    Salicylates Hives and Shortness Of Breath    Aspirin Rash    Codeine GI Intolerance and Rash    Macrolides And Ketolides     Percolone [Oxycodone] GI Intolerance    Pollen Extract     Doxycycline Rash    Sulfa Antibiotics Rash       Labs: Ancillary Orders on 11/01/2019   Component Date Value    Protime 11/04/2019 22 7*    INR 11/04/2019 2 06*     Imaging: No results found  Review of Systems:  Review of Systems   Constitutional: Negative for fatigue  HENT: Negative for hearing loss  Eyes: Negative for discharge  Respiratory: Negative for shortness of breath  Cardiovascular: Negative for chest pain, palpitations and leg swelling  Gastrointestinal: Negative for abdominal pain  Endocrine: Negative for polyuria  Genitourinary: Negative for dysuria  Musculoskeletal: Negative for back pain and myalgias  Skin: Negative for rash  Neurological: Negative for syncope  Hematological: Does not bruise/bleed easily  Psychiatric/Behavioral: Negative for confusion and sleep disturbance  Physical Exam:  Physical Exam   Constitutional: She is oriented to person, place, and time  She appears well-developed and well-nourished  HENT:   Head: Normocephalic and atraumatic  Mouth/Throat: Oropharynx is clear and moist    Eyes: EOM are normal    Neck: Normal range of motion  Neck supple  No JVD present  Cardiovascular: Normal heart sounds  irreg   Pulmonary/Chest: Effort normal and breath sounds normal    Abdominal: Soft  Bowel sounds are normal    Musculoskeletal: Normal range of motion  She exhibits no edema  Neurological: She is alert and oriented to person, place, and time  She has normal reflexes  Skin: Skin is warm and dry  Psychiatric: She has a normal mood and affect   Her behavior is normal  Judgment and thought content normal    Nursing note and vitals reviewed  Discussion/Summary:  Asymptomatic in functional class 1  Twelve lead EKG shows atrial fibrillation with controlled ventricular response  She continues on warfarin  The blood pressure is controlled on current medical regiment  I have made no changes I will see her again in 6 months

## 2019-11-17 NOTE — PROGRESS NOTES
Assessment/Plan:    Problem List Items Addressed This Visit        Cardiovascular and Mediastinum    Essential hypertension     Benign  Acceptable on current regimen of clonidine 0 1 mg daily, diltiazem 240 mg daily, losartan 50 mg daily and metoprolol 25 mg twice daily  Monitor BMP  Relevant Orders    Basic metabolic panel    TSH, 3rd generation with Free T4 reflex       Musculoskeletal and Integument    Lumbosacral spondylosis    Osteoporosis     Involving left forearm  Has underlying vitamin-D deficiency on vitamin-D supplements  Receives Prolia injections q 6 months per Endo Dr Emmanuel [place]  Relevant Orders    Vitamin D 25 hydroxy    Neural foraminal stenosis of lumbar spine       Other    Lower back pain - Primary     Secondary to arthritis  She is s/p epidural injections and rhizotomy  Currently being managed on medical therapy with tramadol for severe pain per pain management and gabapentin  Vitamin D deficiency    Relevant Orders    Vitamin D 25 hydroxy      Other Visit Diagnoses     Medicare annual wellness visit, subsequent        Overweight (BMI 25 0-29  9)        Encounter for immunization        Relevant Orders    influenza vaccine, [id number], high-dose, PF 0 5 mL (FLUZONE HIGH-DOSE) (Completed)          Subjective:      Patient ID: Erin Esquivel is a 80 y o  female  79yo female with chronic LBP, anxiety, afib, HTN, osteoporosis, vitamin D def here for follow up care  Reports she will be spending the christmas holiday with her daughter Makayla Morley in [place]  Reports recently had stomach virus and had lost of appetite  She has lost weight, she is not eating three meals anymore because she does not feel hungry  Hypertension   This is a chronic problem  The current episode started more than 1 year ago  The problem is controlled  Pertinent negatives include no headaches  Risk factors for coronary artery disease include sedentary lifestyle, obesity and post-menopausal state   Past treatments include ACE inhibitors, calcium channel blockers and beta blockers (clonidine)  Compliance problems include exercise  Back Pain   This is a chronic problem  The current episode started more than 1 year ago  The pain is at a severity of 7/10  The symptoms are aggravated by standing (worse when she is getting up or walking)  Associated symptoms include numbness  Pertinent negatives include no headaches  She is followed by Kadeem Foreman for osteoporosis  She has decreased in her height  She last received prolia inj 6/2019      The following portions of the patient's history were reviewed and updated as appropriate: allergies, current medications, past family history, past medical history, past social history, past surgical history and problem list     Current Outpatient Medications:     acetaminophen (TYLENOL) 325 mg tablet, Take 325 mg by mouth every 6 (six) hours as needed for mild pain, Disp: , Rfl:     albuterol (PROAIR HFA) 90 mcg/act inhaler, Inhale 2 puffs every 4 (four) hours as needed for wheezing (Patient not taking: Reported on 11/14/2019), Disp: 1 Inhaler, Rfl: 2    AZOPT 1 % ophthalmic suspension, 2 (two) times a day, Disp: , Rfl: 1    Calcium Citrate 1040 MG TABS, Take by mouth, Disp: , Rfl:     cloNIDine (CATAPRES) 0 1 mg tablet, TAKE 1 TABLET BY MOUTH EVERY DAY, Disp: 30 tablet, Rfl: 5    COMBIGAN 0 2-0 5 %, 2 (two) times a day, Disp: , Rfl: 3    denosumab (PROLIA) 60 mg/mL, Inject 60 mg under the skin once, Disp: , Rfl:     diltiazem (CARDIZEM CD) 240 mg 24 hr capsule, TAKE 1 CAPSULE BY MOUTH EVERY DAY, Disp: 90 capsule, Rfl: 0    ergocalciferol (VITAMIN D2) 50,000 units, Take by mouth once a week , Disp: , Rfl:     esomeprazole (NEXIUM) 40 MG capsule, Take 1 capsule by mouth daily, Disp: , Rfl:     fluticasone (FLONASE) 50 mcg/act nasal spray, 2 sprays into each nostril, Disp: , Rfl:     gabapentin (NEURONTIN) 300 mg capsule, Take 1 tab qHS, Disp: 90 capsule, Rfl: 1   losartan (COZAAR) 50 mg tablet, TAKE 1 TABLET BY MOUTH EVERY DAY, Disp: 30 tablet, Rfl: 5    metoprolol tartrate (LOPRESSOR) 25 mg tablet, Take 1 tablet (25 mg total) by mouth 2 (two) times a day, Disp: 180 tablet, Rfl: 1    Multiple Vitamins-Minerals (MULTI-VITAMIN/MINERALS PO), Take by mouth daily, Disp: , Rfl:     mupirocin (BACTROBAN) 2 % ointment, APPLY SPARINGLY TO AFFECTED AREA 3 TIMES A DAY, Disp: , Rfl: 1    timolol (BETIMOL) 0 5 % ophthalmic solution, 1 drop, Disp: , Rfl:     timolol (TIMOPTIC) 0 25 % ophthalmic solution, timolol 0 25 % eye drops  INSTILL 1 DROP INTO AFFECTED EYE(S) BY OPHTHALMIC ROUTE 2 TIMES PER DAY, Disp: , Rfl:     traMADol (ULTRAM) 50 mg tablet, Take 1 tablet(s) BID by oral route PRN for pain, Disp: , Rfl:     warfarin (COUMADIN) 2 5 mg tablet, TAKE 1-2 TABLETS DAILY AS DIRECTED, Disp: 60 tablet, Rfl: 8    Review of Systems   Constitutional: Positive for unexpected weight change  HENT: Positive for hearing loss  Respiratory: Negative  Cardiovascular: Negative  Gastrointestinal:        Heartburn   Genitourinary: Positive for urgency  Urinary incontinence   Musculoskeletal: Positive for back pain and gait problem  Neurological: Positive for numbness  Negative for dizziness and headaches  Psychiatric/Behavioral: Negative  Objective:    /84 (BP Location: Left arm, Patient Position: Sitting, Cuff Size: Standard)   Pulse 86   Temp 97 7 °F (36 5 °C) (Tympanic)   Ht 5' (1 524 m)   Wt 66 2 kg (146 lb)   SpO2 93%   BMI 28 51 kg/m²      Physical Exam   Constitutional: She is oriented to person, place, and time  She appears well-developed and well-nourished  HENT:   Right Ear: External ear normal    Left Ear: External ear normal    Nose: Nose normal    Mouth/Throat: Oropharynx is clear and moist  No oropharyngeal exudate  Wears bilateral hearing aid   Eyes:   Wears glasses   Neck: Neck supple  Cardiovascular: Intact distal pulses   An irregularly irregular rhythm present  Pulmonary/Chest: Effort normal and breath sounds normal  No stridor  No respiratory distress  Abdominal: Soft  Bowel sounds are normal  She exhibits no distension  There is no tenderness  Musculoskeletal:   Ambulates with walker  Thoracic kyphosis   Neurological: She is alert and oriented to person, place, and time  Skin: Skin is dry  Psychiatric: She has a normal mood and affect  Her behavior is normal    Vitals reviewed

## 2019-11-18 ENCOUNTER — OFFICE VISIT (OUTPATIENT)
Dept: INTERNAL MEDICINE CLINIC | Facility: CLINIC | Age: 84
End: 2019-11-18
Payer: MEDICARE

## 2019-11-18 VITALS
HEART RATE: 86 BPM | OXYGEN SATURATION: 93 % | HEIGHT: 60 IN | BODY MASS INDEX: 28.66 KG/M2 | TEMPERATURE: 97.7 F | WEIGHT: 146 LBS | SYSTOLIC BLOOD PRESSURE: 132 MMHG | DIASTOLIC BLOOD PRESSURE: 84 MMHG

## 2019-11-18 DIAGNOSIS — E55.9 VITAMIN D DEFICIENCY: ICD-10-CM

## 2019-11-18 DIAGNOSIS — M48.061 NEURAL FORAMINAL STENOSIS OF LUMBAR SPINE: ICD-10-CM

## 2019-11-18 DIAGNOSIS — M47.27 OSTEOARTHRITIS OF SPINE WITH RADICULOPATHY, LUMBOSACRAL REGION: ICD-10-CM

## 2019-11-18 DIAGNOSIS — I10 ESSENTIAL HYPERTENSION: ICD-10-CM

## 2019-11-18 DIAGNOSIS — Z00.00 MEDICARE ANNUAL WELLNESS VISIT, SUBSEQUENT: ICD-10-CM

## 2019-11-18 DIAGNOSIS — E66.3 OVERWEIGHT (BMI 25.0-29.9): ICD-10-CM

## 2019-11-18 DIAGNOSIS — M81.0 AGE-RELATED OSTEOPOROSIS WITHOUT CURRENT PATHOLOGICAL FRACTURE: ICD-10-CM

## 2019-11-18 DIAGNOSIS — Z23 ENCOUNTER FOR IMMUNIZATION: ICD-10-CM

## 2019-11-18 DIAGNOSIS — G89.29 CHRONIC BILATERAL LOW BACK PAIN WITH RIGHT-SIDED SCIATICA: Primary | ICD-10-CM

## 2019-11-18 DIAGNOSIS — M54.41 CHRONIC BILATERAL LOW BACK PAIN WITH RIGHT-SIDED SCIATICA: Primary | ICD-10-CM

## 2019-11-18 PROBLEM — Z01.818 PREOPERATIVE CLEARANCE: Status: RESOLVED | Noted: 2019-06-25 | Resolved: 2019-11-18

## 2019-11-18 PROCEDURE — G0008 ADMIN INFLUENZA VIRUS VAC: HCPCS

## 2019-11-18 PROCEDURE — 90662 IIV NO PRSV INCREASED AG IM: CPT

## 2019-11-18 PROCEDURE — G0439 PPPS, SUBSEQ VISIT: HCPCS | Performed by: INTERNAL MEDICINE

## 2019-11-18 PROCEDURE — 99214 OFFICE O/P EST MOD 30 MIN: CPT | Performed by: INTERNAL MEDICINE

## 2019-11-18 NOTE — ASSESSMENT & PLAN NOTE
Benign  Acceptable on current regimen of clonidine 0 1 mg daily, diltiazem 240 mg daily, losartan 50 mg daily and metoprolol 25 mg twice daily  Monitor BMP

## 2019-11-18 NOTE — PATIENT INSTRUCTIONS
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 follow a low-fat diet if you have trouble digesting or absorbing fat  You may also need to follow this diet if you have high cholesterol  You can also lower your cholesterol by increasing the amount of fiber in your diet  Soluble fiber is a type of fiber that helps to decrease cholesterol levels  Different types of fat in food:   · Limit unhealthy fats  A diet that is high in cholesterol, saturated fat, and trans fat may cause unhealthy cholesterol levels  Unhealthy cholesterol levels increase your risk of heart disease  ¨ Cholesterol:  Limit intake of cholesterol to less than 200 mg per day  Cholesterol is found in meat, eggs, and dairy  ¨ Saturated fat:  Limit saturated fat to less than 7% of your total daily calories  Ask your dietitian how many calories you need each day  Saturated fat is found in butter, cheese, ice cream, whole milk, and palm oil   Saturated fat is also found in meat, such as beef, pork, chicken skin, and processed meats  Processed meats include sausage, hot dogs, and bologna  ¨ Trans fat:  Avoid trans fat as much as possible  Trans fat is used in fried and baked foods  Foods that say trans fat free on the label may still have up to 0 5 grams of trans fat per serving  · Include healthy fats  Replace foods that are high in saturated and trans fat with foods high in healthy fats  This may help to decrease high cholesterol levels  ¨ Monounsaturated fats: These are found in avocados, nuts, and vegetable oils, such as olive, canola, and sunflower oil  ¨ Polyunsaturated fats: These can be found in vegetable oils, such as soybean or corn oil  Omega-3 fats can help to decrease the risk of heart disease  Omega-3 fats are found in fish, such as salmon, herring, trout, and tuna  Omega-3 fats can also be found in plant foods, such as walnuts, flaxseed, soybeans, and canola oil    Foods to limit or avoid:   · Grains:      ¨ Snacks that are made with partially hydrogenated oils, such as chips, regular crackers, and butter-flavored popcorn    ¨ High-fat baked goods, such as biscuits, croissants, doughnuts, pies, cookies, and pastries    · Dairy:      ¨ Whole milk, 2% milk, and yogurt and ice cream made with whole milk    ¨ Half and half creamer, heavy cream, and whipping cream    ¨ Cheese, cream cheese, and sour cream    · Meats and proteins:      ¨ High-fat cuts of meat (T-bone steak, regular hamburger, and ribs)    ¨ Fried meat, poultry (turkey and chicken), and fish    ¨ Poultry (chicken and turkey) with skin    ¨ Cold cuts (salami or bologna), hot dogs, miller, and sausage    ¨ Whole eggs and egg yolks    · Vegetables and fruits with added fat:      ¨ Fried vegetables or vegetables in butter or high-fat sauces, such as cream or cheese sauces    ¨ Fried fruit or fruit served with butter or cream    · Fats:      ¨ Butter, stick margarine, and shortening    ¨ Coconut, palm oil, and palm kernel oil  Foods to include:   · Grains:      ¨ Whole-grain breads, cereals, pasta, and brown rice    ¨ Low-fat crackers and pretzels    · Vegetables and fruits:      ¨ Fresh, frozen, or canned vegetables (no salt or low-sodium)    ¨ Fresh, frozen, dried, or canned fruit (canned in light syrup or fruit juice)    ¨ Avocado    · Low-fat dairy products:      ¨ Nonfat (skim) or 1% milk    ¨ Nonfat or low-fat cheese, yogurt, and cottage cheese    · Meats and proteins:      ¨ Chicken or turkey with no skin    ¨ Baked or broiled fish    ¨ Lean beef and pork (loin, round, extra lean hamburger)    ¨ Beans and peas, unsalted nuts, soy products    ¨ Egg whites and substitutes    ¨ Seeds and nuts    · Fats:      ¨ Unsaturated oil, such as canola, olive, peanut, soybean, or sunflower oil    ¨ Soft or liquid margarine and vegetable oil spread    ¨ Low-fat salad dressing  Other ways to decrease fat:   · Read food labels before you buy foods  Choose foods that have less than 30% of calories from fat  Choose low-fat or fat-free dairy products  Remember that fat free does not mean calorie free  These foods still contain calories, and too many calories can lead to weight gain  · Trim fat from meat and avoid fried food  Trim all visible fat from meat before you cook it  Remove the skin from poultry  Do not puri meat, fish, or poultry  Bake, roast, boil, or broil these foods instead  Avoid fried foods  Eat a baked potato instead of [place] [employer] fries  Steam vegetables instead of sautéing them in butter  · Add less fat to foods  Use imitation miller bits on salads and baked potatoes instead of regular miller bits  Use fat-free or low-fat salad dressings instead of regular dressings  Use low-fat or nonfat butter-flavored topping instead of regular butter or margarine on popcorn and other foods  Ways to decrease fat in recipes:  Replace high-fat ingredients with low-fat or nonfat ones   This may cause baked goods to be drier than usual  You may need to use nonfat cooking spray on pans to prevent food from sticking  You also may need to change the amount of other ingredients, such as water, in the recipe  Try the following:  · Use low-fat or light margarine instead of regular margarine or shortening  · Use lean ground turkey breast or chicken, or lean ground beef (less than 5% fat) instead of hamburger  · Add 1 teaspoon of canola oil to 8 ounces of skim milk instead of using cream or half and half  · Use grated zucchini, carrots, or apples in breads instead of coconut  · Use blenderized, low-fat cottage cheese, plain tofu, or low-fat ricotta cheese instead of cream cheese  · Use 1 egg white and 1 teaspoon of canola oil, or use ¼ cup (2 ounces) of fat-free egg substitute instead of a whole egg  · Replace half of the oil that is called for in a recipe with applesauce when you bake  Use 3 tablespoons of cocoa powder and 1 tablespoon of canola oil instead of a square of baking chocolate  How to increase fiber:  Eat enough high-fiber foods to get 20 to 30 grams of fiber every day  Slowly increase your fiber intake to avoid stomach cramps, gas, and other problems  · Eat 3 ounces of whole-grain foods each day  An ounce is about 1 slice of bread  Eat whole-grain breads, such as whole-wheat bread  Whole wheat, whole-wheat flour, or other whole grains should be listed as the first ingredient on the food label  Replace white flour with whole-grain flour or use half of each in recipes  Whole-grain flour is heavier than white flour, so you may have to add more yeast or baking powder  · Eat a high-fiber cereal for breakfast   Oatmeal is a good source of soluble fiber  Look for cereals that have bran or fiber in the name  Choose whole-grain products, such as brown rice, barley, and whole-wheat pasta  · Eat more beans, peas, and lentils  For example, add beans to soups or salads  Eat at least 5 cups of fruits and vegetables each day   Eat fruits and vegetables with the peel because the peel is high in fiber  © 2017 [employer] [employer] Information is for End User's use only and may not be sold, redistributed or otherwise used for commercial purposes  All illustrations and images included in CareNotes® are the copyrighted property of [employer]  or [employer]brah[employer]  The above information is an  only  It is not intended as medical advice for individual conditions or treatments  Talk to your doctor, nurse or pharmacist before following any medical regimen to see if it is safe and effective for you  Heart Healthy Diet   AMBULATORY CARE:   A heart healthy diet  is an eating plan low in total fat, unhealthy fats, and sodium (salt)  A heart healthy diet helps decrease your risk for heart disease and stroke  Limit the amount of fat you eat to 25% to 35% of your total daily calories  Limit sodium to less than 2,300 mg each day  Healthy fats:  Healthy fats can help improve cholesterol levels  The risk for heart disease is decreased when cholesterol levels are normal  Choose healthy fats, such as the following:  · Unsaturated fat  is found in foods such as soybean, canola, olive, corn, and safflower oils  It is also found in soft tub margarine that is made with liquid vegetable oil  · Omega-3 fat  is found in certain fish, such as salmon, tuna, and trout, and in walnuts and flaxseed  Unhealthy fats:  Unhealthy fats can cause unhealthy cholesterol levels in your blood and increase your risk of heart disease  Limit unhealthy fats, such as the following:  · Cholesterol  is found in animal foods, such as eggs and lobster, and in dairy products made from whole milk  Limit cholesterol to less than 300 milligrams (mg) each day  You may need to limit cholesterol to 200 mg each day if you have heart disease  · Saturated fat  is found in meats, such as miller and hamburger   It is also found in chicken or turkey skin, whole milk, and butter  Limit saturated fat to less than 7% of your total daily calories  Limit saturated fat to less than 6% if you have heart disease or are at increased risk for it  · Trans fat  is found in packaged foods, such as potato chips and cookies  It is also in hard margarine, some fried foods, and shortening  Avoid trans fats as much as possible    Heart healthy foods and drinks to include:  Ask your dietitian or healthcare provider how many servings to have from each of the following food groups:  · Grains:      ¨ Whole-wheat breads, cereals, and pastas, and brown rice    ¨ Low-fat, low-sodium crackers and chips    · Vegetables:      ¨ Broccoli, green beans, green peas, and spinach    ¨ Collards, kale, and lima beans    ¨ Carrots, sweet potatoes, tomatoes, and peppers    ¨ Canned vegetables with no salt added    · Fruits:      ¨ Bananas, peaches, pears, and pineapple    ¨ Grapes, raisins, and dates    ¨ Oranges, tangerines, grapefruit, orange juice, and grapefruit juice    ¨ Apricots, mangoes, melons, and papaya    ¨ Raspberries and strawberries    ¨ Canned fruit with no added sugar    · Low-fat dairy products:      ¨ Nonfat (skim) milk, 1% milk, and low-fat almond, cashew, or soy milks fortified with calcium    ¨ Low-fat cheese, regular or frozen yogurt, and cottage cheese    · Meats and proteins , such as lean cuts of beef and pork (loin, leg, round), skinless chicken and turkey, legumes, soy products, egg whites, and nuts  Foods and drinks to limit or avoid:  Ask your dietitian or healthcare provider about these and other foods that are high in unhealthy fat, sodium, and sugar:  · Snack or packaged foods , such as frozen dinners, cookies, macaroni and cheese, and cereals with more than 300 mg of sodium per serving    · Canned or dry mixes  for cakes, soups, sauces, or gravies    · Vegetables with added sodium , such as instant potatoes, vegetables with added sauces, or regular canned vegetables    · Other foods high in sodium , such as ketchup, barbecue sauce, salad dressing, pickles, olives, soy sauce, and miso    · High-fat dairy foods  such as whole or 2% milk, cream cheese, or sour cream, and cheeses     · High-fat protein foods  such as high-fat cuts of beef (T-bone steaks, ribs), chicken or turkey with skin, and organ meats, such as liver    · Cured or smoked meats , such as hot dogs, miller, and sausage    · Unhealthy fats and oils , such as butter, stick margarine, shortening, and cooking oils such as coconut or palm oil    · Food and drinks high in sugar , such as soft drinks (soda), sports drinks, sweetened tea, candy, cake, cookies, pies, and doughnuts  Other diet guidelines to follow:   · Eat more foods containing omega-3 fats  Eat fish high in omega-3 fats at least 2 times a week  · Limit alcohol  Too much alcohol can damage your heart and raise your blood pressure  Women should limit alcohol to 1 drink a day  Men should limit alcohol to 2 drinks a day  A drink of alcohol is 12 ounces of beer, 5 ounces of wine, or 1½ ounces of liquor  · Choose low-sodium foods  High-sodium foods can lead to high blood pressure  Add little or no salt to food you prepare  Use herbs and spices in place of salt  · Eat more fiber  to help lower cholesterol levels  Eat at least 5 servings of fruits and vegetables each day  Eat 3 ounces of whole-grain foods each day  Legumes (beans) are also a good source of fiber  Lifestyle guidelines:   · Do not smoke  Nicotine and other chemicals in cigarettes and cigars can cause lung and heart damage  Ask your healthcare provider for information if you currently smoke and need help to quit  E-cigarettes or smokeless tobacco still contain nicotine  Talk to your healthcare provider before you use these products  · Exercise regularly  to help you maintain a healthy weight and improve your blood pressure and cholesterol levels   Ask your healthcare provider about the best exercise plan for you  Do not start an exercise program without asking your healthcare provider  Follow up with your healthcare provider as directed:  Write down your questions so you remember to ask them during your visits  © 2017 [address] [employer] Information is for End User's use only and may not be sold, redistributed or otherwise used for commercial purposes  All illustrations and images included in CareNotes® are the copyrighted property of [employer]  or [facility]  The above information is an  only  It is not intended as medical advice for individual conditions or treatments  Talk to your doctor, nurse or pharmacist before following any medical regimen to see if it is safe and effective for you  Calorie Counting Diet   WHAT YOU NEED TO KNOW:   What is a calorie counting diet? It is a meal plan based on counting calories each day to reach a healthy body weight  You will need to eat fewer calories if you are trying to lose weight  Weight loss may decrease your risk for certain health problems or improve your health if you have health problems  Some of these health problems include heart disease, high blood pressure, and diabetes  What foods should I avoid? Your dietitian will tell you if you need to avoid certain foods based on your body weight and health condition  You may need to avoid high-fat foods if you are at risk for or have heart disease  You may need to eat fewer foods from the breads and starches food group if you have diabetes  How many calories are in foods? The following is a list of foods and drinks with the approximate number of calories in each  Check the food label to find the exact number of calories  A dietitian can tell you how many calories you should have from each food group each day    · Carbohydrate:      ¨ ½ of a 3-inch bagel, 1 slice of bread, or ½ of a hamburger bun or hot dog bun (80)    ¨ 1 (8-inch) flour tortilla or ½ cup of cooked rice (100)    ¨ 1 (6-inch) corn tortilla (80)    ¨ 1 (6-inch) pancake or 1 cup of bran flakes cereal (110)    ¨ ½ cup of cooked cereal (80)    ¨ ½ cup of cooked pasta (85)    ¨ 1 ounce of pretzels (100)    ¨ 3 cups of air-popped popcorn without butter or oil (80)    · Dairy:      ¨ 1 cup of skim or 1% milk (90)    ¨ 1 cup of 2% milk (120)    ¨ 1 cup of whole milk (160)    ¨ 1 cup of 2% chocolate milk (220)    ¨ 1 ounce of low-fat cheese with 3 grams of fat per ounce (70)    ¨ 1 ounce of cheddar cheese (114)    ¨ ½ cup of 1% fat cottage cheese (80)    ¨ 1 cup of plain or sugar-free, fat-free yogurt (90)    · Protein foods:      ¨ 3 ounces of fish (not breaded or fried) (95)    ¨ 3 ounces of breaded, fried fish (195)    ¨ ¾ cup of tuna canned in water (105)    ¨ 3 ounces of chicken breast without skin (105)    ¨ 1 fried chicken breast with skin (350)    ¨ ¼ cup of fat free egg substitute (40)    ¨ 1 large egg (75)    ¨ 3 ounces of lean beef or pork (165)    ¨ 3 ounces of fried pork chop or ham (185)    ¨ ½ cup of cooked dried beans, such as kidney, mazariegos, lentils, or navy (115)    ¨ 3 ounces of bologna or lunch meat (225)    ¨ 2 links of breakfast sausage (140)    · Vegetables:      ¨ ½ cup of sliced mushrooms (10)    ¨ 1 cup of salad greens, such as lettuce, spinach, or hamzah (15)    ¨ ½ cup of steamed asparagus (20)    ¨ ½ cup of cooked summer squash, zucchini squash, or green or wax beans (25)    ¨ 1 cup of broccoli or cauliflower florets, or 1 medium tomato (25)    ¨ 1 large raw carrot or ½ cup of cooked carrots (40)    ¨ ? of a medium cucumber or 1 stalk of celery (5)    ¨ 1 small baked potato (160)    ¨ 1 cup of breaded, fried vegetables (230)    · Fruit:      ¨ 1 (6-inch) banana (55)     ¨ ½ of a 4-inch grapefruit (55)    ¨ 15 grapes (60)    ¨ 1 medium orange or apple (70)    ¨ 1 large peach (65)    ¨ 1 cup of fresh pineapple chunks (75)    ¨ 1 cup of melon cubes (50)    ¨ 1¼ cups of whole strawberries (45)    ¨ ½ cup of fruit canned in juice (55)    ¨ ½ cup of fruit canned in heavy syrup (110)    ¨ ?  cup of raisins (130)    ¨ ½ cup of unsweetened fruit juice (60)    ¨ ½ cup of grape, cranberry, or prune juice (90)    · Fat:      ¨ 10 peanuts or 2 teaspoons of peanut butter (55)    ¨ 2 tablespoons of avocado or 1 tablespoon of regular salad dressing (45)    ¨ 2 slices of miller (90)    ¨ 1 teaspoon of oil, such as safflower, canola, corn, or olive oil (45)    ¨ 2 teaspoons of low-fat margarine, or 1 tablespoon of low-fat mayonnaise (50)    ¨ 1 teaspoon of regular margarine (40)    ¨ 1 tablespoon of regular mayonnaise (135)    ¨ 1 tablespoon of cream cheese or 2 tablespoons of low-fat cream cheese (45)    ¨ 2 tablespoons of vegetable shortening (215)    · Dessert and sweets:      ¨ 8 animal crackers or 5 vanilla wafers (80)    ¨ 1 frozen fruit juice bar (80)    ¨ ½ cup of ice milk or low-fat frozen yogurt (90)    ¨ ½ cup of sherbet or sorbet (125)    ¨ ½ cup of sugar-free pudding or custard (60)    ¨ ½ cup of ice cream (140)    ¨ ½ cup of pudding or custard (175)    ¨ 1 (2-inch) square chocolate brownie (185)    · Combination foods:      ¨ Bean burrito made with an 8-inch tortilla, without cheese (275)    ¨ Chicken breast sandwich with lettuce and tomato (325)    ¨ 1 cup of chicken noodle soup (60)    ¨ 1 beef taco (175)    ¨ Regular hamburger with lettuce and tomato (310)    ¨ Regular cheeseburger with lettuce and tomato (410)     ¨ ¼ of a 12-inch cheese pizza (280)    ¨ Fried fish sandwich with lettuce and tomato (425)    ¨ Hot dog and bun (275)    ¨ 1½ cups of macaroni and cheese (310)    ¨ Taco salad with a fried tortilla shell (870)    · Low-calorie foods:      ¨ 1 tablespoon of ketchup or 1 tablespoon of fat free sour cream (15)    ¨ 1 teaspoon of mustard (5)    ¨ ¼ cup of salsa (20)    ¨ 1 large dill pickle (15)    ¨ 1 tablespoon of fat free salad dressing (10)    ¨ 2 teaspoons of low-sugar, light jam or jelly, or 1 tablespoon of sugar-free syrup (15)    ¨ 1 sugar-free popsicle (15)    ¨ 1 cup of club soda, seltzer water, or diet soda (0)  CARE AGREEMENT:   You have the right to help plan your care  Discuss treatment options with your caregivers to decide what care you want to receive  You always have the right to refuse treatment  The above information is an  only  It is not intended as medical advice for individual conditions or treatments  Talk to your doctor, nurse or pharmacist before following any medical regimen to see if it is safe and effective for you  © 2017 [address] [facility] Information is for End User's use only and may not be sold, redistributed or otherwise used for commercial purposes  All illustrations and images included in CareNotes® are the copyrighted property of [employer] M [employer]  or [employer]brah[employer] Wright

## 2019-11-18 NOTE — ASSESSMENT & PLAN NOTE
Involving left forearm  Has underlying vitamin-D deficiency on vitamin-D supplements  Receives Prolia injections q 6 months per Endo Dr Boo Gentle

## 2019-11-18 NOTE — PROGRESS NOTES
Assessment and Plan:     Problem List Items Addressed This Visit     None      Visit Diagnoses     Medicare annual wellness visit, subsequent        Overweight (BMI 25 0-29  9)            BMI Counseling: Body mass index is 28 51 kg/m²  The BMI is above normal  Nutrition recommendations include decreasing portion sizes and moderation in carbohydrate intake  Exercise recommendations include exercising 3-5 times per week  No pharmacotherapy was ordered  Falls Plan of Care: balance, strength, and gait training instructions were provided  Recommended assistive device to help with gait and balance  Medications that increase falls were reviewed  Home safety education provided  Ambulates with a walker      Preventive health issues were discussed with patient, and age appropriate screening tests were ordered as noted in patient's After Visit Summary  Personalized health advice and appropriate referrals for health education or preventive services given if needed, as noted in patient's After Visit Summary       History of Present Illness:     Patient presents for Medicare Annual Wellness visit    Patient Care Team:  Rene Branham DO as PCP - MD Kristin Alan MD Walker Coe, MD Wilhelm Picker, MD Rhae Novel, MD (Pain Medicine)  Trini Dhaliwal MD (Ophthalmology)  Ryanne Galvan MD (Internal Medicine)  Jen Ye DPM (Podiatry)     Problem List:     Patient Active Problem List   Diagnosis    Atrial fibrillation [facility]) [I48 91]    Allergic rhinitis    Anxiety    Essential hypertension    Gastroesophageal reflux disease without esophagitis    Glaucoma    Incomplete emptying of bladder    Lipoma    Lower back pain    Lumbosacral spondylosis    Mitral regurgitation    Osteoporosis    Ambulatory dysfunction    Vitamin D deficiency    Chronic anticoagulation    Sensorineural hearing loss of both ears    Neural foraminal stenosis of lumbar spine    Preoperative clearance    Jay of right foot      Past Medical and Surgical History:     Past Medical History:   Diagnosis Date    A-fib ([address] )     Anxiety     Arthritis     Cellulitis     L arm    Community acquired pneumonia     last assessed [id number]    Edema of left lower extremity     last assessed [id number]    Gastroenteritis     last assessed [id number]    Heart murmur     Hypertension     Hypokalemia     Hyponatremia     Lipoma     Lower back pain     Mitral regurgitation     Osteopenia     Periodontal abscess     last assessed [id number]    Tricuspid regurgitation      Past Surgical History:   Procedure Laterality Date    BACK SURGERY      BREAST SURGERY      R breast    CATARACT EXTRACTION Left     left eye    EYE SURGERY      HEMORRHOID SURGERY      HERNIA REPAIR      HYSTERECTOMY        Family History:     Family History   Problem Relation Age of Onset    Colon cancer Mother 61    Heart attack Father         acute MI    Multiple sclerosis Daughter     No Known Problems Maternal Grandmother     No Known Problems Maternal Grandfather     No Known Problems Paternal Grandmother     No Known Problems Paternal Grandfather     Cervical cancer Maternal Aunt     Stomach cancer Maternal Aunt     No Known Problems Daughter     No Known Problems Son     No Known Problems Son       Social History:     Social History     Socioeconomic History    Marital status:       Spouse name: None    Number of children: None    Years of education: None    Highest education level: None   Occupational History    Occupation: retired   Social Needs    Financial resource strain: None    Food insecurity:     Worry: None     Inability: None    Transportation needs:     Medical: None     Non-medical: None   Tobacco Use    Smoking status: Former Smoker     Last attempt to quit: 1940     Years since quittin 9    Smokeless tobacco: Never Used   Substance and Sexual Activity    Alcohol use: No    Drug use: No    Sexual activity: None   Lifestyle    Physical activity:     Days per week: None     Minutes per session: None    Stress: None   Relationships    Social connections:     Talks on phone: None     Gets together: None     Attends [employer] service: None     Active member of club or organization: None     Attends meetings of clubs or organizations: None     Relationship status: None    Intimate partner violence:     Fear of current or ex partner: None     Emotionally abused: None     Physically abused: None     Forced sexual activity: None   Other Topics Concern    None   Social History Narrative    Denied home environment domestic violence       Medications and Allergies:     Current Outpatient Medications   Medication Sig Dispense Refill    acetaminophen (TYLENOL) 325 mg tablet Take 325 mg by mouth every 6 (six) hours as needed for mild pain      albuterol (PROAIR HFA) 90 mcg/act inhaler Inhale 2 puffs every 4 (four) hours as needed for wheezing (Patient not taking: Reported on 11/14/2019) 1 Inhaler 2    AZOPT 1 % ophthalmic suspension 2 (two) times a day  1    Calcium Citrate 1040 MG TABS Take by mouth      cloNIDine (CATAPRES) 0 1 mg tablet TAKE 1 TABLET BY MOUTH EVERY DAY 30 tablet 5    COMBIGAN 0 2-0 5 % 2 (two) times a day  3    denosumab (PROLIA) 60 mg/mL Inject 60 mg under the skin once      diltiazem (CARDIZEM CD) 240 mg 24 hr capsule TAKE 1 CAPSULE BY MOUTH EVERY DAY 90 capsule 0    ergocalciferol (VITAMIN D2) 50,000 units Take by mouth once a week       esomeprazole (NEXIUM) 40 MG capsule Take 1 capsule by mouth daily      fluticasone (FLONASE) 50 mcg/act nasal spray 2 sprays into each nostril      gabapentin (NEURONTIN) 300 mg capsule Take 1 tab qHS 90 capsule 1    losartan (COZAAR) 50 mg tablet TAKE 1 TABLET BY MOUTH EVERY DAY 30 tablet 5    metoprolol tartrate (LOPRESSOR) 25 mg tablet Take 1 tablet (25 mg total) by mouth 2 (two) times a day 180 tablet 1    Multiple Vitamins-Minerals (MULTI-VITAMIN/MINERALS PO) Take by mouth daily      mupirocin (BACTROBAN) 2 % ointment APPLY SPARINGLY TO AFFECTED AREA 3 TIMES A DAY  1    timolol (BETIMOL) 0 5 % ophthalmic solution 1 drop      timolol (TIMOPTIC) 0 25 % ophthalmic solution timolol 0 25 % eye drops   INSTILL 1 DROP INTO AFFECTED EYE(S) BY OPHTHALMIC ROUTE 2 TIMES PER DAY      traMADol (ULTRAM) 50 mg tablet Take 1 tablet(s) BID by oral route PRN for pain      warfarin (COUMADIN) 2 5 mg tablet TAKE 1-2 TABLETS DAILY AS DIRECTED 60 tablet 8     No current facility-administered medications for this visit        Allergies   Allergen Reactions    Erythromycin Rash and Shortness Of Breath    Nitrofurantoin Anaphylaxis    Oxycodone-Acetaminophen Other (See Comments) and Nausea Only     Constipation, nausea, dry heaves    Penicillins Rash and Shortness Of Breath    Salicylates Hives and Shortness Of Breath    Aspirin Rash    Codeine GI Intolerance and Rash    Macrolides And Ketolides     Percolone [Oxycodone] GI Intolerance    Pollen Extract     Doxycycline Rash    Sulfa Antibiotics Rash      Immunizations:     Immunization History   Administered Date(s) Administered    INFLUENZA 12/06/2004, 11/04/2005, 01/19/2010, 10/10/2016, 09/18/2017    Influenza Split High Dose Preservative Free IM 09/03/2013, 09/12/2014, 09/15/2015, 09/15/2015, 10/07/2016, 10/21/2017    Influenza, high dose seasonal 0 5 mL 09/06/2018    Pneumococcal Conjugate 13-Valent 01/28/2015    Pneumococcal Polysaccharide PPV23 01/01/2000    Tdap 04/14/2018      Health Maintenance:         Topic Date Due    DXA SCAN  10/03/2020         Topic Date Due    Influenza Vaccine  07/01/2019      Medicare Health Risk Assessment:     /84 (BP Location: Left arm, Patient Position: Sitting, Cuff Size: Standard)   Pulse 86   Temp 97 7 °F (36 5 °C) (Tympanic)   Ht 5' (1 524 m)   Wt 66 2 kg (146 lb)   SpO2 93%   BMI 28 51 kg/m²      Babita Albert is here for her Subsequent Wellness visit  Last Medicare Wellness visit information reviewed, patient interviewed and updates made to the record today  Health Risk Assessment:   Patient rates overall health as very good  Patient feels that their physical health rating is same  Eyesight was rated as same  Hearing was rated as same  Patient feels that their emotional and mental health rating is same  Pain experienced in the last 7 days has been a lot  Patient's pain rating has been 7/10  Patient states that she has experienced no weight loss or gain in last 6 months  Chronic low back pain    Depression Screening:   PHQ-2 Score: 0      Fall Risk Screening: In the past year, patient has experienced: history of falling in past year    Number of falls: 2 or more    Urinary Incontinence Screening:   Patient has leaked urine accidently in the last six months  Home Safety:  Patient has trouble with stairs inside or outside of their home  Patient has working smoke alarms and has working carbon monoxide detector  Home safety hazards include: none  Nutrition:   Current diet is Regular  Medications:   Patient is currently taking over-the-counter supplements  OTC medications include: see medication list  Patient is able to manage medications  Activities of Daily Living (ADLs)/Instrumental Activities of Daily Living (IADLs):   Walk and transfer into and out of bed and chair?: Yes  Dress and groom yourself?: Yes    Bathe or shower yourself?: Yes    Feed yourself?  Yes  Do your laundry/housekeeping?: Yes  Manage your money, pay your bills and track your expenses?: Yes  Make your own meals?: Yes    Do your own shopping?: Yes    Durable Medical Equipment Suppliers  patient does not know    Previous Hospitalizations:   Any hospitalizations or ED visits within the last 12 months?: No      Advance Care Planning:   Living will: No    Durable POA for healthcare: No    Advanced directive: No    Advanced directive counseling given: Yes      Comments: Wants her daughter Robbin Robins to be her medical POA    Cognitive Screening:   Provider or family/friend/caregiver concerned regarding cognition?: No    PREVENTIVE SCREENINGS      Cardiovascular Screening:    General: Screening Current      Colorectal Cancer Screening:     General: Screening Not Indicated      Breast Cancer Screening:     General: Screening Current      Cervical Cancer Screening:    General: Screening Not Indicated      Osteoporosis Screening:    General: Screening Not Indicated, History Osteoporosis and Screening Current      Abdominal Aortic Aneurysm (AAA) Screening:        General: Screening Not Indicated      Lung Cancer Screening:     General: Screening Not Indicated      Hepatitis C Screening:    General: Screening Not Indicated    Other Counseling Topics:   Alcohol use counseling, car/seat belt/driving safety and calcium and vitamin D intake and regular weightbearing exercise  Immunizations discussed      Anthonette Kanner, DO  BMI Counseling: Body mass index is 28 51 kg/m²  The BMI is above normal  Nutrition recommendations include reducing portion sizes, decreasing overall calorie intake and moderation in carbohydrate intake  Exercise recommendations include exercising 3-5 times per week

## 2019-11-18 NOTE — ASSESSMENT & PLAN NOTE
Secondary to arthritis  She is s/p epidural injections and rhizotomy  Currently being managed on medical therapy with tramadol for severe pain per pain management and gabapentin

## 2019-11-19 ENCOUNTER — OFFICE VISIT (OUTPATIENT)
Dept: AUDIOLOGY | Age: 84
End: 2019-11-19
Payer: MEDICARE

## 2019-11-19 DIAGNOSIS — H90.3 SENSORY HEARING LOSS, BILATERAL: Primary | ICD-10-CM

## 2019-11-19 NOTE — PROGRESS NOTES
Hearing Aid Visit:    Name:  Leonardo Banda  :  1929  Age:  80 y o  Date of Evaluation: 19     Leonardo Banda is being seen for a hearing aid visit  Patient is fit with "[employer] Q-50 312 hearing aid(s)  Right serial number [id number]  Left serial number [id number]  Warranty date: 17 (Loss/Damage and repair)  Patient reports her left hearing aid is not working "dead"  Action:  Visual inspection revealed the hearing aid to be clogged with wax  Counseled patient on cleaning and changing the wax filters  Patient bought 1 pack of filters  Recommendations: Follow up PRN        Tracie Pavon , CCC-A  Clinical Audiologist

## 2019-12-16 ENCOUNTER — APPOINTMENT (OUTPATIENT)
Dept: LAB | Facility: HOSPITAL | Age: 84
End: 2019-12-16
Attending: INTERNAL MEDICINE
Payer: MEDICARE

## 2019-12-16 ENCOUNTER — TRANSCRIBE ORDERS (OUTPATIENT)
Dept: LAB | Facility: HOSPITAL | Age: 84
End: 2019-12-16

## 2019-12-16 DIAGNOSIS — M81.0 SENILE OSTEOPOROSIS: ICD-10-CM

## 2019-12-16 DIAGNOSIS — E55.9 AVITAMINOSIS D: ICD-10-CM

## 2019-12-16 DIAGNOSIS — E55.9 AVITAMINOSIS D: Primary | ICD-10-CM

## 2019-12-16 LAB
25(OH)D3 SERPL-MCNC: 101.9 NG/ML (ref 30–100)
ALBUMIN SERPL BCP-MCNC: 3.3 G/DL (ref 3.5–5)
ALP SERPL-CCNC: 97 U/L (ref 46–116)
ALT SERPL W P-5'-P-CCNC: 10 U/L (ref 12–78)
ANION GAP SERPL CALCULATED.3IONS-SCNC: 5 MMOL/L (ref 4–13)
AST SERPL W P-5'-P-CCNC: 9 U/L (ref 5–45)
BILIRUB SERPL-MCNC: 0.74 MG/DL (ref 0.2–1)
BUN SERPL-MCNC: 15 MG/DL (ref 5–25)
CALCIUM SERPL-MCNC: 8.4 MG/DL (ref 8.3–10.1)
CHLORIDE SERPL-SCNC: 104 MMOL/L (ref 100–108)
CO2 SERPL-SCNC: 27 MMOL/L (ref 21–32)
CREAT SERPL-MCNC: 0.86 MG/DL (ref 0.6–1.3)
GFR SERPL CREATININE-BSD FRML MDRD: 60 ML/MIN/1.73SQ M
GLUCOSE P FAST SERPL-MCNC: 91 MG/DL (ref 65–99)
POTASSIUM SERPL-SCNC: 3.8 MMOL/L (ref 3.5–5.3)
PROT SERPL-MCNC: 7.4 G/DL (ref 6.4–8.2)
SODIUM SERPL-SCNC: 136 MMOL/L (ref 136–145)

## 2019-12-16 PROCEDURE — 82523 COLLAGEN CROSSLINKS: CPT

## 2019-12-16 PROCEDURE — 82306 VITAMIN D 25 HYDROXY: CPT

## 2019-12-16 PROCEDURE — 80053 COMPREHEN METABOLIC PANEL: CPT

## 2019-12-17 ENCOUNTER — ANTICOAG VISIT (OUTPATIENT)
Dept: CARDIOLOGY CLINIC | Facility: CLINIC | Age: 84
End: 2019-12-17

## 2019-12-17 DIAGNOSIS — I48.91 ATRIAL FIBRILLATION, UNSPECIFIED TYPE (HCC): ICD-10-CM

## 2019-12-19 LAB — COLLAGEN CTX SERPL-MCNC: 192 PG/ML

## 2020-01-06 DIAGNOSIS — M47.27 OSTEOARTHRITIS OF SPINE WITH RADICULOPATHY, LUMBOSACRAL REGION: ICD-10-CM

## 2020-01-06 DIAGNOSIS — M48.061 NEURAL FORAMINAL STENOSIS OF LUMBAR SPINE: ICD-10-CM

## 2020-01-06 RX ORDER — GABAPENTIN 300 MG/1
CAPSULE ORAL
Qty: 90 CAPSULE | Refills: 1 | Status: SHIPPED | OUTPATIENT
Start: 2020-01-06

## 2020-01-07 DIAGNOSIS — I10 BENIGN ESSENTIAL HTN: ICD-10-CM

## 2020-01-07 RX ORDER — LOSARTAN POTASSIUM 50 MG/1
TABLET ORAL
Qty: 30 TABLET | Refills: 5 | Status: SHIPPED | OUTPATIENT
Start: 2020-01-07

## 2020-01-22 ENCOUNTER — APPOINTMENT (OUTPATIENT)
Dept: LAB | Facility: HOSPITAL | Age: 85
End: 2020-01-22
Attending: INTERNAL MEDICINE
Payer: MEDICARE

## 2020-01-23 ENCOUNTER — ANTICOAG VISIT (OUTPATIENT)
Dept: CARDIOLOGY CLINIC | Facility: CLINIC | Age: 85
End: 2020-01-23

## 2020-01-23 DIAGNOSIS — I10 BENIGN ESSENTIAL HYPERTENSION: ICD-10-CM

## 2020-01-23 DIAGNOSIS — I48.91 ATRIAL FIBRILLATION, UNSPECIFIED TYPE (HCC): ICD-10-CM

## 2020-01-23 RX ORDER — DILTIAZEM HYDROCHLORIDE 240 MG/1
CAPSULE, COATED, EXTENDED RELEASE ORAL
Qty: 30 CAPSULE | Refills: 0 | OUTPATIENT
Start: 2020-01-23

## 2020-01-24 DIAGNOSIS — I10 BENIGN ESSENTIAL HYPERTENSION: ICD-10-CM

## 2020-01-24 RX ORDER — DILTIAZEM HYDROCHLORIDE 240 MG/1
240 CAPSULE, COATED, EXTENDED RELEASE ORAL DAILY
Qty: 90 CAPSULE | Refills: 1 | Status: SHIPPED | OUTPATIENT
Start: 2020-01-24

## 2020-02-20 ENCOUNTER — APPOINTMENT (OUTPATIENT)
Dept: LAB | Facility: HOSPITAL | Age: 85
End: 2020-02-20
Attending: INTERNAL MEDICINE
Payer: MEDICARE

## 2020-02-21 ENCOUNTER — ANTICOAG VISIT (OUTPATIENT)
Dept: CARDIOLOGY CLINIC | Facility: CLINIC | Age: 85
End: 2020-02-21

## 2020-02-21 DIAGNOSIS — I48.91 ATRIAL FIBRILLATION, UNSPECIFIED TYPE (HCC): ICD-10-CM

## 2020-02-26 ENCOUNTER — TRANSCRIBE ORDERS (OUTPATIENT)
Dept: ADMINISTRATIVE | Facility: HOSPITAL | Age: 85
End: 2020-02-26

## 2020-02-26 DIAGNOSIS — M81.0 OSTEOPOROSIS, UNSPECIFIED OSTEOPOROSIS TYPE, UNSPECIFIED PATHOLOGICAL FRACTURE PRESENCE: Primary | ICD-10-CM

## 2020-03-23 DIAGNOSIS — I10 BENIGN ESSENTIAL HYPERTENSION: ICD-10-CM

## 2020-03-23 RX ORDER — CLONIDINE HYDROCHLORIDE 0.1 MG/1
TABLET ORAL
Qty: 30 TABLET | Refills: 1 | Status: SHIPPED | OUTPATIENT
Start: 2020-03-23

## 2020-04-16 ENCOUNTER — TELEMEDICINE (OUTPATIENT)
Dept: INTERNAL MEDICINE CLINIC | Facility: CLINIC | Age: 85
End: 2020-04-16
Payer: MEDICARE

## 2020-04-16 DIAGNOSIS — I48.11 LONGSTANDING PERSISTENT ATRIAL FIBRILLATION (HCC): ICD-10-CM

## 2020-04-16 DIAGNOSIS — I10 ESSENTIAL HYPERTENSION: Primary | ICD-10-CM

## 2020-04-16 DIAGNOSIS — M25.561 ACUTE PAIN OF RIGHT KNEE: ICD-10-CM

## 2020-04-16 PROCEDURE — 99215 OFFICE O/P EST HI 40 MIN: CPT | Performed by: INTERNAL MEDICINE

## 2020-04-16 RX ORDER — AMLODIPINE BESYLATE 10 MG/1
10 TABLET ORAL DAILY
COMMUNITY

## 2020-05-11 ENCOUNTER — TELEPHONE (OUTPATIENT)
Dept: INTERNAL MEDICINE CLINIC | Facility: CLINIC | Age: 85
End: 2020-05-11

## 2020-06-08 ENCOUNTER — ANTICOAG VISIT (OUTPATIENT)
Dept: CARDIOLOGY CLINIC | Facility: CLINIC | Age: 85
End: 2020-06-08

## 2020-06-08 DIAGNOSIS — I48.11 LONGSTANDING PERSISTENT ATRIAL FIBRILLATION (HCC): ICD-10-CM

## 2021-05-20 NOTE — ASSESSMENT & PLAN NOTE
[facility]: Preventive Care And Screening: Influenza Immunization: Influenza Immunization Administered during Influenza season
Secondary to car door hitting her leg  She is up to date with Td  Advised keeping steri-strips in place until they fall off  Keep open to dry as much as possible  No signs of infection  Will re-eval in one week 
Detail Level: Detailed

## 2023-11-28 NOTE — PROGRESS NOTES
BP Cuff and Home Monitoring  Patient meets criteria for home monitoring of blood pressure post discharge.  Met with patient to assess for availability of home BP monitor.  Patient stated she owns home BP monitor. Patient educated on importance of continuing to monitor BP at home, recording BP on home monitoring log and s/sx of when to call her provider.  Pt verbalized understanding the above information.     REPORT NAME: Patient Visit Summary Report   VISIT DATE: 11/10/2016  VISIT TIME: 1:50 PM EST  PATIENT NAME: Kim LARSEN [address] RECORD NUMBER: [id number]  SOCIAL SECURITY NUMBER:   YOB: 1929  AGE: 80  REFERRING PHYSICIAN: Joanne Cornejo  SUPERVISING CLINICIAN: [address] PROFESSIONAL: [facility]  PATIENT HOME ADDRESS: [address] [place] [address] , [address] [facility] [place], [address] [facility]  PATIENT HOME PHONE: [phone]  DIAGNOSIS 1: Unspecified atrial fibrillation / I48 91  DIAGNOSIS 2:   DIAGNOSIS 3:   DIAGNOSIS 4:   INR RANGE: 2 - 3  INR GOAL: 2 5  TREATMENT START DATE:   TREATMENT END DATE:   NEXT VISIT: 11/21/2016  [facility]    VISIT RESULTS   ENCOUNTER NUMBER:   TEST LOCATION: [facility]  TEST TYPE:   VISIT TYPE:   CURRENT INR: 1 87 PROTIME:   SPECIMEN COL AND RPT DATE: 11/10/2016 1:50 PM  EST    VITAL SIGNS  PULSE:  B/P:  WEIGHT:  HEIGHT:  TEMP:     CURRENT ANTICOAGULANT DOSING SCHEDULE  DOSE SIZE: 2 5mg    ANTICOAGULANT TYPE: WARFARIN  DOSING REGIMEN  Sun       Mon Tues Wed Thurs Fri       Sat  Total/Wk  5         2 5       5         2 5       5         2 5       2 5       25    PATIENT MEDICATION INSTRUCTION: No  PATIENT NUTRITIONAL COUNSELING: No  PATIENT BRUISING INSTRUCTION: No      LAST EDUCATION DATE:       PREVIOUS VISIT INFORMATION  VISITDATE   INR Goal  INR   Sun     Mon Tues Wed Thurs   Fri  Sat     Total/wk  11/10/2016  2 5       1 87  5       2 5     5       2 5     5       2 5  2 5     25  10/13/2016  2 5       2 36  5       2 5     2 5     2 5     5       2 5  2 5     22 5  9/15/2016   2 5       2 79  5       2 5     2 5     2 5     5       2 5  2 5     22 5  8/18/2016   2 5       2 42  5       2 5     2 5     2 5     5       2 5  2 5     22 5    ADDITIONAL PREVIOUS VISIT INFORMATION  VISITDATE   PRIMARY RX               DOSE      CrCl  11/10/2016  WARFARIN                 2 5mg               10/13/2016  WARFARIN 2 5mg               9/15/2016   WARFARIN                 2 5mg               8/18/2016   WARFARIN                 2 5mg                   OTHER CURRENT MEDICATIONS: WARFARIN      PROGRESS NOTES: l/m for pt, pt to call with any missed doses or changes in  meds, or diet      PATIENT INSTRUCTIONS:     TEST LOCATION: [facility]    Electronically signed by: [facility] on 11/10/2016 at 1:50 PM EST